# Patient Record
Sex: FEMALE | Race: WHITE | Employment: OTHER | URBAN - METROPOLITAN AREA
[De-identification: names, ages, dates, MRNs, and addresses within clinical notes are randomized per-mention and may not be internally consistent; named-entity substitution may affect disease eponyms.]

---

## 2017-09-10 ENCOUNTER — APPOINTMENT (OUTPATIENT)
Dept: GENERAL RADIOLOGY | Facility: CLINIC | Age: 82
End: 2017-09-10
Attending: EMERGENCY MEDICINE
Payer: COMMERCIAL

## 2017-09-10 ENCOUNTER — MEDICAL CORRESPONDENCE (OUTPATIENT)
Dept: HEALTH INFORMATION MANAGEMENT | Facility: CLINIC | Age: 82
End: 2017-09-10

## 2017-09-10 ENCOUNTER — HOSPITAL ENCOUNTER (OUTPATIENT)
Facility: CLINIC | Age: 82
Setting detail: OBSERVATION
Discharge: HOME | End: 2017-09-12
Attending: EMERGENCY MEDICINE | Admitting: INTERNAL MEDICINE
Payer: COMMERCIAL

## 2017-09-10 DIAGNOSIS — R79.89 ELEVATED LACTIC ACID LEVEL: ICD-10-CM

## 2017-09-10 DIAGNOSIS — E86.0 DEHYDRATION: ICD-10-CM

## 2017-09-10 DIAGNOSIS — R60.0 LOCALIZED EDEMA: Primary | ICD-10-CM

## 2017-09-10 DIAGNOSIS — R11.2 NON-INTRACTABLE VOMITING WITH NAUSEA, UNSPECIFIED VOMITING TYPE: ICD-10-CM

## 2017-09-10 DIAGNOSIS — R53.1 GENERALIZED WEAKNESS: ICD-10-CM

## 2017-09-10 LAB
ALBUMIN SERPL-MCNC: 3.5 G/DL (ref 3.4–5)
ALBUMIN UR-MCNC: 10 MG/DL
ALP SERPL-CCNC: 65 U/L (ref 40–150)
ALT SERPL W P-5'-P-CCNC: 19 U/L (ref 0–50)
ANION GAP SERPL CALCULATED.3IONS-SCNC: 8 MMOL/L (ref 3–14)
APPEARANCE UR: CLEAR
AST SERPL W P-5'-P-CCNC: 21 U/L (ref 0–45)
BASOPHILS # BLD AUTO: 0 10E9/L (ref 0–0.2)
BASOPHILS NFR BLD AUTO: 0.3 %
BILIRUB SERPL-MCNC: 0.9 MG/DL (ref 0.2–1.3)
BILIRUB UR QL STRIP: NEGATIVE
BUN SERPL-MCNC: 34 MG/DL (ref 7–30)
CALCIUM SERPL-MCNC: 9.1 MG/DL (ref 8.5–10.1)
CHLORIDE SERPL-SCNC: 103 MMOL/L (ref 94–109)
CO2 SERPL-SCNC: 30 MMOL/L (ref 20–32)
COLOR UR AUTO: YELLOW
CREAT SERPL-MCNC: 1.08 MG/DL (ref 0.52–1.04)
DIFFERENTIAL METHOD BLD: ABNORMAL
EOSINOPHIL # BLD AUTO: 0 10E9/L (ref 0–0.7)
EOSINOPHIL NFR BLD AUTO: 0 %
ERYTHROCYTE [DISTWIDTH] IN BLOOD BY AUTOMATED COUNT: 13.3 % (ref 10–15)
GFR SERPL CREATININE-BSD FRML MDRD: 48 ML/MIN/1.7M2
GLUCOSE SERPL-MCNC: 197 MG/DL (ref 70–99)
GLUCOSE UR STRIP-MCNC: NEGATIVE MG/DL
HCT VFR BLD AUTO: 39.2 % (ref 35–47)
HGB BLD-MCNC: 13.3 G/DL (ref 11.7–15.7)
HGB UR QL STRIP: NEGATIVE
HYALINE CASTS #/AREA URNS LPF: 1 /LPF (ref 0–2)
IMM GRANULOCYTES # BLD: 0 10E9/L (ref 0–0.4)
IMM GRANULOCYTES NFR BLD: 0 %
INR PPP: 2.49 (ref 0.86–1.14)
KETONES UR STRIP-MCNC: NEGATIVE MG/DL
LACTATE BLD-SCNC: 1.7 MMOL/L (ref 0.7–2)
LACTATE BLD-SCNC: 3.1 MMOL/L (ref 0.7–2)
LEUKOCYTE ESTERASE UR QL STRIP: NEGATIVE
LIPASE SERPL-CCNC: 230 U/L (ref 73–393)
LYMPHOCYTES # BLD AUTO: 0.1 10E9/L (ref 0.8–5.3)
LYMPHOCYTES NFR BLD AUTO: 3.8 %
MCH RBC QN AUTO: 40.8 PG (ref 26.5–33)
MCHC RBC AUTO-ENTMCNC: 33.9 G/DL (ref 31.5–36.5)
MCV RBC AUTO: 120 FL (ref 78–100)
MONOCYTES # BLD AUTO: 0.2 10E9/L (ref 0–1.3)
MONOCYTES NFR BLD AUTO: 4.9 %
MUCOUS THREADS #/AREA URNS LPF: PRESENT /LPF
NEUTROPHILS # BLD AUTO: 3.4 10E9/L (ref 1.6–8.3)
NEUTROPHILS NFR BLD AUTO: 91 %
NITRATE UR QL: NEGATIVE
NRBC # BLD AUTO: 0 10*3/UL
NRBC BLD AUTO-RTO: 0 /100
PH UR STRIP: 5.5 PH (ref 5–7)
PLATELET # BLD AUTO: 301 10E9/L (ref 150–450)
POTASSIUM SERPL-SCNC: 4.4 MMOL/L (ref 3.4–5.3)
PROT SERPL-MCNC: 7.9 G/DL (ref 6.8–8.8)
RBC # BLD AUTO: 3.26 10E12/L (ref 3.8–5.2)
RBC #/AREA URNS AUTO: 1 /HPF (ref 0–2)
SODIUM SERPL-SCNC: 141 MMOL/L (ref 133–144)
SOURCE: ABNORMAL
SP GR UR STRIP: 1.01 (ref 1–1.03)
UROBILINOGEN UR STRIP-MCNC: NORMAL MG/DL (ref 0–2)
WBC # BLD AUTO: 3.7 10E9/L (ref 4–11)
WBC #/AREA URNS AUTO: 1 /HPF (ref 0–2)

## 2017-09-10 PROCEDURE — 93010 ELECTROCARDIOGRAM REPORT: CPT | Performed by: INTERNAL MEDICINE

## 2017-09-10 PROCEDURE — 83605 ASSAY OF LACTIC ACID: CPT | Mod: 91 | Performed by: EMERGENCY MEDICINE

## 2017-09-10 PROCEDURE — 83605 ASSAY OF LACTIC ACID: CPT | Performed by: INTERNAL MEDICINE

## 2017-09-10 PROCEDURE — 99220 ZZC INITIAL OBSERVATION CARE,LEVL III: CPT | Performed by: INTERNAL MEDICINE

## 2017-09-10 PROCEDURE — 80053 COMPREHEN METABOLIC PANEL: CPT | Performed by: EMERGENCY MEDICINE

## 2017-09-10 PROCEDURE — 25000128 H RX IP 250 OP 636: Performed by: EMERGENCY MEDICINE

## 2017-09-10 PROCEDURE — 71020 XR CHEST 2 VW: CPT

## 2017-09-10 PROCEDURE — 81001 URINALYSIS AUTO W/SCOPE: CPT | Performed by: EMERGENCY MEDICINE

## 2017-09-10 PROCEDURE — G0378 HOSPITAL OBSERVATION PER HR: HCPCS

## 2017-09-10 PROCEDURE — 36415 COLL VENOUS BLD VENIPUNCTURE: CPT | Performed by: INTERNAL MEDICINE

## 2017-09-10 PROCEDURE — 25000132 ZZH RX MED GY IP 250 OP 250 PS 637: Performed by: INTERNAL MEDICINE

## 2017-09-10 PROCEDURE — 93005 ELECTROCARDIOGRAM TRACING: CPT

## 2017-09-10 PROCEDURE — 85025 COMPLETE CBC W/AUTO DIFF WBC: CPT | Performed by: EMERGENCY MEDICINE

## 2017-09-10 PROCEDURE — 25000128 H RX IP 250 OP 636: Performed by: INTERNAL MEDICINE

## 2017-09-10 PROCEDURE — 83690 ASSAY OF LIPASE: CPT | Performed by: EMERGENCY MEDICINE

## 2017-09-10 PROCEDURE — 99285 EMERGENCY DEPT VISIT HI MDM: CPT | Mod: 25

## 2017-09-10 PROCEDURE — 85610 PROTHROMBIN TIME: CPT | Performed by: EMERGENCY MEDICINE

## 2017-09-10 RX ORDER — LIDOCAINE 40 MG/G
CREAM TOPICAL
Status: DISCONTINUED | OUTPATIENT
Start: 2017-09-10 | End: 2017-09-12 | Stop reason: HOSPADM

## 2017-09-10 RX ORDER — NYSTATIN 100000 [USP'U]/G
POWDER TOPICAL 2 TIMES DAILY
COMMUNITY

## 2017-09-10 RX ORDER — NALOXONE HYDROCHLORIDE 0.4 MG/ML
.1-.4 INJECTION, SOLUTION INTRAMUSCULAR; INTRAVENOUS; SUBCUTANEOUS
Status: DISCONTINUED | OUTPATIENT
Start: 2017-09-10 | End: 2017-09-12 | Stop reason: HOSPADM

## 2017-09-10 RX ORDER — WARFARIN SODIUM 2 MG/1
2 TABLET ORAL
Status: COMPLETED | OUTPATIENT
Start: 2017-09-10 | End: 2017-09-10

## 2017-09-10 RX ORDER — PROCHLORPERAZINE MALEATE 5 MG
5 TABLET ORAL EVERY 6 HOURS PRN
Status: DISCONTINUED | OUTPATIENT
Start: 2017-09-10 | End: 2017-09-12 | Stop reason: HOSPADM

## 2017-09-10 RX ORDER — SODIUM CHLORIDE 9 MG/ML
INJECTION, SOLUTION INTRAVENOUS CONTINUOUS
Status: DISCONTINUED | OUTPATIENT
Start: 2017-09-10 | End: 2017-09-11

## 2017-09-10 RX ORDER — ACETAMINOPHEN 325 MG/1
650 TABLET ORAL EVERY 4 HOURS PRN
Status: DISCONTINUED | OUTPATIENT
Start: 2017-09-10 | End: 2017-09-12 | Stop reason: HOSPADM

## 2017-09-10 RX ORDER — PROCHLORPERAZINE 25 MG
12.5 SUPPOSITORY, RECTAL RECTAL EVERY 12 HOURS PRN
Status: DISCONTINUED | OUTPATIENT
Start: 2017-09-10 | End: 2017-09-12 | Stop reason: HOSPADM

## 2017-09-10 RX ORDER — SODIUM CHLORIDE 9 MG/ML
1000 INJECTION, SOLUTION INTRAVENOUS CONTINUOUS
Status: DISCONTINUED | OUTPATIENT
Start: 2017-09-10 | End: 2017-09-10

## 2017-09-10 RX ORDER — SIMVASTATIN 20 MG
20 TABLET ORAL AT BEDTIME
Status: DISCONTINUED | OUTPATIENT
Start: 2017-09-10 | End: 2017-09-12 | Stop reason: HOSPADM

## 2017-09-10 RX ORDER — METOPROLOL SUCCINATE 50 MG/1
50 TABLET, EXTENDED RELEASE ORAL 2 TIMES DAILY
Status: DISCONTINUED | OUTPATIENT
Start: 2017-09-10 | End: 2017-09-12 | Stop reason: HOSPADM

## 2017-09-10 RX ORDER — ONDANSETRON 4 MG/1
4 TABLET, ORALLY DISINTEGRATING ORAL EVERY 6 HOURS PRN
Status: DISCONTINUED | OUTPATIENT
Start: 2017-09-10 | End: 2017-09-12 | Stop reason: HOSPADM

## 2017-09-10 RX ORDER — ACETAMINOPHEN 650 MG/1
650 SUPPOSITORY RECTAL EVERY 4 HOURS PRN
Status: DISCONTINUED | OUTPATIENT
Start: 2017-09-10 | End: 2017-09-12 | Stop reason: HOSPADM

## 2017-09-10 RX ORDER — HYDROXYUREA 500 MG/1
1500 CAPSULE ORAL DAILY
Status: DISCONTINUED | OUTPATIENT
Start: 2017-09-11 | End: 2017-09-12 | Stop reason: HOSPADM

## 2017-09-10 RX ORDER — ONDANSETRON 2 MG/ML
4 INJECTION INTRAMUSCULAR; INTRAVENOUS EVERY 6 HOURS PRN
Status: DISCONTINUED | OUTPATIENT
Start: 2017-09-10 | End: 2017-09-12 | Stop reason: HOSPADM

## 2017-09-10 RX ADMIN — LEVOTHYROXINE SODIUM 37.5 MCG: 50 TABLET ORAL at 17:40

## 2017-09-10 RX ADMIN — SODIUM CHLORIDE 500 ML: 9 INJECTION, SOLUTION INTRAVENOUS at 15:44

## 2017-09-10 RX ADMIN — SODIUM CHLORIDE: 9 INJECTION, SOLUTION INTRAVENOUS at 17:40

## 2017-09-10 RX ADMIN — METOPROLOL SUCCINATE 50 MG: 50 TABLET, EXTENDED RELEASE ORAL at 21:35

## 2017-09-10 RX ADMIN — WARFARIN SODIUM 2 MG: 2 TABLET ORAL at 17:40

## 2017-09-10 RX ADMIN — SIMVASTATIN 20 MG: 20 TABLET, FILM COATED ORAL at 21:35

## 2017-09-10 ASSESSMENT — ENCOUNTER SYMPTOMS
CONFUSION: 1
ABDOMINAL PAIN: 1
FEVER: 1
VOMITING: 1

## 2017-09-10 NOTE — IP AVS SNAPSHOT
"    [place] [address] [facility]: [phone]                                              INTERAGENCY TRANSFER FORM - PHYSICIAN ORDERS   9/10/2017                    Hospital Admission Date: 9/10/2017  SIERRA CALABRESE   : 4/3/1928  Sex: Female        Attending Provider: Renée Greenberg MD     Allergies:  Ciprofloxacin, Vicodin [Hydrocodone-acetaminophen]    Infection:  None   Service:  HOSPITALIST    Ht:  1.626 m (5' 4\")   Wt:  89.2 kg (196 lb 10.4 oz)   Admission Wt:  89.5 kg (197 lb 5 oz)    BMI:  33.76 kg/m 2   BSA:  2.01 m 2            Patient PCP Information     Provider PCP Type    [facility]      ED Clinical Impression     Diagnosis Description Comment Added By Time Added    Generalized weakness [R53.1] Generalized weakness [R53.1]  Uma Henry 9/10/2017  4:01 PM    Elevated lactic acid level [R79.89] Elevated lactic acid level [R79.89]  Melo Burciaga DO 9/10/2017 11:01 PM    Non-intractable vomiting with nausea, unspecified vomiting type [R11.2] Non-intractable vomiting with nausea, unspecified vomiting type [R11.2]  Melo Burciaga DO 9/10/2017 11:01 PM      Hospital Problems as of 2017              Priority Class Noted POA    Dehydration Medium  9/10/2017 Yes      Non-Hospital Problems as of 2017              Priority Class Noted    DVT (deep venous thrombosis) (H) Medium  9/3/2013    Edema Medium  9/3/2013    Thrombocytosis (H) Medium  9/3/2013    Generalized weakness Medium  9/3/2013    Hypothyroidism Medium  9/3/2013    Memory loss Medium  9/3/2013    Constipation Medium  2013      Code Status History     Date Active Date Inactive Code Status Order ID Comments User Context    2017  9:14 AM  DNR/DNI [id number]  Fela Clark MD Outpatient    9/10/2017  7:40 PM 2017  9:14 AM DNR/DNI [id number]  Renée Greenberg MD Inpatient    2013  1:34 PM 9/10/2017  7:40 PM Full Code [id number]  Gladis Leblanc " Outpatient         Medication Review      CONTINUE these medications which may have CHANGED, or have new prescriptions. If we are uncertain of the size of tablets/capsules you have at home, strength may be listed as something that might have changed.        Dose / Directions Comments    COUMADIN 2 MG tablet   This may have changed:    - medication strength  - additional instructions   Generic drug:  warfarin        Dose:  2 mg   Take 1 tablet (2 mg) by mouth daily Hold coumadin tonight and recheck INR tomorrow and restart   Quantity:  30 tablet   Refills:  0        furosemide 20 MG tablet   Commonly known as:  LASIX   This may have changed:    - medication strength  - how much to take   Used for:  Localized edema        Dose:  20 mg   Take 1 tablet (20 mg) by mouth daily   Quantity:  30 tablet   Refills:  0          CONTINUE these medications which have NOT CHANGED        Dose / Directions Comments    CALTRATE 600+D PO        Dose:  1 tablet   Take 1 tablet by mouth daily   Refills:  0        CHOLECALCIFEROL        Dose:  1000 Units   Take 1,000 Units by mouth daily   Refills:  0        cyanocobalamin 500 MCG Tabs        Dose:  500 mcg   Take 500 mcg by mouth daily   Refills:  0        HYDREA 500 MG capsule CHEMO   Generic drug:  hydroxyurea        Dose:  1500 mg   Take 1,500 mg by mouth daily   Refills:  0        nystatin 808918 UNIT/GM Powd   Commonly known as:  MYCOSTATIN        Apply topically 2 times daily as needed   Refills:  0        SIMVASTATIN PO        Dose:  20 mg   Take 20 mg by mouth At Bedtime   Refills:  0        SYNTHROID PO        Dose:  37.5 mcg   Take 37.5 mcg by mouth daily   Refills:  0        TOPROL XL 50 MG 24 hr tablet   Generic drug:  metoprolol        Dose:  50 mg   Take 50 mg by mouth 2 times daily   Refills:  0          STOP taking     POTASSIUM CHLORIDE                   Follow-Up Appointment Instructions     Future Labs/Procedures    Follow-up and recommended labs and tests       Comments:    Follow up with primary care provider, [facility], within 7 days for hospital follow- up.  The following labs/tests are recommended: recheck BMP and INR in 1week .      Follow-Up Appointment Instructions     Follow-up and recommended labs and tests        Follow up with primary care provider, [facility], within 7 days for hospital follow- up.  The following labs/tests are recommended: recheck BMP and INR in 1week .             Statement of Approval     Ordered          09/12/17 0915  I have reviewed and agree with all the recommendations and orders detailed in this document.  EFFECTIVE NOW     Approved and electronically signed by:  Fela Clark MD

## 2017-09-10 NOTE — ED NOTES
"[facility]  ED Nurse Handoff Report    ED Chief complaint: Altered Mental Status (low o2 sats 88-92%, increasing confusion, vomiting, low grade temps)      ED Diagnosis:   Final diagnoses:   None       Code Status: See H&P    Allergies:   Allergies   Allergen Reactions     Ciprofloxacin Hives     Vicodin [Hydrocodone-Acetaminophen] Rash       Activity level - Baseline/Home:  Ambulates with walker but spends most of her time in a recliner per dtr    Activity Level - Current:   Total Care     Needed?: No    Isolation: No  Infection: Not Applicable    Bariatric?: No    Vital Signs:   Vitals:    09/10/17 1415 09/10/17 1430 09/10/17 1547 09/10/17 1548   BP: 111/82  136/73    Pulse:       Resp:       Temp:       TempSrc:       SpO2: 97% 93% 96% 95%       Cardiac Rhythm: ,        Pain level:      Is this patient confused?: Yes, increased from baseline    Patient Report: Initial Complaint: patient sent from [facility] with increased confusion, recent vomiting and low grade temps. Patient's spouse had also been sick last week with \"stomach flu\"  Focused Assessment: AOx1, baseline dementia but more confused than usual per daughter. AVSS. Told by ems patient had RA sats 88-90% but 94% in ED on RA. LS diminished. Back pain, kyphosis. Fragile skin with bruising, 4 attempts to start IV due to veins blowing when flushed. Continue to monitor IV site.   Tests Performed: labs, chest xray, urine  Abnormal Results: lactic acid 3.1  Treatments provided: 500cc bolus    Family Comments: daughter at bedside    OBS brochure/video discussed/provided to patient: N/A , not appropriate for patient    ED Medications:   Medications   0.9% sodium chloride BOLUS (500 mLs Intravenous New Bag 9/10/17 1544)     Followed by   0.9% sodium chloride infusion (not administered)       Drips infusing?:  No      ED NURSE PHONE NUMBER: *[phone]         "

## 2017-09-10 NOTE — PHARMACY-ADMISSION MEDICATION HISTORY
Admission medication history interview status for the 9/10/2017  admission is complete. See EPIC admission navigator for prior to admission medications     Medication history source reliability:Good    Actions taken by pharmacist (provider contacted, etc):None     Additional medication history information not noted on PTA med list :    1. Patient is from nursing home. She has a paper list of medications with her from home, which was reviewed with patient's daughter due to current mental status. Nursing home list included some old medications that she is no longer getting per daughter, stating that patient's family helps administer medications and not nursing staff.   2. Medications added: Simvastatin and Nystatin  3. Medications deleted: Maalox prn, Dulcolax 10mg suppository prn, Metolazone 2.5mg once a week, Miralax prn, Metamucil prn, Senna-S two tablets BID.     Medication reconciliation/reorder completed by provider prior to medication history? No    Time spent in this activity: 20 minutes    Prior to Admission medications    Medication Sig Last Dose Taking? Auth Provider   FUROSEMIDE PO Take 40 mg by mouth daily 9/9/2017 at am Yes Unknown, Entered By History   SIMVASTATIN PO Take 20 mg by mouth At Bedtime 9/9/2017 at pm Yes Unknown, Entered By History   nystatin (MYCOSTATIN) 979663 UNIT/GM POWD Apply topically 2 times daily as needed Past Week at prn Yes Unknown, Entered By History   metoprolol (TOPROL XL) 50 MG 24 hr tablet Take 50 mg by mouth 2 times daily 9/9/2017 at pm Yes Reported, Patient   Calcium Carbonate-Vitamin D (CALTRATE 600+D PO) Take 1 tablet by mouth daily  9/9/2017 at am Yes Reported, Patient   CHOLECALCIFEROL Take 1,000 Units by mouth daily 9/9/2017 at am Yes Reported, Patient   cyanocobalamin 500 MCG TABS Take 500 mcg by mouth daily  9/9/2017 at am Yes Reported, Patient   hydroxyurea (HYDREA) 500 MG capsule Take 1,500 mg by mouth daily 9/9/2017 at am Yes Reported, Patient   POTASSIUM CHLORIDE  Take 20 mEq by mouth daily 9/9/2017 at am Yes Reported, Patient   Warfarin Sodium (COUMADIN PO) Take 2 mg by mouth daily  9/9/2017 at 1700 Yes Reported, Patient   Levothyroxine Sodium (SYNTHROID PO) Take 37.5 mcg by mouth daily  9/9/2017 at am Yes Reported, Patient

## 2017-09-10 NOTE — PHARMACY-ANTICOAGULATION SERVICE
Clinical Pharmacy - Warfarin Dosing Consult     Pharmacy has been consulted to manage this patient s warfarin therapy.  Indication: Atrial Fibrillation  Therapy Goal: INR 2-3  Warfarin Prior to Admission: Yes  Warfarin PTA Regimen: 2MG DAILY    INR   Date Value Ref Range Status   09/10/2017 2.49 (H) 0.86 - 1.14 Final   08/29/2013 2.3  Final       Recommend warfarin 2 mg today.  Pharmacy will monitor Antoinette Cowan daily and order warfarin doses to achieve specified goal.      Please contact pharmacy as soon as possible if the warfarin needs to be held for a procedure or if the warfarin goals change.

## 2017-09-10 NOTE — IP AVS SNAPSHOT
[place] [address] [facility]    [address] [place] [address]    Phone:  [phone]                                       After Visit Summary   9/10/2017    Antoinette Cowan    MRN: [id number]           After Visit Summary Signature Page     I have received my discharge instructions, and my questions have been answered. I have discussed any challenges I see with this plan with the nurse or doctor.    ..........................................................................................................................................  Patient/Patient Representative Signature      ..........................................................................................................................................  Patient Representative Print Name and Relationship to Patient    ..................................................               ................................................  Date                                            Time    ..........................................................................................................................................  Reviewed by Signature/Title    ...................................................              ..............................................  Date                                                            Time

## 2017-09-10 NOTE — H&P
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 Evaluation in the emergency department was only significant for an elevated lactic acid without any other SIRS criteria or localizing findings. Request was made for observation and further administration of IV fluids. Patient accepted to hospitalist service.    1. Dehydration, weakness after suspected viral gastroenteritis. Lactic acid 3.2 in emergency department. Will continue hydrating with IV fluids and repeat lactate in 4 hours. PRN antiemetics have been ordered. PT/OT also consulted given weakness to ensure patient has safe discharge plan after administration of IV fluids overnight. Will hold lasix at this time.    2. 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 Continue warfarin and metoprolol. INR is therapeutic.    3. History of thrombocytosis. Continue hydroxyurea.     4. Hypothyroidism. Continue Synthroid.    5. Venous stasis changes and chronic lower extremity edema. Holding patient's home lasix and potassium supplementation while hydrating with IV fluids.    6. Dementia.    DVT Prophylaxis: Patient already anticoagulated with warfarin.  Code Status: DNR / DNI    Disposition: Expected discharge in < 2 midnights after observation period and treatment with IV fluids.     Renée Frederick MD    Primary Care Physician   [facility]    Chief Complaint   Weakness, confusion, emesis    History is obtained from the patient's daughter.    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     Patient brought to emergency department by daughter from assisted living facility for increased weakness on confusion. Patient is not oriented at baseline with her dementia, but is normally conversational and alert. Daughter reports that patient's  had a GI illness in past week and patient was up on night of 9/9 with emesis and additional emesis this morning. Reported temperature of 100 F on morning of admission. Patient with increased weakness and inability to steer walker, with daughter reporting she was running into walls. Increased fatigue. Daughter also reports that patient was not making eye contact earlier in the day either, though when patient seen after initial evaluation and treatment, she is reportedly improved slightly as she is now slightly more conversant and making eye contact.   Evaluation in the emergency department included CBC, BMP, lipase, lactic acid, urinalysis, and chest x-ray. The only significant finding was an elevated lactic acid of 3.2. No focal findings on history or exam. No abdominal pain. Patient is a poor historian, but denies any localizing pain. Denies any chest pain, shortness of breath, or headaches. Chronic back pain unchanged. Chronic venous stasis changes without any lower extremity pain. Patient has significant kyphosis and previous back surgeries which make transfers uncomfortable, but daughter states this is unchanged.    Patient was treated with IV fluids and request was made for observation and further administration of IV fluids. Patient accepted to hospitalist service.    Past Medical History    I have reviewed this patient's medical history  and updated it with pertinent information if needed.   Past Medical History:   Diagnosis Date     Anemia      Atrial fibrillation, new onset (H) 8/25/13     Breast cancer (H) 1997     Chronic pain      DVT (deep venous thrombosis) (H)      Hyperlipaemia      Hypertension      Hypothyroidism      Mixed hyperlipidemia      Thrombocytosis (H)        Past Surgical History   I have reviewed this patient's surgical history and updated it with pertinent information if needed.  Past Surgical History:   Procedure Laterality Date     JOINT REPLACEMENT  7/23/2009    left total knee replacement     KYPHOPLASTY       Igor Nitinol Filter         Prior to Admission Medications   Prior to Admission Medications   Prescriptions Last Dose Informant Patient Reported? Taking?   CHOLECALCIFEROL 9/9/2017 at am Daughter Yes Yes   Sig: Take 1,000 Units by mouth daily   Calcium Carbonate-Vitamin D (CALTRATE 600+D PO) 9/9/2017 at am Daughter Yes Yes   Sig: Take 1 tablet by mouth daily    FUROSEMIDE PO 9/9/2017 at am Daughter Yes Yes   Sig: Take 40 mg by mouth daily   Levothyroxine Sodium (SYNTHROID PO) 9/9/2017 at am Daughter Yes Yes   Sig: Take 37.5 mcg by mouth daily    POTASSIUM CHLORIDE 9/9/2017 at am Daughter Yes Yes   Sig: Take 20 mEq by mouth daily   SIMVASTATIN PO 9/9/2017 at pm Daughter Yes Yes   Sig: Take 20 mg by mouth At Bedtime   Warfarin Sodium (COUMADIN PO) 9/9/2017 at 1700 Daughter Yes Yes   Sig: Take 2 mg by mouth daily    cyanocobalamin 500 MCG TABS 9/9/2017 at am Daughter Yes Yes   Sig: Take 500 mcg by mouth daily    hydroxyurea (HYDREA) 500 MG capsule 9/9/2017 at am Daughter Yes Yes   Sig: Take 1,500 mg by mouth daily   metoprolol (TOPROL XL) 50 MG 24 hr tablet 9/9/2017 at pm Daughter Yes Yes   Sig: Take 50 mg by mouth 2 times daily   nystatin (MYCOSTATIN) [id number] UNIT/GM POWD Past Week at prn Daughter Yes Yes   Sig: Apply topically 2 times daily as needed      Facility-Administered Medications: None     Allergies    Allergies   Allergen Reactions     Ciprofloxacin Hives     Vicodin [Hydrocodone-Acetaminophen] Rash       Social History   I have reviewed this patient's social history and updated it with pertinent information if needed. Antoinette Cowan      Family History   I have reviewed this patient's family history and updated it with pertinent information if needed.   No family history on file.    Review of Systems   Remainder of the 10 point Review of Systems is negative other than noted in the HPI.    Physical Exam   Temp: 98.3  F (36.8  C) Temp src: Axillary BP: 136/73 Pulse: 98   Resp: 18 SpO2: 94 % O2 Device: None (Room air)    Vital Signs with Ranges  Temp:  [98.3  F (36.8  C)] 98.3  F (36.8  C)  Pulse:  [98] 98  Resp:  [18] 18  BP: (111-136)/(71-82) 136/73  SpO2:  [93 %-99 %] 94 %  0 lbs 0 oz    Constitutional: Obese elderly woman sitting up with severe kyphosis. Alert. Not oriented at baseline. Not oriented at time of exam. Looking about room, making eye contact. Able to answer questions, though poor historian.  HEENT: Normocephalic, atraumatic. PERRL, EOMI. Oral mucosa is dry. No scleral icterus.   Respiratory: Clear to auscultation bilaterally without crackles or wheezes.  Cardiovascular: Irregularly irregular rhythm, regular rate. 2/6 ALE noted.   GI: Soft, nontender on exam, nondistended. Normoactive bowel sounds.  Extremities: Chronic venous changes bilaterally with 2+ pitting edema bilaterally. No gross deformities.   Neurologic: Patient is alert and interactive, though daughter notes this is not at her baseline yet. Did not assess gait as it was reported to be unsteady.    Data   Data reviewed today:  I personally reviewed patient's EKG and chest x-ray. EKG shows patient is currently in atrial fibrillation with controlled rate. No infiltrates on chest x-ray.    Recent Labs  Lab 09/10/17  1450   WBC 3.7*   HGB 13.3   *      INR 2.49*      POTASSIUM 4.4   CHLORIDE 103   CO2 30   BUN 34*   CR  1.08*   ANIONGAP 8   KACEY 9.1   *   ALBUMIN 3.5   PROTTOTAL 7.9   BILITOTAL 0.9   ALKPHOS 65   ALT 19   AST 21   LIPASE 230       Imaging:  Recent Results (from the past 24 hour(s))   Chest XR,  PA & LAT    Narrative    CHEST TWO VIEW   9/10/2017 3:24 PM     HISTORY: Confusion, edema.    COMPARISON: None.    FINDINGS: Kyphosis. Vertebroplasty cement in lower thoracic and upper  lumbar vertebrae. Hiatal hernia. Heart and lungs negative.      Impression    IMPRESSION: No acute abnormality. Hiatal hernia. Cardiomegaly. No  infiltrates.    EVA TAPIA MD

## 2017-09-10 NOTE — IP AVS SNAPSHOT
MRN:[id number]                      After Visit Summary   9/10/2017    Antoinette Cowan    MRN: [id number]           Thank you!     Thank you for choosing [facility] for your care. Our goal is always to provide you with excellent care. Hearing back from our patients is one way we can continue to improve our services. Please take a few minutes to complete the written survey that you may receive in the mail after you visit with us. Thank you!        Patient Information     Date Of Birth          4/3/1928        Designated Caregiver       Most Recent Value    Caregiver    Will someone help with your care after discharge? yes    Name of designated caregiver Nicol Cowan    Phone number of caregiver [phone]    Caregiver address [address] [address]      About your hospital stay     You were admitted on:  September 10, 2017 You last received care in the:  [facility] [address] [facility]    You were discharged on:  September 12, 2017       Who to Call     For medical emergencies, please call 911.  For non-urgent questions about your medical care, please call your primary care provider or clinic, [phone]          Attending Provider     Provider Melo Daniels DO Emergency Medicine    Renée Greenberg MD Internal Medicine       Primary Care Provider Office Phone # Fax #    [facility] [phone]      After Care Instructions     Activity       Your activity upon discharge: activity as tolerated            Diet       Follow this diet upon discharge:regular                  Follow-up Appointments     Follow-up and recommended labs and tests        Follow up with primary care provider, Linda Park, within 7 days for hospital follow- up.  The following labs/tests are recommended: recheck BMP and INR in 1week .                  Additional Services     Home Care PT Referral for Hospital Discharge       PT to celiaal and  treat    Your provider has ordered home care - physical therapy. If you have not been contacted within 2 days of your discharge please call the department phone number listed on the top of this document.            Home care nursing referral       RN skilled nursing visit. RN to teach medication management.    Your provider has ordered home care nursing services. If you have not been contacted within 2 days of your discharge please call the inpatient department phone number at [phone] .                  Warfarin Instruction     You have started taking a medicine called warfarin. This is a blood-thinning medicine (anticoagulant). It helps prevent and treat blood clots.      Before leaving the hospital, make sure you know how much to take and how long to take it.      You will need regular blood tests to make sure your blood is clotting safely. It is very important to see your doctor for regular blood tests.    Talk to your doctor before taking any new medicine (this includes over-the-counter drugs and herbal products). Many medicines can interact with warfarin. This may cause more bleeding or too much clotting.     Eating a lot of vitamin K--found in green, leafy vegetables--can change the way warfarin works in your body. Do NOT avoid these foods. Instead, try to eat the same amount each day.     Bleeding is the most common side-effect of warfarin. You may notice bleeding gums, a bloody nose, bruises and bleeding longer when you cut yourself. See a doctor at once if:   o You cough up blood  o You find blood in your stool (poop)  o You have a deep cut, or a cut that bleeds longer than 10 minutes   o You have a bad cut, hard fall, accident or hit your head (go to urgent care or the emergency room).    For women who can get pregnant: This medicine can harm an unborn baby. Be very careful not to get pregnant while taking this medicine. If you think you might be pregnant, call your doctor right away.    For more  "information, read \"Guide to Warfarin Therapy,  the booklet you received in the hospital.        Pending Results     Date and Time Order Name Status Description    9/10/2017 1645 EKG 12-lead, tracing only Preliminary             Statement of Approval     Ordered          17 0915  I have reviewed and agree with all the recommendations and orders detailed in this document.  EFFECTIVE NOW     Approved and electronically signed by:  Fela Clark MD             Admission Information     Date & Time Provider Department Dept. Phone    9/10/2017 Renée Greenberg MD [facility] [address] [facility] [phone]      Your Vitals Were     Blood Pressure Pulse Temperature Respirations Height Weight    105/56 (BP Location: Left arm) 65 98.2  F (36.8  C) (Oral) 18 1.626 m (5' 4\") 89.2 kg (196 lb 10.4 oz)    Pulse Oximetry BMI (Body Mass Index)                97% 33.76 kg/m2          [facility] Information     [facility] lets you send messages to your doctor, view your test results, renew your prescriptions, schedule appointments and more. To sign up, go to [email or web address] . Click on \"Log in\" on the left side of the screen, which will take you to the Welcome page. Then click on \"Sign up Now\" on the right side of the page.     You will be asked to enter the access code listed below, as well as some personal information. Please follow the directions to create your username and password.     Your access code is: [email or web address]  Expires: 2017  9:17 AM     Your access code will  in 90 days. If you need help or a new code, please call your [facility] clinic or [phone].        Care EveryWhere ID     This is your Care EveryWhere ID. This could be used by other organizations to access your [facility] medical records  [id number]        Equal Access to Services     [facility] ROS AH: Lakhwinder Esqueda, waaxda luqadaha, qaybta kaalmadaylin partida, madhuri sharma. So [facility] " [phone].    ATENCIÓN: Si carmelita giron, tiene a nogueira disposición servicios gratuitos de asistencia lingüística. Dez stone [phone].    We comply with applicable federal civil rights laws and [place] laws. We do not discriminate on the basis of race, color, national origin, age, disability sex, sexual orientation or gender identity.               Review of your medicines      CONTINUE these medicines which may have CHANGED, or have new prescriptions. If we are uncertain of the size of tablets/capsules you have at home, strength may be listed as something that might have changed.        Dose / Directions    COUMADIN 2 MG tablet   This may have changed:    - medication strength  - additional instructions   Generic drug:  warfarin        Dose:  2 mg   Take 1 tablet (2 mg) by mouth daily Hold coumadin tonight and recheck INR tomorrow and restart   Quantity:  30 tablet   Refills:  0       furosemide 20 MG tablet   Commonly known as:  LASIX   This may have changed:    - medication strength  - how much to take   Used for:  Localized edema        Dose:  20 mg   Take 1 tablet (20 mg) by mouth daily   Quantity:  30 tablet   Refills:  0         CONTINUE these medicines which have NOT CHANGED        Dose / Directions    CALTRATE 600+D PO        Dose:  1 tablet   Take 1 tablet by mouth daily   Refills:  0       CHOLECALCIFEROL        Dose:  1000 Units   Take 1,000 Units by mouth daily   Refills:  0       cyanocobalamin 500 MCG Tabs        Dose:  500 mcg   Take 500 mcg by mouth daily   Refills:  0       HYDREA 500 MG capsule CHEMO   Generic drug:  hydroxyurea        Dose:  1500 mg   Take 1,500 mg by mouth daily   Refills:  0       nystatin [id number] UNIT/GM Powd   Commonly known as:  MYCOSTATIN        Apply topically 2 times daily as needed   Refills:  0       SIMVASTATIN PO        Dose:  20 mg   Take 20 mg by mouth At Bedtime   Refills:  0       SYNTHROID PO        Dose:  37.5 mcg   Take 37.5 mcg by mouth daily   Refills:  0        TOPROL XL 50 MG 24 hr tablet   Generic drug:  metoprolol        Dose:  50 mg   Take 50 mg by mouth 2 times daily   Refills:  0         STOP taking     POTASSIUM CHLORIDE                Where to get your medicines      These medications were sent to [facility] [place], [place] - [address]  [address], [place] [address]     Phone:  [phone]     furosemide 20 MG tablet                Protect others around you: Learn how to safely use, store and throw away your medicines at www.disposemymeds.org.             Medication List: This is a list of all your medications and when to take them. Check marks below indicate your daily home schedule. Keep this list as a reference.      Medications           Morning Afternoon Evening Bedtime As Needed    CALTRATE 600+D PO   Take 1 tablet by mouth daily                                CHOLECALCIFEROL   Take 1,000 Units by mouth daily                                COUMADIN 2 MG tablet   Take 1 tablet (2 mg) by mouth daily Hold coumadin tonight and recheck INR tomorrow and restart   Last time this was given:  1 mg on 9/11/2017  6:59 PM   Generic drug:  warfarin                                cyanocobalamin 500 MCG Tabs   Take 500 mcg by mouth daily                                furosemide 20 MG tablet   Commonly known as:  LASIX   Take 1 tablet (20 mg) by mouth daily                                HYDREA 500 MG capsule CHEMO   Take 1,500 mg by mouth daily   Last time this was given:  1,500 mg on 9/12/2017  9:32 AM   Generic drug:  hydroxyurea                                nystatin [id number] UNIT/GM Powd   Commonly known as:  MYCOSTATIN   Apply topically 2 times daily as needed                                SIMVASTATIN PO   Take 20 mg by mouth At Bedtime   Last time this was given:  20 mg on 9/11/2017  9:47 PM                                SYNTHROID PO   Take 37.5 mcg by mouth daily   Last time this was given:  37.5 mcg on 9/12/2017  9:33 AM                                 TOPROL XL 50 MG 24 hr tablet   Take 50 mg by mouth 2 times daily   Last time this was given:  50 mg on 9/12/2017  9:33 AM   Generic drug:  metoprolol

## 2017-09-10 NOTE — ED NOTES
Bed: ED04  Expected date:   Expected time:   Means of arrival:   Comments:  [place] - low O2 sats. Eta 1410

## 2017-09-10 NOTE — ED PROVIDER NOTES
History     Chief Complaint:  Altered Mental Status    HPI   Antoinette Cowan is a 89 year old female, who is currently on Coumadin and has a history of dementia amongst other complicated past medication history as noted below, who presents with her daughter via EMS to the emergency department for evaluation of altered mental status. The history was primarily obtained from the daughter as the patient was confused. The patient currently lives in an assisted living facility with her spouse and was reported to have increased confusion that began late last night or maybe some time this morning. The daughter reports that the patient was baseline yesterday around 1800 when she left after a visit, but was informed by staff at the facility that she had a low-grade fever of 100F at 1100 today and the spouse reported she had began to throw up in the middle of the night. When the daughter went to check on the patient, she noted that patient had increased confusion. She was unable to stand on her own, in addition to had difficulty recognizing people, which is abnormal for her. The daughter also noted that she was not very interactive and responsive with other people which is different than baseline for him. The daughter also noted that the patient was unable to steer her walker and would run into the wall. The patient reports to experiencing mild abdominal pain, but denies any fall. To note, the patient's spouse fell ill a few days ago with flulike symptoms and the patient experienced some diarrhea 3 days ago, but that has resolved. Furthermore, the patient has not had any of her medications today.     Allergies:  Ciprofloxacin  Vicodin     Medications:    Zaroxolyn  Maalox  Ducloax  Miralax  Senna  Lasix  Metoprolol  Caltrate  Cholecaliferol  Cyanocabalamin  Hydrea  Potassium chloride  Metamucil  Coumadin  Synthroid    Past Medical History:    Anemia  Atrial fibrillation  Breast cancer  Chronic  "pain  DVT  Dementia  Hyperlipidemia  Hypertension  Hypothyroidism  Thrombocytosis    Past Surgical History:    Left total knee replacement  Kyphoplasty  Igor nitinol filter    Family History:    The patient denies any relevant family medical history.     Social History:  The patient was accompanied to the ED by EMS and daughter.  Smoking Status: N/A  Smokeless Tobacco: N/A  Alcohol Use: N/A   Marital Status:   [2]     Review of Systems   Constitutional: Positive for fever.   Gastrointestinal: Positive for abdominal pain and vomiting.   Psychiatric/Behavioral: Positive for confusion.   All other systems reviewed and are negative.    Physical Exam   Vitals:  Patient Vitals for the past 24 hrs:   BP Temp Temp src Pulse Heart Rate Resp SpO2 Height Weight   09/10/17 2135 130/62 - - 98 - - - - -   09/10/17 1656 132/55 98.3  F (36.8  C) Oral - 81 18 96 % 1.626 m (5' 4\") 89.5 kg (197 lb 5 oz)   09/10/17 1608 - - - - - - 94 % 1.676 m (5' 6\") -   09/10/17 1548 - - - - - - 95 % - -   09/10/17 1547 136/73 - - - - - 96 % - -   09/10/17 1430 - - - - - - 93 % - -   09/10/17 1415 111/82 - - - - - 97 % - -   09/10/17 1414 127/71 98.3  F (36.8  C) Axillary 98 - 18 99 % - -      Physical Exam  General: Alert and cooperative with exam. Patient in mild distress. Confused changed from baseline mentation.   Head:  Scalp is NC/AT  Eyes:  No scleral icterus, PERRL, EOMI   ENT:  The external nose and ears are normal. The oropharynx is normal and without erythema; mucus membranes are somewhat dry. Uvula midline, no evidence of deep space infection.  Neck:  Normal range of motion without rigidity.  CV:  Regular rate and rhythm    No pathologic murmur   Resp:  Breath sounds are clear bilaterally    Non-labored, no retractions or accessory muscle use  GI:  Abdomen is soft, no distension, no tenderness. No peritoneal signs  MS:  No lower extremity edema   Skin:  Warm and dry, No rash or lesions noted.  Neuro: Alert. History of dementia, " increased confusion per family No gross motor deficits.    Strength and sensation grossly intact in all 4 extremities.      Cranial nerves 2-12 intact.    GCS: 15  Emergency Department Course     Imaging:  Radiology findings were communicated with the patient and family who voiced understanding of the findings.  Chest XR, PA & LAT:   IMPRESSION: No acute abnormality. Hiatal hernia. Cardiomegaly. No  infiltrates.  Reading per radiology.     Laboratory:  Laboratory findings were communicated with the patient and family who voiced understanding of the findings.  CBC: WBC 3.7 (L) o/w WNL (HGB 13.3, )  CMP: Glucose 197 (H), Bun 34 (H), GFR Estimate 48 (L), Creatinine 1.08 (H) o/w WNL  Lactic Acid Whole Blood: 3.1 (H)  INR: 2.49 (H)  Lipase: 230  UA Reflex to Microscopic: Protein Albumin 10 (A), Mucous Urine Present (A) o/w WNL    Interventions:  1544 0.9% NaCl Bolus 500 mL IV     Emergency Department Course:  Nursing notes and vitals reviewed.  I performed an exam of the patient as documented above.   IV was inserted and blood was drawn for laboratory testing, results above.  The patient was sent for a Chest XR, PA & LAT while in the emergency department, results above.   The patient provided a urine sample here in the emergency department. This was sent for laboratory testing, findings above.      3:37 PM: I rechecked the patient and discussed laboratory results with her and daughter.     I discussed the treatment plan with the patient. They expressed understanding of this plan and consented to admission. I discussed the patient with Dr. Greenberg, who will admit the patient to a monitored bed for further evaluation and treatment.     I personally reviewed the laboratory results with the Patient and daughter and answered all related questions prior to admission.    Impression & Plan      Medical Decision Making:  This patient is an 89 year old female with a history of dementia who presents from assisted care with  report of borderline fever, generalized weakness, emesis and increased confusion from baseline as well as report of possible hypoxia. Patient's medical history and records were reviewed. Initial consider for, but not limited to, infectious process, dehydration, medication side effect, among others. Labs and imaging were obtained. Patient's had no focal neurologic deficits on exam and no history of trauma; no indication for advance head imaging at this time. Labs obtained and demonstrated elevated lactic acid (3.1) and therapeutic INR (2.49). Patient is afebrile and not requiring oxygen on arrival. Remained hemodynamically stable throughout ED encounter. UA without evidenced of infection; chest x-ray is negative. Patient does have  who has also been sick with GI symptoms and at this time presentation is most consistent with viral illness. No indication for antibiotics at this time. She was provided IV fluids and admitted to [facility] with the hospitalist service for further observation and care.     Diagnosis:    ICD-10-CM    1. Generalized weakness R53.1 Lactic acid whole blood   2. Elevated lactic acid level R79.89    3. Non-intractable vomiting with nausea, unspecified vomiting type R11.2         Disposition:   Admitted for observation with the hospitalist service    Scribe Disclosure:  IRita, am serving as a scribe at 2:19 PM on 9/10/2017 to document services personally performed by Melo Burciaga DO, based on my observations and the provider's statements to me.  9/10/2017    EMERGENCY DEPARTMENT       Melo Burciaga DO  09/10/17 1735

## 2017-09-11 ENCOUNTER — APPOINTMENT (OUTPATIENT)
Dept: PHYSICAL THERAPY | Facility: CLINIC | Age: 82
End: 2017-09-11
Payer: COMMERCIAL

## 2017-09-11 LAB
ANION GAP SERPL CALCULATED.3IONS-SCNC: 6 MMOL/L (ref 3–14)
BASOPHILS # BLD AUTO: 0 10E9/L (ref 0–0.2)
BASOPHILS NFR BLD AUTO: 0.2 %
BUN SERPL-MCNC: 32 MG/DL (ref 7–30)
CALCIUM SERPL-MCNC: 8.8 MG/DL (ref 8.5–10.1)
CHLORIDE SERPL-SCNC: 109 MMOL/L (ref 94–109)
CO2 SERPL-SCNC: 27 MMOL/L (ref 20–32)
CREAT SERPL-MCNC: 1.06 MG/DL (ref 0.52–1.04)
DIFFERENTIAL METHOD BLD: ABNORMAL
EOSINOPHIL # BLD AUTO: 0 10E9/L (ref 0–0.7)
EOSINOPHIL NFR BLD AUTO: 0.4 %
ERYTHROCYTE [DISTWIDTH] IN BLOOD BY AUTOMATED COUNT: 13.9 % (ref 10–15)
FLUAV+FLUBV RNA SPEC QL NAA+PROBE: NEGATIVE
FLUAV+FLUBV RNA SPEC QL NAA+PROBE: NEGATIVE
GFR SERPL CREATININE-BSD FRML MDRD: 49 ML/MIN/1.7M2
GLUCOSE SERPL-MCNC: 108 MG/DL (ref 70–99)
HCT VFR BLD AUTO: 35.3 % (ref 35–47)
HGB BLD-MCNC: 11.6 G/DL (ref 11.7–15.7)
IMM GRANULOCYTES # BLD: 0 10E9/L (ref 0–0.4)
IMM GRANULOCYTES NFR BLD: 0.2 %
INR PPP: 3.01 (ref 0.86–1.14)
LACTATE BLD-SCNC: 1.1 MMOL/L (ref 0.7–2)
LYMPHOCYTES # BLD AUTO: 0.5 10E9/L (ref 0.8–5.3)
LYMPHOCYTES NFR BLD AUTO: 9.6 %
MCH RBC QN AUTO: 39.9 PG (ref 26.5–33)
MCHC RBC AUTO-ENTMCNC: 32.9 G/DL (ref 31.5–36.5)
MCV RBC AUTO: 121 FL (ref 78–100)
MONOCYTES # BLD AUTO: 0.4 10E9/L (ref 0–1.3)
MONOCYTES NFR BLD AUTO: 7.7 %
NEUTROPHILS # BLD AUTO: 4.6 10E9/L (ref 1.6–8.3)
NEUTROPHILS NFR BLD AUTO: 81.9 %
NRBC # BLD AUTO: 0 10*3/UL
NRBC BLD AUTO-RTO: 0 /100
PLATELET # BLD AUTO: 291 10E9/L (ref 150–450)
POTASSIUM SERPL-SCNC: 3.7 MMOL/L (ref 3.4–5.3)
RBC # BLD AUTO: 2.91 10E12/L (ref 3.8–5.2)
RSV RNA SPEC NAA+PROBE: NEGATIVE
SODIUM SERPL-SCNC: 142 MMOL/L (ref 133–144)
SPECIMEN SOURCE: NORMAL
WBC # BLD AUTO: 5.6 10E9/L (ref 4–11)

## 2017-09-11 PROCEDURE — 25000132 ZZH RX MED GY IP 250 OP 250 PS 637: Performed by: INTERNAL MEDICINE

## 2017-09-11 PROCEDURE — 85025 COMPLETE CBC W/AUTO DIFF WBC: CPT | Performed by: INTERNAL MEDICINE

## 2017-09-11 PROCEDURE — G0378 HOSPITAL OBSERVATION PER HR: HCPCS

## 2017-09-11 PROCEDURE — 85610 PROTHROMBIN TIME: CPT | Performed by: INTERNAL MEDICINE

## 2017-09-11 PROCEDURE — 80048 BASIC METABOLIC PNL TOTAL CA: CPT | Performed by: INTERNAL MEDICINE

## 2017-09-11 PROCEDURE — 99207 ZZC CDG-MDM COMPONENT: MEETS MODERATE - UP CODED: CPT | Performed by: INTERNAL MEDICINE

## 2017-09-11 PROCEDURE — 99226 ZZC SUBSEQUENT OBSERVATION CARE,LEVEL III: CPT | Performed by: INTERNAL MEDICINE

## 2017-09-11 PROCEDURE — S0176 HYDROXYUREA 500 MG: HCPCS | Performed by: INTERNAL MEDICINE

## 2017-09-11 PROCEDURE — 97161 PT EVAL LOW COMPLEX 20 MIN: CPT | Mod: GP

## 2017-09-11 PROCEDURE — 40000193 ZZH STATISTIC PT WARD VISIT

## 2017-09-11 PROCEDURE — 99207 ZZC CDG-CODE CATEGORY CHANGED: CPT | Performed by: INTERNAL MEDICINE

## 2017-09-11 PROCEDURE — 83605 ASSAY OF LACTIC ACID: CPT | Performed by: INTERNAL MEDICINE

## 2017-09-11 PROCEDURE — G8980 MOBILITY D/C STATUS: HCPCS | Mod: GP,CI

## 2017-09-11 PROCEDURE — 87631 RESP VIRUS 3-5 TARGETS: CPT | Performed by: INTERNAL MEDICINE

## 2017-09-11 PROCEDURE — 25000128 H RX IP 250 OP 636: Performed by: INTERNAL MEDICINE

## 2017-09-11 PROCEDURE — 36415 COLL VENOUS BLD VENIPUNCTURE: CPT | Performed by: INTERNAL MEDICINE

## 2017-09-11 PROCEDURE — G8978 MOBILITY CURRENT STATUS: HCPCS | Mod: GP,CI

## 2017-09-11 RX ORDER — WARFARIN SODIUM 1 MG/1
1 TABLET ORAL
Status: COMPLETED | OUTPATIENT
Start: 2017-09-11 | End: 2017-09-11

## 2017-09-11 RX ADMIN — SODIUM CHLORIDE: 9 INJECTION, SOLUTION INTRAVENOUS at 03:21

## 2017-09-11 RX ADMIN — WARFARIN SODIUM 1 MG: 1 TABLET ORAL at 18:59

## 2017-09-11 RX ADMIN — HYDROXYUREA 1500 MG: 500 CAPSULE ORAL at 09:05

## 2017-09-11 RX ADMIN — SODIUM CHLORIDE: 9 INJECTION, SOLUTION INTRAVENOUS at 07:45

## 2017-09-11 RX ADMIN — SIMVASTATIN 20 MG: 20 TABLET, FILM COATED ORAL at 21:47

## 2017-09-11 RX ADMIN — LEVOTHYROXINE SODIUM 37.5 MCG: 50 TABLET ORAL at 08:51

## 2017-09-11 RX ADMIN — METOPROLOL SUCCINATE 50 MG: 50 TABLET, EXTENDED RELEASE ORAL at 21:47

## 2017-09-11 RX ADMIN — METOPROLOL SUCCINATE 50 MG: 50 TABLET, EXTENDED RELEASE ORAL at 08:52

## 2017-09-11 RX ADMIN — SODIUM CHLORIDE: 9 INJECTION, SOLUTION INTRAVENOUS at 13:13

## 2017-09-11 NOTE — PLAN OF CARE
Problem: Goal Outcome Summary  Goal: Goal Outcome Summary  PT: Orders received, evaluation completed. Patient currently admitted under observation status. 89 year old female admitted with weakness and dehydration. PMH significant for dinah jordan fib. Pt not able to provide any PLOF information, but is conversational. Per chart, patient resides in detention and uses a walker for mobility. Unclear how much support detention can/will provide.      Discharge Planner PT   Patient plan for discharge: Not stated.   Current status: Supine to sit with min A x 1 person and increased time. Sit to/from stand with min A x 1 and CGA of another. Pt ambulates 10' with FWW and CGA x 1. No LE buckling noted. Sit to supine with min A x 1. Min A x 2 needed to reposition hips in bed.   Barriers to return to prior living situation: Unclear level of support available. Pt would need assist of 1-2 for safe mobility.   Recommendations for discharge: Patient would be safe to return to detention if staff can provide assist x 1 for all mobility/ADL's, use of FWW and with HHPT. If staff cannot provide assist x 1, patient would need TCU.   Rationale for recommendations: Currently needing assist x 1 for safe mobility.        Entered by: Ayana Diggs 09/11/2017 2:39 PM      No further skilled acute care PT needs identified under observation status. Appropriate to defer treatment to next level of care.

## 2017-09-11 NOTE — PLAN OF CARE
"Problem: Goal Outcome Summary  Goal: Goal Outcome Summary  PT: Orders received, chart reviewed. Patient currently admitted under observation status. Per chart, patient reside in assisted living and can ambulate with a a walker. Unclear how much support the jail can provide. Attempted evaluation. Pt alert, but declines at this time: \"I just can't right now, I'm too weak\". Will continue to follow and perform OOB evaluation as pt agreeable.       "

## 2017-09-11 NOTE — PLAN OF CARE
Problem: Goal Outcome Summary  Goal: Goal Outcome Summary  Outcome: Improving  Care Plan Summary Note: vss, alert to self, place and family member only. Denied pain. +BS, lungs clear, on RA, denied SOB. Tolerating oral intake, good appetite. Per daughter, whom was at bedside, she was doing much better and less confused today. PIV patent and infusing. Refused turning Q 2 hours at the beginning of the shift, but agreeable at 11am. Severe kyphosis. Refused PT. Pt was living at a assisted facility, was using walker prior to admission. Slight redness in groin, powder applied. INC of B&B. Bruises on buttock, pt unable to provide detail on that. Fall precaution in place. Pleasant but forgetful. Will continue to monitor.       Update: PT reassessed pt, needing ax1 getting back to bed. Will update MD pending d/c to assist living.

## 2017-09-11 NOTE — PLAN OF CARE
Problem: Goal Outcome Summary  Goal: Goal Outcome Summary  Outcome: No Change  Lethargic. Disoriented to time and situation. Pt has baseline dementia however she is normally more conversational and alert per pt daughter. Does not consistently respond to staff. Denies pain however yells out when being repositioned. Incontinent; does not notify staff when wet. IV infusing. Significant kyphosis. Up with lift. Daughter reports that pt uses 4 wheel walker at home. Poor appetite; pt has not eaten since yesterday per daughter report. Reports nausea but declines any intervention with medication. No emesis. Lactic acid trending down. Ecchymotic bottom. BLE zaida and edematous. Discharge TBD.

## 2017-09-11 NOTE — PROGRESS NOTES
09/11/17 1400   Quick Adds   Type of Visit Initial PT Evaluation   Living Environment   Living Environment Comment Patient currently not able to provide any PLOF information. Per H&P patient is alert and conversational at baseline, but has dementia. Per chart, patient resides in an [facility] and uses a walker for ambulation.    Self-Care   Usual Activity Tolerance (Unknown)   Current Activity Tolerance fair   Equipment Currently Used at Home walker, rolling   Functional Level Prior   Ambulation 1-->assistive equipment   Transferring 1-->assistive equipment   Cognition (Baseline dementia)   General Information   Onset of Illness/Injury or Date of Surgery - Date 09/10/17   Referring Physician MD Yari   Patient/Family Goals Statement Not stated.    Pertinent History of Current Problem (include personal factors and/or comorbidities that impact the POC) 89 year old female admitted with weakness and dehydration. PMH significant for dinah jordan.    Precautions/Limitations fall precautions   Cognitive Status Examination   Orientation person  (Disoriented to situation, time and reason for admission)   Level of Consciousness alert   Follows Commands and Answers Questions able to follow single-step instructions   Pain Assessment   Patient Currently in Pain No   Posture    Posture Forward head position;Protracted shoulders;Kyphosis   Range of Motion (ROM)   ROM Comment Decreased LE AROM bilaterally. Appears to be due to weakness. Pt uses UE's to reposition LE's in bed.    Strength   Strength Comments Decreased generally, needs assist for physical mobility at this time.    Bed Mobility   Bed Mobility Comments Supine to sit with min A x 1 and increased time. Sit to supine with min A x 1 person for LE's. Needs min A x 2 persons and cues for bridge technique to reposition in bed.    Transfer Skills   Transfer Comments Sit to/from stand with min A x 1 person.    Gait   Gait Comments Ambulates 10' with FWW and CGA.    Balance  "  Balance Comments Requires bilateral UE support on walker for safe mobility at this time.    Coordination   Coordination no deficits were identified   Clinical Impression   Criteria for Skilled Therapeutic Intervention evaluation only  (OBS status)   PT Diagnosis Impaired gait, generalized weakness   Influenced by the following impairments Weakness   Functional limitations due to impairments Decreased independence with functional mobility   Clinical Presentation Stable/Uncomplicated   Clinical Presentation Rationale Medically stable.    Clinical Decision Making (Complexity) Low complexity   Anticipated Discharge Disposition Transitional Care Facility  (V. Return to [facility] with Ax1 for mobility and HHPT)   Risk & Benefits of therapy have been explained Yes   Patient, Family & other staff in agreement with plan of care Yes   1x Eval Only-Outpatient/Observation Medicare G-Code   G-code Mobility: Walking & Moving Around   Mobility: Walking & Moving Around   Mobility: Current Status , Goal , Discharge -Xmrr Only- Modifier the same for all G-codes CI: 1-19% impairment   [facility] OpenQ-PAC TM \"6 Clicks\"   2016, Trustees of [facility], under license to [employer].  All rights reserved.   6 Clicks Short Forms Basic Mobility Inpatient Short Form   [facility] AM-PAC  \"6 Clicks\" V.2 Basic Mobility Inpatient Short Form   1. Turning from your back to your side while in a flat bed without using bedrails? 3 - A Little   2. Moving from lying on your back to sitting on the side of a flat bed without using bedrails? 3 - A Little   3. Moving to and from a bed to a chair (including a wheelchair)? 3 - A Little   4. Standing up from a chair using your arms (e.g., wheelchair, or bedside chair)? 3 - A Little   5. To walk in hospital room? 3 - A Little   6. Climbing 3-5 steps with a railing? 1 - Total   Basic Mobility Raw Score (Score out of 24.Lower scores equate to lower levels of function) 16   Total " Evaluation Time   Total Evaluation Time (Minutes) 13

## 2017-09-11 NOTE — PROGRESS NOTES
Care Transition Initial Assessment - AMADO  Reason For Consult: discharge planning  Met with: Patient and Family    Active Problems:    Dehydration         DATA  Lives With: spouse, facility resident  Living Arrangements: assisted living  Description of Support System: Supportive, Involved  Who is your support system?: , Children, Facility resident(s)/Staff  Support Assessment: Adequate family and caregiver support.   Identified issues/concerns regarding health management:     SW received request for consult to assist with discharge planning. Patient admitted Observation Status on 9/10/17 for Dehydration. Tentative discharge date 9/12/17. AMADO met with patient. Patient currently resides at [facility] in the [facility]. SW discussed with patient returning to her long term vs discharging to TCU. Patient requesting SW contact her family to further discuss discharge planning. SW attempted to reach patient's daughter Nicol, however there was no answer and a generic voicemail. SW contacted patient's  [place], and discussed above recommendations of TCU vs long term with increased services. Informed patient's  of TCU coverage through [facility], TCU facilities, and costs of private room (as requested by him). Patient's spouse stating he would prefer for the patient to return home, as he feels [place] could manage her needs. Patient's  would also be agreeable to home care services. AMADO spoke with Freida (ERENDIRA) at [facility] (P: [phone], F: [phone]). AMADO updated Freida on information from PT evaluation, and recommendations. Per Freida, patient currently receives assistance with bathing/showering, grooming/dressing, toileting, and can have transfer assistance. Freida stated that patient appears to mostly be at her baseline, and they would be able to assist with transfers. Freida was also in agreement with patient having home care services at discharge. Per Freida, they would be able to accept  patient back in the AM, and would like discharge orders faxed. Freida also stated that influenza has been going around the nursing home, and patient's  was recently ill. SW updated MD on above information.         Quality Of Family Relationships: supportive, involved, helpful  Transportation Available: family or friend will provide      ASSESSMENT  Cognitive Status:  Patient was very tired during conversation.   Concerns to be addressed: discharge planning.       PLAN  Financial costs for the patient includes: N/A.  Patient given options and choices for discharge: discussed recommendations of TCU vs return to nursing home with home care.  Patient/family is agreeable to the plan? Patient's  prefers return to nursing home.   Patient Goals and Preferences: return to nursing home.  Patient anticipates discharging to: Back to [facility] with home care.    Pebbles Vazquez, DORIE, LGSW

## 2017-09-11 NOTE — PROGRESS NOTES
Care Coordinator met with pt this AM, left message for her Daughter Nicol and then discussed Observation with pt's Spouse [place] since did not receive call back from pt's daughter.  Pt resides at [facility] and per pt's spouse, she receives bathing help 2x per week.  She is able to dress herself and her spouse manages her medications.  Pt ambulates with a walker at baseline.  She is tolerating diet and was refusing to get out of bed per Nsg.  SW consult pending, as PT is recommending TCU.  Pt has [employer], so would most likely have TCU coverage.

## 2017-09-11 NOTE — PROGRESS NOTES
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     1. Dehydration, weakness after suspected viral gastroenteritis.   -Patient reportedly had multiple emesis and low-grade fever at home.  -Accordingly also the patient's  had a GI illness in the past week.  -Lactic acid 3.2 in emergency department; improved to normal with hydration   -No further diarrhea or vomiting  -Evaluated by PT, they're recommending transitional care unit placement  -Await evaluation by  and care coordinator     2. 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   -Continue warfarin and metoprolol. INR is therapeutic.     3. History of thrombocytosis.   -Continue hydroxyurea.      4. Hypothyroidism.   -Continue Synthroid.     5. Venous stasis changes and chronic lower extremity edema. -Holding patient's home lasix   -can resume at discharge  -Discontinue IV fluids    D/W: RN  DVT Prophylaxis: Warfarin  Code Status: DNR/DNI    Disposition: Expected discharge later today or in the a.m., pending placement    Anderson Pritchard MD    Interval History   No further nausea or vomiting.  Patient feels much better.  Denies abdominal pain.  No chest pain or dyspnea    -Data reviewed today: I reviewed all new labs and imaging results over the last 24 hours. I personally reviewed no images or EKG's today.    Physical Exam   Temp: 98.3  F (36.8  C) Temp src: Oral BP: 111/61 Pulse: 98 Heart Rate: 66 Resp: 18 SpO2: 94 % O2 Device: None (Room air)    Vitals:    09/10/17 1656 09/11/17 0655   Weight: 89.5 kg  (197 lb 5 oz) 87.7 kg (193 lb 5.5 oz)     Vital Signs with Ranges  Temp:  [98.3  F (36.8  C)-98.9  F (37.2  C)] 98.3  F (36.8  C)  Pulse:  [98] 98  Heart Rate:  [66-85] 66  Resp:  [16-18] 18  BP: (101-136)/(55-73) 111/61  SpO2:  [94 %-96 %] 94 %  I/O last 3 completed shifts:  In: 2985 [P.O.:1060; I.V.:1925]  Out: -     Constitutional: AAOX2, NAD, Appears comfortable  HEENT: Moist oral mucosa, no oral lesions, No pallor or icterus  Neck- Supple, Good ROM, No JVD  Respiratory:  No crackles, No wheezes, CTA B/L, Normal WOB  Cardiovascular: RRR, No murmur  GI: Soft, Non- tender, BS- normoactive, No Guarding/rebound/rigidity  Skin/Integument: Warm and dry, no rashes  MSK: Chronic venous stasis with 1+ edema and venous discoloration  Neuro: CN- grossly intact    Medications     Warfarin Therapy Reminder       NaCl 100 mL/hr at 09/11/17 1313       warfarin  1 mg Oral ONCE at 18:00     levothyroxine (SYNTHROID/LEVOTHROID) half-tab 37.5 mcg  37.5 mcg Oral Daily     simvastatin (ZOCOR) tablet 20 mg  20 mg Oral At Bedtime     metoprolol  50 mg Oral BID     sodium chloride (PF)  3 mL Intracatheter Q8H     hydroxyurea  1,500 mg Oral Daily       Data     Recent Labs  Lab 09/11/17  0733 09/10/17  1450   WBC 5.6 3.7*   HGB 11.6* 13.3   * 120*    301   INR 3.01* 2.49*    141   POTASSIUM 3.7 4.4   CHLORIDE 109 103   CO2 27 30   BUN 32* 34*   CR 1.06* 1.08*   ANIONGAP 6 8   KACEY 8.8 9.1   * 197*   ALBUMIN  --  3.5   PROTTOTAL  --  7.9   BILITOTAL  --  0.9   ALKPHOS  --  65   ALT  --  19   AST  --  21   LIPASE  --  230       No results found for this or any previous visit (from the past 24 hour(s)).

## 2017-09-11 NOTE — PROGRESS NOTES
"BRIEF NUTRITION ASSESSMENT      REASON FOR ASSESSMENT:  Admission screen -  Reduced oral intake over the last month    NUTRITION HISTORY:  Per resides in [facility].   Per H&P\" patient's  had a GI illness in past week and patient was up on night of 9/9 with emesis and additional emesis this morning (9/10).\"    CURRENT DIET AND INTAKE:  Diet:  Regular.  Pt is eating 100%.              ANTHROPOMETRICS:  Height: 5' 4\"  Weight: 87.7 kg  BMI: 33.19 kg/m2  IBW:  54.5 kg +/- 10%  Weight Status: Obesity Grade I BMI 30-34.9  %IBW: 160%  Weight History: No recent hx.  Wt Readings from Last 10 Encounters:   09/11/17 87.7 kg (193 lb 5.5 oz)   09/16/13 95.3 kg (210 lb)   09/13/13 98.4 kg (217 lb)   09/11/13 99.3 kg (219 lb)   09/05/13 99.7 kg (219 lb 11.2 oz)   05/13/13 95.3 kg (210 lb)     LABS:  Labs noted    MALNUTRITION:  Patient does not meet two of the following criteria necessary for diagnosing malnutrition: significant weight loss, reduced intake, subcutaneous fat loss, muscle loss or fluid retention    NUTRITION INTERVENTION:  Nutrition Diagnosis:  No nutrition diagnosis at this time.    Implementation:  Nutrition Education:  Per Provider order if indicated.    FOLLOW UP/MONITORING:   Will re-evaluate in 7 - 10 days, or sooner, if re-consulted.    Yin Busch RD  Pager [phone] (M-F)            [phone] (W/E & Hol)            "

## 2017-09-11 NOTE — PLAN OF CARE
Problem: Goal Outcome Summary  Goal: Goal Outcome Summary  Outcome: No Change  A&O to self only. VSS. Denied pain. Lung sounds clear. IV infusing at 100 ml/hr. Significant kyphosis present. Edema noted to lower limbs. Incontinent of bowels and bladder. Large bruise present to buttocks. Declines repositioning to sides. To be transferred with lift. Discharge TBD.

## 2017-09-12 VITALS
OXYGEN SATURATION: 97 % | HEART RATE: 65 BPM | HEIGHT: 64 IN | TEMPERATURE: 98.2 F | DIASTOLIC BLOOD PRESSURE: 56 MMHG | SYSTOLIC BLOOD PRESSURE: 105 MMHG | BODY MASS INDEX: 33.57 KG/M2 | RESPIRATION RATE: 18 BRPM | WEIGHT: 196.65 LBS

## 2017-09-12 LAB
CREAT SERPL-MCNC: 1.04 MG/DL (ref 0.52–1.04)
GFR SERPL CREATININE-BSD FRML MDRD: 50 ML/MIN/1.7M2
INR PPP: 3.42 (ref 0.86–1.14)

## 2017-09-12 PROCEDURE — 85610 PROTHROMBIN TIME: CPT | Performed by: INTERNAL MEDICINE

## 2017-09-12 PROCEDURE — S0176 HYDROXYUREA 500 MG: HCPCS | Performed by: INTERNAL MEDICINE

## 2017-09-12 PROCEDURE — 82565 ASSAY OF CREATININE: CPT | Performed by: INTERNAL MEDICINE

## 2017-09-12 PROCEDURE — G0378 HOSPITAL OBSERVATION PER HR: HCPCS

## 2017-09-12 PROCEDURE — 99217 ZZC OBSERVATION CARE DISCHARGE: CPT | Performed by: INTERNAL MEDICINE

## 2017-09-12 PROCEDURE — 36415 COLL VENOUS BLD VENIPUNCTURE: CPT | Performed by: INTERNAL MEDICINE

## 2017-09-12 PROCEDURE — 25000132 ZZH RX MED GY IP 250 OP 250 PS 637: Performed by: INTERNAL MEDICINE

## 2017-09-12 RX ORDER — WARFARIN SODIUM 2 MG/1
1 TABLET ORAL DAILY
Qty: 30 TABLET | Refills: 0 | Status: ON HOLD | COMMUNITY
Start: 2017-09-12 | End: 2018-02-17

## 2017-09-12 RX ORDER — FUROSEMIDE 20 MG
20 TABLET ORAL DAILY
Qty: 30 TABLET | Refills: 0 | Status: SHIPPED | OUTPATIENT
Start: 2017-09-12 | End: 2017-12-16

## 2017-09-12 RX ADMIN — HYDROXYUREA 1500 MG: 500 CAPSULE ORAL at 09:32

## 2017-09-12 RX ADMIN — METOPROLOL SUCCINATE 50 MG: 50 TABLET, EXTENDED RELEASE ORAL at 09:33

## 2017-09-12 RX ADMIN — LEVOTHYROXINE SODIUM 37.5 MCG: 50 TABLET ORAL at 09:33

## 2017-09-12 RX ADMIN — ACETAMINOPHEN 650 MG: 325 TABLET, FILM COATED ORAL at 04:40

## 2017-09-12 NOTE — PLAN OF CARE
Problem: Goal Outcome Summary  Goal: Goal Outcome Summary  Outcome: No Change  Alert & oriented to self only, baseline dementia, 2 assist/walker/belt, VSS on RA, c/o neck pain, reposition and prn tylenol given, lung sound clear, incontinence, lower extremities zaida, 2+ edema present, midline buttock wound, pink and red, mepilex applied, current INR 3.0, possible discharge today pending placement.

## 2017-09-12 NOTE — PROGRESS NOTES
Pt is doing well on Day #2 Observation.  Planning discharge back to [facility] today, pending rounding Hospitalist assessment.  Please see SW note for custodial information.  Discussed home PT with pt's Daughter Nicol.  She did not feel this would be of benefit.  Nicol will schedule pt's follow-up appointment with her [facility] PCP, Dr Park.  Nicol will transport pt home.

## 2017-09-12 NOTE — PLAN OF CARE
Problem: Goal Outcome Summary  Goal: Goal Outcome Summary  OT order received. Per family, decline therapy services.  Will complete orders.

## 2017-09-12 NOTE — PLAN OF CARE
Problem: Goal Outcome Summary  Goal: Goal Outcome Summary  Outcome: Improving   Pt A&O to self and daughter. On RA. VSS. Good appetite and oral intake with set up. Pt able to make needs known and rounded on frequently. Incont. Some refusal of turns at times d/t generalized pain. Edema in BLE. Pt assisted with changes. Bed alarm on for safety.

## 2017-09-12 NOTE — DISCHARGE SUMMARY
[facility]  Discharge Summary        Antoinette Cowan MRN# [id number]   YOB: 1928 Age: 89 year old     Date of Admission:  9/10/2017  Date of Discharge:  9/12/2017  Admitting Physician:  Renée Greenberg MD  Discharge Physician: Fela Clark MD  Discharging Service: Hospitalist     Primary Provider: Linda Park  Primary Care Physician Phone Number: [phone]         Discharge Diagnoses/Problem Oriented Hospital Course (Providers):    Antoinette Cowan was admitted on 9/10/2017 by Renée Greenberg MD and I would refer you to their history and physical.  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      1. Dehydration, weakness after suspected viral gastroenteritis.   -Patient reportedly had multiple emesis and low-grade fever at home.  -Accordingly also the patient's  had a GI illness in the past week.  -Lactic acid 3.2 in emergency department; improved to normal with hydration   -No further diarrhea or vomiting  -Evaluated by PT, they're recommending transitional care unit placement  -pt and family declined TCU and discharging to [facility] with home PT, RN care       2. 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   -Continue warfarin and metoprolol. INR is therapeutic.      3. History of thrombocytosis.   -Continue hydroxyurea.       4. Hypothyroidism.   -Continue Synthroid.      5. Venous stasis changes and chronic lower extremity edema. -Holding patient's home lasix   -daughter is concerned that pt's oral intake is poor and she is having  dehydration issues   So home lasix was reduced from 40mg to 20mg at discharge    D/W: RN  DVT Prophylaxis: Warfarin  Code Status: DNR/DNI     Disposition: [facility] today         Code Status:      DNR / DNI        Brief Hospital Stay Summary Sent Home With Patient in AVS:               Important Results:      See below         Pending Results:        Unresulted Labs Ordered in the Past 30 Days of this Admission     No orders found from 7/12/2017 to 9/11/2017.            Discharge Instructions and Follow-Up:      Follow-up Appointments     Follow-up and recommended labs and tests        Follow up with primary care provider, Linda Park, within 7 days for   hospital follow- up.  The following labs/tests are recommended: recheck   BMP and INR in 1week .                      Discharge Disposition:      Discharged to home        Discharge Medications:        Discharge Medication List as of 9/12/2017  9:41 AM      CONTINUE these medications which have CHANGED    Details   furosemide (LASIX) 20 MG tablet Take 1 tablet (20 mg) by mouth daily, Disp-30 tablet, R-0, E-Prescribe      warfarin (COUMADIN) 2 MG tablet Take 1 tablet (2 mg) by mouth daily Hold coumadin tonight and recheck INR tomorrow and restart, Disp-30 tablet, R-0, Historical         CONTINUE these medications which have NOT CHANGED    Details   SIMVASTATIN PO Take 20 mg by mouth At Bedtime, Historical      nystatin (MYCOSTATIN) 686707 UNIT/GM POWD Apply topically 2 times daily as neededHistorical      metoprolol (TOPROL XL) 50 MG 24 hr tablet Take 50 mg by mouth 2 times daily, Historical      Calcium Carbonate-Vitamin D (CALTRATE 600+D PO) Take 1 tablet by mouth daily , Historical      CHOLECALCIFEROL Take 1,000 Units by mouth daily, Historical      cyanocobalamin 500 MCG TABS Take 500 mcg by mouth daily , Historical      hydroxyurea (HYDREA) 500 MG capsule Take 1,500 mg by mouth daily, Historical      Levothyroxine Sodium (SYNTHROID PO) Take 37.5 mcg by mouth  "daily , Historical         STOP taking these medications       POTASSIUM CHLORIDE Comments:   Reason for Stopping:                 Allergies:         Allergies   Allergen Reactions     Ciprofloxacin Hives     Vicodin [Hydrocodone-Acetaminophen] Rash           Consultations This Hospital Stay:      No consultations were requested during this admission        Condition and Physical on Discharge:      Discharge condition: Stable   Vitals: Blood pressure 105/56, pulse 65, temperature 98.2  F (36.8  C), temperature source Oral, resp. rate 18, height 1.626 m (5' 4\"), weight 89.2 kg (196 lb 10.4 oz), SpO2 97 %.     Constitutional: Alert and awake   Lungs: CTA   Cardiovascular: RRR   Abdomen: Soft, NT, ND< BS+   Skin: Venosus stasis changes in legs. No edema    Other:          Discharge Time:      Less  than 30 minutes.        Image Results From This Hospital Stay (For Non-EPIC Providers):        Results for orders placed or performed during the hospital encounter of 09/10/17   Chest XR,  PA & LAT    Narrative    CHEST TWO VIEW   9/10/2017 3:24 PM     HISTORY: Confusion, edema.    COMPARISON: None.    FINDINGS: Kyphosis. Vertebroplasty cement in lower thoracic and upper  lumbar vertebrae. Hiatal hernia. Heart and lungs negative.      Impression    IMPRESSION: No acute abnormality. Hiatal hernia. Cardiomegaly. No  infiltrates.    EVA TAPIA MD           Most Recent Lab Results In EPIC (For Non-EPIC Providers):    Most Recent 3 CBC's:  Recent Labs   Lab Test  09/11/17   0733  09/10/17   1450 08/25/13   WBC  5.6  3.7*  11.4   HGB  11.6*  13.3  13.3   MCV  121*  120*  99   PLT  291  301  290      Most Recent 3 BMP's:  Recent Labs   Lab Test  09/12/17   0720  09/11/17   0733  09/10/17   1450 08/25/13   NA   --   142  141  138   POTASSIUM   --   3.7  4.4  4.3   CHLORIDE   --   109  103  101   CO2   --   27  30   --    BUN   --   32*  34*  26   CR  1.04  1.06*  1.08*  1.01   ANIONGAP   --   6  8  15   KACEY   --   8.8  9.1  " 9.7   GLC   --   108*  197*  158*     Most Recent 3 Troponin's:No lab results found.    Invalid input(s): TROP, TROPONINIES  Most Recent 3 INR's:  Recent Labs   Lab Test  09/12/17   0720  09/11/17   0733  09/10/17   1450   INR  3.42*  3.01*  2.49*     Most Recent 2 LFT's:  Recent Labs   Lab Test  09/10/17   1450   AST  21   ALT  19   ALKPHOS  65   BILITOTAL  0.9     Most Recent Cholesterol Panel:No lab results found.  Most Recent 6 Bacteria Isolates From Any Culture (See EPIC Reports for Culture Details):No lab results found.  Most Recent TSH, T4 and HgbA1c: No lab results found.

## 2017-09-12 NOTE — PLAN OF CARE
Pt's daughter, Norah,  came in to see pt today and talk with SW. SW had left for the evening and daughter will call and return in the AM. Daughter would like pt to return to previous facility and stated that she was attended to well and rounded on frequently. Pt also has a secondary caregiver available if pt needs. Daughter did not think a different TCU would benefit pt and was concerned about the change of environment and pt's orientation.

## 2017-09-12 NOTE — PLAN OF CARE
Problem: Goal Outcome Summary  Goal: Goal Outcome Summary  Outcome: Adequate for Discharge Date Met:  09/12/17  Care Plan Summary Note: vss, alert to self only. Pleasant. Denied pain. Tolerating oral diet well, good appetite. Lungs clear. +BS. Up in chair for breakfast,refused at first, but agreed after several attempts. SBS x1 with walker. Very slow, but steady. Daughter at bedside, stating it was pt's baseline. D/c back to assisted living place with home PT/RN. Daughter to transport, whom also was given d/c instruction, verbalized understanding.. D/c at 1000 via w/c in stable condition.

## 2017-09-12 NOTE — PROGRESS NOTES
SW  D: Family transporting today back to [facility].  Reviewed with bedside nurse.  Writer updated nurse Olson at [phone] and faxed orders to her at [phone].  No further involvement needed.

## 2017-09-20 LAB — INTERPRETATION ECG - MUSE: NORMAL

## 2017-12-16 ENCOUNTER — APPOINTMENT (OUTPATIENT)
Dept: GENERAL RADIOLOGY | Facility: CLINIC | Age: 82
End: 2017-12-16
Attending: EMERGENCY MEDICINE
Payer: COMMERCIAL

## 2017-12-16 ENCOUNTER — HOSPITAL ENCOUNTER (OUTPATIENT)
Facility: CLINIC | Age: 82
Setting detail: OBSERVATION
Discharge: HOME HEALTH | End: 2017-12-18
Attending: EMERGENCY MEDICINE | Admitting: INTERNAL MEDICINE
Payer: COMMERCIAL

## 2017-12-16 ENCOUNTER — APPOINTMENT (OUTPATIENT)
Dept: CT IMAGING | Facility: CLINIC | Age: 82
End: 2017-12-16
Attending: EMERGENCY MEDICINE
Payer: COMMERCIAL

## 2017-12-16 DIAGNOSIS — Z79.01 LONG TERM CURRENT USE OF ANTICOAGULANT THERAPY: ICD-10-CM

## 2017-12-16 DIAGNOSIS — S22.41XA CLOSED FRACTURE OF MULTIPLE RIBS OF RIGHT SIDE, INITIAL ENCOUNTER: ICD-10-CM
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
## 2017-12-16 PROBLEM — S22.31XA FRACTURE OF RIB OF RIGHT SIDE: Status: ACTIVE | Noted: 2017-12-16

## 2017-12-16 LAB
ANION GAP SERPL CALCULATED.3IONS-SCNC: 5 MMOL/L (ref 3–14)
BASOPHILS # BLD AUTO: 0 10E9/L (ref 0–0.2)
BASOPHILS NFR BLD AUTO: 0.2 %
BUN SERPL-MCNC: 26 MG/DL (ref 7–30)
CALCIUM SERPL-MCNC: 9.5 MG/DL (ref 8.5–10.1)
CHLORIDE SERPL-SCNC: 103 MMOL/L (ref 94–109)
CO2 SERPL-SCNC: 34 MMOL/L (ref 20–32)
CREAT SERPL-MCNC: 1.2 MG/DL (ref 0.52–1.04)
DIFFERENTIAL METHOD BLD: ABNORMAL
EOSINOPHIL # BLD AUTO: 0.1 10E9/L (ref 0–0.7)
EOSINOPHIL NFR BLD AUTO: 0.9 %
ERYTHROCYTE [DISTWIDTH] IN BLOOD BY AUTOMATED COUNT: 15.2 % (ref 10–15)
GFR SERPL CREATININE-BSD FRML MDRD: 42 ML/MIN/1.7M2
GLUCOSE SERPL-MCNC: 163 MG/DL (ref 70–99)
HCT VFR BLD AUTO: 35.6 % (ref 35–47)
HGB BLD-MCNC: 11.8 G/DL (ref 11.7–15.7)
IMM GRANULOCYTES # BLD: 0 10E9/L (ref 0–0.4)
IMM GRANULOCYTES NFR BLD: 0.4 %
INR PPP: 2.2 (ref 0.86–1.14)
INTERPRETATION ECG - MUSE: NORMAL
LYMPHOCYTES # BLD AUTO: 0.7 10E9/L (ref 0.8–5.3)
LYMPHOCYTES NFR BLD AUTO: 12.8 %
MCH RBC QN AUTO: 41.1 PG (ref 26.5–33)
MCHC RBC AUTO-ENTMCNC: 33.1 G/DL (ref 31.5–36.5)
MCV RBC AUTO: 124 FL (ref 78–100)
MONOCYTES # BLD AUTO: 0.3 10E9/L (ref 0–1.3)
MONOCYTES NFR BLD AUTO: 5 %
NEUTROPHILS # BLD AUTO: 4.3 10E9/L (ref 1.6–8.3)
NEUTROPHILS NFR BLD AUTO: 80.7 %
NRBC # BLD AUTO: 0 10*3/UL
NRBC BLD AUTO-RTO: 0 /100
PLATELET # BLD AUTO: 187 10E9/L (ref 150–450)
POTASSIUM SERPL-SCNC: 4.5 MMOL/L (ref 3.4–5.3)
RBC # BLD AUTO: 2.87 10E12/L (ref 3.8–5.2)
SODIUM SERPL-SCNC: 142 MMOL/L (ref 133–144)
T4 FREE SERPL-MCNC: 1.35 NG/DL (ref 0.76–1.46)
TSH SERPL DL<=0.005 MIU/L-ACNC: 14.32 MU/L (ref 0.4–4)
WBC # BLD AUTO: 5.4 10E9/L (ref 4–11)

## 2017-12-16 PROCEDURE — 84439 ASSAY OF FREE THYROXINE: CPT | Performed by: EMERGENCY MEDICINE

## 2017-12-16 PROCEDURE — 84443 ASSAY THYROID STIM HORMONE: CPT | Performed by: EMERGENCY MEDICINE

## 2017-12-16 PROCEDURE — 71101 X-RAY EXAM UNILAT RIBS/CHEST: CPT | Mod: RT

## 2017-12-16 PROCEDURE — 85025 COMPLETE CBC W/AUTO DIFF WBC: CPT | Performed by: EMERGENCY MEDICINE

## 2017-12-16 PROCEDURE — 85610 PROTHROMBIN TIME: CPT | Performed by: EMERGENCY MEDICINE

## 2017-12-16 PROCEDURE — 96365 THER/PROPH/DIAG IV INF INIT: CPT

## 2017-12-16 PROCEDURE — G0378 HOSPITAL OBSERVATION PER HR: HCPCS

## 2017-12-16 PROCEDURE — 99220 ZZC INITIAL OBSERVATION CARE,LEVL III: CPT | Performed by: INTERNAL MEDICINE

## 2017-12-16 PROCEDURE — 99285 EMERGENCY DEPT VISIT HI MDM: CPT | Mod: 25

## 2017-12-16 PROCEDURE — 25000128 H RX IP 250 OP 636: Performed by: EMERGENCY MEDICINE

## 2017-12-16 PROCEDURE — 25000132 ZZH RX MED GY IP 250 OP 250 PS 637: Performed by: INTERNAL MEDICINE

## 2017-12-16 PROCEDURE — 93005 ELECTROCARDIOGRAM TRACING: CPT

## 2017-12-16 PROCEDURE — 70450 CT HEAD/BRAIN W/O DYE: CPT

## 2017-12-16 PROCEDURE — 72125 CT NECK SPINE W/O DYE: CPT

## 2017-12-16 PROCEDURE — 80048 BASIC METABOLIC PNL TOTAL CA: CPT | Performed by: EMERGENCY MEDICINE

## 2017-12-16 RX ORDER — SIMVASTATIN 20 MG
20 TABLET ORAL AT BEDTIME
Status: DISCONTINUED | OUTPATIENT
Start: 2017-12-16 | End: 2017-12-18 | Stop reason: HOSPADM

## 2017-12-16 RX ORDER — AMOXICILLIN 250 MG
1 CAPSULE ORAL 2 TIMES DAILY PRN
Status: DISCONTINUED | OUTPATIENT
Start: 2017-12-16 | End: 2017-12-18 | Stop reason: HOSPADM

## 2017-12-16 RX ORDER — ONDANSETRON 2 MG/ML
4 INJECTION INTRAMUSCULAR; INTRAVENOUS EVERY 6 HOURS PRN
Status: DISCONTINUED | OUTPATIENT
Start: 2017-12-16 | End: 2017-12-18 | Stop reason: HOSPADM

## 2017-12-16 RX ORDER — WARFARIN SODIUM 1 MG/1
1 TABLET ORAL
Status: COMPLETED | OUTPATIENT
Start: 2017-12-16 | End: 2017-12-16

## 2017-12-16 RX ORDER — BUMETANIDE 1 MG/1
4 TABLET ORAL DAILY
Status: DISCONTINUED | OUTPATIENT
Start: 2017-12-16 | End: 2017-12-18 | Stop reason: HOSPADM

## 2017-12-16 RX ORDER — ACETAMINOPHEN 325 MG/1
650 TABLET ORAL EVERY 4 HOURS PRN
Status: DISCONTINUED | OUTPATIENT
Start: 2017-12-16 | End: 2017-12-17

## 2017-12-16 RX ORDER — AMOXICILLIN 250 MG
1 CAPSULE ORAL 2 TIMES DAILY PRN
Status: DISCONTINUED | OUTPATIENT
Start: 2017-12-16 | End: 2017-12-17

## 2017-12-16 RX ORDER — METOPROLOL SUCCINATE 50 MG/1
50 TABLET, EXTENDED RELEASE ORAL 2 TIMES DAILY
Status: DISCONTINUED | OUTPATIENT
Start: 2017-12-16 | End: 2017-12-18 | Stop reason: HOSPADM

## 2017-12-16 RX ORDER — ONDANSETRON 4 MG/1
4 TABLET, ORALLY DISINTEGRATING ORAL EVERY 6 HOURS PRN
Status: DISCONTINUED | OUTPATIENT
Start: 2017-12-16 | End: 2017-12-18 | Stop reason: HOSPADM

## 2017-12-16 RX ORDER — FENTANYL CITRATE 50 UG/ML
25 INJECTION, SOLUTION INTRAMUSCULAR; INTRAVENOUS
Status: COMPLETED | OUTPATIENT
Start: 2017-12-16 | End: 2017-12-16

## 2017-12-16 RX ORDER — BISACODYL 10 MG
10 SUPPOSITORY, RECTAL RECTAL DAILY PRN
Status: DISCONTINUED | OUTPATIENT
Start: 2017-12-16 | End: 2017-12-18 | Stop reason: HOSPADM

## 2017-12-16 RX ORDER — HYDROMORPHONE HYDROCHLORIDE 1 MG/ML
0.2 INJECTION, SOLUTION INTRAMUSCULAR; INTRAVENOUS; SUBCUTANEOUS
Status: DISCONTINUED | OUTPATIENT
Start: 2017-12-16 | End: 2017-12-18 | Stop reason: HOSPADM

## 2017-12-16 RX ORDER — POLYETHYLENE GLYCOL 3350 17 G/17G
17 POWDER, FOR SOLUTION ORAL DAILY PRN
Status: DISCONTINUED | OUTPATIENT
Start: 2017-12-16 | End: 2017-12-16

## 2017-12-16 RX ORDER — ACETAMINOPHEN 650 MG/1
650 SUPPOSITORY RECTAL EVERY 4 HOURS PRN
Status: DISCONTINUED | OUTPATIENT
Start: 2017-12-16 | End: 2017-12-17

## 2017-12-16 RX ORDER — AMOXICILLIN 250 MG
2 CAPSULE ORAL 2 TIMES DAILY PRN
Status: DISCONTINUED | OUTPATIENT
Start: 2017-12-16 | End: 2017-12-17

## 2017-12-16 RX ORDER — POLYETHYLENE GLYCOL 3350 17 G/17G
1 POWDER, FOR SOLUTION ORAL EVERY OTHER DAY
COMMUNITY

## 2017-12-16 RX ORDER — AMOXICILLIN 250 MG
2 CAPSULE ORAL 2 TIMES DAILY PRN
Status: DISCONTINUED | OUTPATIENT
Start: 2017-12-16 | End: 2017-12-18 | Stop reason: HOSPADM

## 2017-12-16 RX ORDER — LEVOTHYROXINE SODIUM 50 UG/1
50 TABLET ORAL DAILY
Status: DISCONTINUED | OUTPATIENT
Start: 2017-12-16 | End: 2017-12-16

## 2017-12-16 RX ORDER — LEVOTHYROXINE SODIUM 75 UG/1
75 TABLET ORAL DAILY
Status: DISCONTINUED | OUTPATIENT
Start: 2017-12-17 | End: 2017-12-18 | Stop reason: HOSPADM

## 2017-12-16 RX ORDER — POTASSIUM CHLORIDE 1500 MG/1
20 TABLET, EXTENDED RELEASE ORAL DAILY
Status: DISCONTINUED | OUTPATIENT
Start: 2017-12-16 | End: 2017-12-18 | Stop reason: HOSPADM

## 2017-12-16 RX ORDER — POLYETHYLENE GLYCOL 3350 17 G/17G
17 POWDER, FOR SOLUTION ORAL DAILY PRN
Status: DISCONTINUED | OUTPATIENT
Start: 2017-12-16 | End: 2017-12-18 | Stop reason: HOSPADM

## 2017-12-16 RX ORDER — NALOXONE HYDROCHLORIDE 0.4 MG/ML
.1-.4 INJECTION, SOLUTION INTRAMUSCULAR; INTRAVENOUS; SUBCUTANEOUS
Status: DISCONTINUED | OUTPATIENT
Start: 2017-12-16 | End: 2017-12-18 | Stop reason: HOSPADM

## 2017-12-16 RX ORDER — HYDROXYUREA 500 MG/1
1000 CAPSULE ORAL DAILY
Status: DISCONTINUED | OUTPATIENT
Start: 2017-12-16 | End: 2017-12-18 | Stop reason: HOSPADM

## 2017-12-16 RX ADMIN — FENTANYL CITRATE 25 MCG: 50 INJECTION, SOLUTION INTRAMUSCULAR; INTRAVENOUS at 09:56

## 2017-12-16 RX ADMIN — SIMVASTATIN 20 MG: 20 TABLET, FILM COATED ORAL at 21:38

## 2017-12-16 RX ADMIN — METOPROLOL SUCCINATE 50 MG: 50 TABLET, EXTENDED RELEASE ORAL at 21:38

## 2017-12-16 RX ADMIN — HYDROXYUREA 1000 MG: 500 CAPSULE ORAL at 23:37

## 2017-12-16 RX ADMIN — BUMETANIDE 4 MG: 1 TABLET ORAL at 17:04

## 2017-12-16 RX ADMIN — ACETAMINOPHEN 650 MG: 325 TABLET, FILM COATED ORAL at 14:38

## 2017-12-16 RX ADMIN — MICONAZOLE NITRATE: 2 POWDER TOPICAL at 22:30

## 2017-12-16 RX ADMIN — POTASSIUM CHLORIDE 20 MEQ: 1500 TABLET, EXTENDED RELEASE ORAL at 14:37

## 2017-12-16 RX ADMIN — FENTANYL CITRATE 25 MCG: 50 INJECTION, SOLUTION INTRAMUSCULAR; INTRAVENOUS at 09:15

## 2017-12-16 RX ADMIN — ACETAMINOPHEN 650 MG: 325 TABLET, FILM COATED ORAL at 18:50

## 2017-12-16 RX ADMIN — ACETAMINOPHEN 650 MG: 325 TABLET, FILM COATED ORAL at 22:34

## 2017-12-16 RX ADMIN — WARFARIN SODIUM 1 MG: 1 TABLET ORAL at 17:04

## 2017-12-16 RX ADMIN — LEVOTHYROXINE SODIUM 50 MCG: 50 TABLET ORAL at 14:37

## 2017-12-16 ASSESSMENT — ACTIVITIES OF DAILY LIVING (ADL)
TRANSFERRING: 1-->ASSISTIVE EQUIPMENT
FALL_HISTORY_WITHIN_LAST_SIX_MONTHS: YES
SWALLOWING: 0-->SWALLOWS FOODS/LIQUIDS WITHOUT DIFFICULTY
COGNITION: 1 - ATTENTION OR MEMORY DEFICITS
NUMBER_OF_TIMES_PATIENT_HAS_FALLEN_WITHIN_LAST_SIX_MONTHS: 2
TOILETING: 2-->ASSISTIVE PERSON
RETIRED_COMMUNICATION: 0-->UNDERSTANDS/COMMUNICATES WITHOUT DIFFICULTY
RETIRED_EATING: 0-->INDEPENDENT
BATHING: 2-->ASSISTIVE PERSON
DRESS: 2-->ASSISTIVE PERSON
AMBULATION: 1-->ASSISTIVE EQUIPMENT
WHICH_OF_THE_ABOVE_FUNCTIONAL_RISKS_HAD_A_RECENT_ONSET_OR_CHANGE?: AMBULATION;TRANSFERRING

## 2017-12-16 NOTE — PLAN OF CARE
Problem: Patient Care Overview  Goal: Plan of Care/Patient Progress Review  Outcome: No Change  Pt is A&Ox2, forgetful to place and time. Hx dementia. Up with 2 assist, VSS on RA, c/o R ribcage/back pain from unwitnessed fall at [facility]. PRN tylenol given. Bilateral lower ext edema +2. PT eval ordered. No IV fluids d/t swelling. Echo ordered. Continue to monitor

## 2017-12-16 NOTE — H&P
History and Physical     Antoinette Cowan MRN# [id number]   YOB: 1928 Age: 89 year old      Date of Admission:  12/16/2017    Primary care provider: Linda Park          Assessment and Plan:   This is a  89 year old female admitted for pain control of fractures of the right fourth and fifth rib after a mechanical fall.    1.  Rib fractures.  Will write for Percocet for pain control with IV Dilaudid for breakthrough pain.  We will have the patient ambulate 4 times daily and have her assessed by physical therapy for possible TCU placement.  The patient states that she normally walks with a walker, but was not using one at the time of the fall.  The case was discussed with trauma surgeon who did not feel that the patient needed to be seen at this time.    2.  Bilateral lower extremity edema.  Will order echocardiogram to assess lower extremity edema.  For now continue Bumex for diuresis.  The patient is fully anticoagulated on Coumadin making DVT less likely.    3.  Hypothyroidism.  Check TSH.  Continue levothyroxine.    4.  Atrial fibrillation.  Patient is rate controlled on metoprolol.  Continue Coumadin for anticoagulation.    5.  Thrombocytosis.  Continue hydroxyurea.         Attestation:  This patient has been seen and evaluated by me, Eduin Cabrera MD.           Chief Complaint:   This patient is a 89 year old female who presents with right-sided chest pain after a fall.    History is obtained from the patient         History of Present Illness:   The patient states that she was at her daughter's today she was walking across the room when she slipped and fell, landing on her right side.  She was unable to get up and her daughter was unable to help her to get up due to the pain.  Ambulance was called and patient was transferred to [facility].  Patient denies any loss of consciousness she denies hitting her head.  Patient states that she has been eating and drinking  normally.  She denies any fevers or chills.  Patient denies any vomiting or diarrhea.  She denies dysuria.  Patient states that her lower extremity edema has been stable.  Patient denies any chest pain pressure or palpitations.             Past Medical History:     Past Medical History:   Diagnosis Date     Anemia      Atrial fibrillation, new onset (H) 8/25/13     Breast cancer (H) 1997     Chronic pain      DVT (deep venous thrombosis) (H)      Hyperlipaemia      Hypertension      Hypothyroidism      Mixed hyperlipidemia      Thrombocytosis (H)              Past Surgical History:     Past Surgical History:   Procedure Laterality Date     JOINT REPLACEMENT  7/23/2009    left total knee replacement     KYPHOPLASTY       Igor Nitinol Filter               Social History:     Social History   Substance Use Topics     Smoking status: Never Smoker     Smokeless tobacco: Not on file     Alcohol use No             Family History:   No family history on file.          Immunizations:     Immunization History   Administered Date(s) Administered     Influenza (IIV3) PF 10/17/2012     Pneumococcal 23 valent 11/04/2008     TD (ADULT, 7+) 04/12/2013     Tdap (Adacel,Boostrix) 04/12/2013            Allergies:     Allergies   Allergen Reactions     Ciprofloxacin Hives     Vicodin [Hydrocodone-Acetaminophen] Rash             Medications:     Prescriptions Prior to Admission   Medication Sig Dispense Refill Last Dose     LEVOTHYROXINE SODIUM PO Take 50 mcg by mouth daily   12/15/2017 at am     polyethylene glycol (MIRALAX/GLYCOLAX) Packet Take 1 packet by mouth daily as needed for constipation   prn     Potassium Chloride Magdalene ER (K-DUR PO) Take 20 mEq by mouth daily   12/15/2017 at am     Bumetanide (BUMEX PO) Take 4 mg by mouth daily    12/15/2017 at am     warfarin (COUMADIN) 2 MG tablet Take 1 mg by mouth daily  30 tablet 0 12/15/2017 at pm     SIMVASTATIN PO Take 20 mg by mouth At Bedtime   12/15/2017 at hs     nystatin  (MYCOSTATIN) 412555 UNIT/GM POWD Apply topically 2 times daily Under breasts   12/15/2017 at pm     metoprolol (TOPROL XL) 50 MG 24 hr tablet Take 50 mg by mouth 2 times daily   12/15/2017 at pm     Calcium Carbonate-Vitamin D (CALTRATE 600+D PO) Take 1 tablet by mouth daily    12/15/2017 at am     CHOLECALCIFEROL Take 1,000 Units by mouth daily   12/15/2017 at am     hydroxyurea (HYDREA) 500 MG capsule Take 1,000 mg by mouth daily    12/15/2017 at am             Review of Systems:   The 10 point Review of Systems is negative other than noted in the HPI           Physical Exam:   Vitals were reviewed  Temp: 97.4  F (36.3  C) Temp src: Axillary BP: 115/55   Heart Rate: 57 Resp: 20 SpO2: 94 % O2 Device: None (Room air) Oxygen Delivery: 2 LPM    This is a thin debilitated kyphotic elderly female sitting in bed, appears uncomfortable.  Head: atraumatic bitemporal, sclera noninjected and anicterric, oral mucosa moist without lesions or exudates.  Neck: supple without spinal abnormality  Chest: clear to auscultation bilaterally, without wheezes, rhonchi, or rales.  Cardiovascular: Irregular rate and rhythm, no gallops or rubs, 2/6 ALE, 3 + BLE edema to sacrum  Abdomen: bowel sounds normal, nontender and nondistended, no hepatosplenomegaly or masses.  Musculoskeletal: normal muscle mass and tone  Skin: no rashes, excoriations on shoulders  Lymph: no lymphadenopathy.  Neuro: cranial nerves II-XII intact, strength in a extremities normal, reflexes normal, coordination normal.         Data:   All laboratory data reviewed  All cardiac studies reviewed by me.  All imaging studies reviewed by me.

## 2017-12-16 NOTE — IP AVS SNAPSHOT
MRN:[id number]                      After Visit Summary   12/16/2017    Antoinette Cowan    MRN: [id number]           Thank you!     Thank you for choosing [facility] for your care. Our goal is always to provide you with excellent care. Hearing back from our patients is one way we can continue to improve our services. Please take a few minutes to complete the written survey that you may receive in the mail after you visit with us. Thank you!        Patient Information     Date Of Birth          4/3/1928        Designated Caregiver       Most Recent Value    Caregiver    Will someone help with your care after discharge? yes    Name of designated caregiver pta care facility     Phone number of caregiver NA    Caregiver address NA      About your hospital stay     You were admitted on:  December 16, 2017 You last received care in the:  [place] [address] [facility]    You were discharged on:  December 18, 2017       Who to Call     For medical emergencies, please call 911.  For non-urgent questions about your medical care, please call your primary care provider or clinic, [phone]          Attending Provider     Provider Specialty    Greyson Disla MD Emergency Medicine    [facility]       Primary Care Provider Office Phone # Fax #    Linda Park [phone]      After Care Instructions     Activity       Your activity upon discharge: activity as tolerated and no driving for today            Diet       Follow this diet upon discharge: Orders Placed This Encounter      Room Service      Combination Diet Low Saturated Fat Na <2400mg Diet            Oxygen Adult       [place] Oxygen Order 2 liter(s) by nasal cannula continuously with use of portable tank. Expected treatment length is 1 months.. Test on conserving device as applicable.    Patients who qualify for home O2 coverage under the CMS guidelines require ABG tests or O2 sat  readings obtained closest to, but no earlier than 2 days prior to the discharge, as evidence of the need for home oxygen therapy. Testing must be performed while patient is in the chronic stable state. See notes for O2 sats.    I certify that this patient, Antoinette Cowan has been under my care and that I, or a nurse practitioner or physician's assistant working with me, had a face-to-face encounter that meets the face-to-face encounter requirements with this patient on 12/18/2017. The patient, Antoinette Cowan was evaluated or treated in whole, or in part, for the following medical condition, which necessitates the use of the ordered oxygen. Treatment Diagnosis: Hypoxia    Attending Provider: Lamont Singh  Physician signature: See electronic signature associated with these discharge orders  Date of Order: December 18, 2017                  Follow-up Appointments     Follow-up and recommended labs and tests        Follow up with primary care provider, Linda Park, within 7 days for hospital follow- up.  No follow up labs or test are needed.  Consider follow up with cardiology.  The patient was provided with a limited supply of oral narcotic medication, as it was indicated by their medical condition.  The patient was instructed not to take the pain medication above the prescribed dose and frequency due to the potential for addiction, respiratory depression, and even death. The patient expressed understanding of these instructions and verbally contracted not to misuse the medication.                  Additional Services     Home Care OT Referral for Hospital Discharge       OT to eval and treat    Your provider has ordered home care - occupational therapy. If you have not been contacted within 2 days of your discharge please call the department phone number listed on the top of this document.            Home Care PT Referral for Hospital Discharge       PT to eval and treat    Your provider has ordered home  "care - physical therapy. If you have not been contacted within 2 days of your discharge please call the department phone number listed on the top of this document.            Home care nursing referral       RN extended hours visit. RN to assess respiratory and cardiac status, pain level and activity tolerance and home safety.  RN to teach medication management.  RN to provide home assistance.    Your provider has ordered home care nursing services. If you have not been contacted within 2 days of your discharge please call the inpatient department phone number at [phone] .                  Pending Results     No orders found from 2017 to 2017.            Statement of Approval     Ordered          17 0938  I have reviewed and agree with all the recommendations and orders detailed in this document.  EFFECTIVE NOW     Approved and electronically signed by:  Lamont Singh DO             Admission Information     Date & Time Provider Department Dept. Phone    2017 Eduin Cabrera MD [place] [address] [facility] [phone]      Your Vitals Were     Blood Pressure Pulse Temperature Respirations Weight Pulse Oximetry    119/61 75 97.3  F (36.3  C) (Oral) 18 81.6 kg (180 lb) 97%    BMI (Body Mass Index)                   30.9 kg/m2           MyChart Information     [employer] lets you send messages to your doctor, view your test results, renew your prescriptions, schedule appointments and more. To sign up, go to [email or web address] . Click on \"Log in\" on the left side of the screen, which will take you to the Welcome page. Then click on \"Sign up Now\" on the right side of the page.     You will be asked to enter the access code listed below, as well as some personal information. Please follow the directions to create your username and password.     Your access code is: [email or web address]  Expires: 3/18/2018 10:23 AM     Your access code will  in 90 days. If you need " help or a new code, please call your [facility] clinic or [phone].        Care EveryWhere ID     This is your Care EveryWhere ID. This could be used by other organizations to access your [facility] medical records  [id number]        Equal Access to Services     GIOVANNY SOMMER : Hadii aad ku hadcarriemaxi Yin, wafrankyda luqadaha, qaraissata kaalmada jaquan, madhuri sergio tommylaila cheungdamaris edwardslyle sharma. [facility] [phone].    ATENCIÓN: Si habla español, tiene a nogueira disposición servicios gratuitos de asistencia lingüística. Llame al [phone].    We comply with applicable federal civil rights laws and [place] laws. We do not discriminate on the basis of race, color, national origin, age, disability, sex, sexual orientation, or gender identity.               Review of your medicines      START taking        Dose / Directions    acetaminophen 500 MG tablet   Commonly known as:  TYLENOL   Used for:  Closed fracture of multiple ribs of right side, initial encounter   Notes to Patient:  6AM, 2PM, 10PM        Dose:  1000 mg   Take 2 tablets (1,000 mg) by mouth every 8 hours   Quantity:  40 tablet   Refills:  0       lidocaine 5 % Patch   Commonly known as:  LIDODERM   Used for:  Closed fracture of multiple ribs of right side, initial encounter        Apply up to 3 patches to painful area at once for up to 12 h within a 24 h period.  Remove after 12 hours.   Quantity:  30 patch   Refills:  1       ondansetron 4 MG ODT tab   Commonly known as:  ZOFRAN ODT   Used for:  Closed fracture of multiple ribs of right side, initial encounter        Dose:  4-8 mg   Take 1-2 tablets (4-8 mg) by mouth 3 times daily (before meals)   Quantity:  20 tablet   Refills:  1       oxyCODONE IR 5 MG tablet   Commonly known as:  ROXICODONE   Used for:  Closed fracture of multiple ribs of right side, initial encounter        Dose:  5 mg   Take 1 tablet (5 mg) by mouth every 4 hours as needed for moderate to severe pain   Quantity:  30 tablet    Refills:  0       senna-docusate 8.6-50 MG per tablet   Commonly known as:  SENOKOT-S;PERICOLACE   Used for:  Closed fracture of multiple ribs of right side, initial encounter        Dose:  1 tablet   Take 1 tablet by mouth 2 times daily as needed for constipation   Quantity:  30 tablet   Refills:  1         CONTINUE these medicines which have NOT CHANGED        Dose / Directions    BUMEX PO        Dose:  4 mg   Take 4 mg by mouth daily   Refills:  0       CALTRATE 600+D PO        Dose:  1 tablet   Take 1 tablet by mouth daily   Refills:  0       CHOLECALCIFEROL        Dose:  1000 Units   Take 1,000 Units by mouth daily   Refills:  0       COUMADIN 2 MG tablet   Generic drug:  warfarin        Dose:  1 mg   Take 1 mg by mouth daily   Quantity:  30 tablet   Refills:  0       HYDREA 500 MG capsule CHEMO   Generic drug:  hydroxyurea        Dose:  1000 mg   Take 1,000 mg by mouth daily   Refills:  0       K-DUR PO        Dose:  20 mEq   Take 20 mEq by mouth daily   Refills:  0       LEVOTHYROXINE SODIUM PO        Dose:  50 mcg   Take 50 mcg by mouth daily   Refills:  0       nystatin 757625 UNIT/GM Powd   Commonly known as:  MYCOSTATIN        Apply topically 2 times daily Under breasts   Refills:  0       polyethylene glycol Packet   Commonly known as:  MIRALAX/GLYCOLAX        Dose:  1 packet   Take 1 packet by mouth daily as needed for constipation   Refills:  0       SIMVASTATIN PO        Dose:  20 mg   Take 20 mg by mouth At Bedtime   Refills:  0       TOPROL XL 50 MG 24 hr tablet   Generic drug:  metoprolol        Dose:  50 mg   Take 50 mg by mouth 2 times daily   Refills:  0            Where to get your medicines      These medications were sent to [facility] - [address]  [address] [facility] [address], [place] [address]     Phone:  [phone]     acetaminophen 500 MG tablet    lidocaine 5 % Patch    ondansetron 4 MG ODT tab    senna-docusate 8.6-50 MG per tablet         Some of  these will need a paper prescription and others can be bought over the counter. Ask your nurse if you have questions.     Bring a paper prescription for each of these medications     oxyCODONE IR 5 MG tablet                Protect others around you: Learn how to safely use, store and throw away your medicines at www.disposemymeds.org.             Medication List: This is a list of all your medications and when to take them. Check marks below indicate your daily home schedule. Keep this list as a reference.      Medications           Morning Afternoon Evening Bedtime As Needed    acetaminophen 500 MG tablet   Commonly known as:  TYLENOL   Take 2 tablets (1,000 mg) by mouth every 8 hours   Last time this was given:  1,000 mg on 12/18/2017  6:41 AM   Next Dose Due:  12/18/17 2PM   Notes to Patient:  6AM, 2PM, 10PM            12/19/17 6AM       12/18/17 2PM       12/18/17 10PM               BUMEX PO   Take 4 mg by mouth daily   Last time this was given:  4 mg on 12/18/2017  8:48 AM   Next Dose Due:  12/19/17 AM            12/19/17                       CALTRATE 600+D PO   Take 1 tablet by mouth daily   Next Dose Due:  12/19/17 AM            12/19/17                       CHOLECALCIFEROL   Take 1,000 Units by mouth daily   Next Dose Due:  12/19/17 AM         12/19/17                       COUMADIN 2 MG tablet   Take 1 mg by mouth daily   Last time this was given:  1 mg on 12/17/2017  5:48 PM   Generic drug:  warfarin   Next Dose Due:  12/18/17 5PM                    12/18/17               HYDREA 500 MG capsule CHEMO   Take 1,000 mg by mouth daily   Last time this was given:  1,000 mg on 12/18/2017  9:12 AM   Generic drug:  hydroxyurea   Next Dose Due:  12/19/17 AM            12/19/17                       K-DUR PO   Take 20 mEq by mouth daily   Last time this was given:  20 mEq on 12/18/2017  8:48 AM   Next Dose Due:  12/19/17 AM            12/19/17                       LEVOTHYROXINE SODIUM PO   Take 50 mcg by mouth  daily   Last time this was given:  75 mcg on 12/18/2017  8:48 AM   Next Dose Due:  12/19/17 AM            12/19/17                       lidocaine 5 % Patch   Commonly known as:  LIDODERM   Apply up to 3 patches to painful area at once for up to 12 h within a 24 h period.  Remove after 12 hours.   Next Dose Due:  Remove at 8PM; Place 12/19/17 8AM            Place 12/19/17           Remove 12/18/17               nystatin [id number] UNIT/GM Powd   Commonly known as:  MYCOSTATIN   Apply topically 2 times daily Under breasts   Next Dose Due:  Resume                                ondansetron 4 MG ODT tab   Commonly known as:  ZOFRAN ODT   Take 1-2 tablets (4-8 mg) by mouth 3 times daily (before meals)   Last time this was given:  4 mg on 12/18/2017 11:16 AM                                oxyCODONE IR 5 MG tablet   Commonly known as:  ROXICODONE   Take 1 tablet (5 mg) by mouth every 4 hours as needed for moderate to severe pain   Last time this was given:  5 mg on 12/18/2017  8:48 AM   Next Dose Due:  Available as needed after 11:45 AM                                polyethylene glycol Packet   Commonly known as:  MIRALAX/GLYCOLAX   Take 1 packet by mouth daily as needed for constipation   Next Dose Due:  Available as needed                                   senna-docusate 8.6-50 MG per tablet   Commonly known as:  SENOKOT-S;PERICOLACE   Take 1 tablet by mouth 2 times daily as needed for constipation   Next Dose Due:  Available as needed                                   SIMVASTATIN PO   Take 20 mg by mouth At Bedtime   Last time this was given:  20 mg on 12/17/2017 10:03 PM   Next Dose Due:  12/18/17 Bedtime                        12/18/17           TOPROL XL 50 MG 24 hr tablet   Take 50 mg by mouth 2 times daily   Last time this was given:  50 mg on 12/17/2017 10:07 PM   Generic drug:  metoprolol   Next Dose Due:  12/18/17 PM            12/19/17 12/18/17

## 2017-12-16 NOTE — PHARMACY-ANTICOAGULATION SERVICE
Clinical Pharmacy - Warfarin Dosing Consult     Pharmacy has been consulted to manage this patient s warfarin therapy.  Indication: Atrial Fibrillation  Therapy Goal: INR 2-3  Warfarin Prior to Admission: Yes  Warfarin PTA Regimen: 1mg daily    INR   Date Value Ref Range Status   12/16/2017 2.20 (H) 0.86 - 1.14 Final   09/12/2017 3.42 (H) 0.86 - 1.14 Final       Recommend warfarin 1 mg today.  Pharmacy will monitor Antoinette Cowan daily and order warfarin doses to achieve specified goal.      Please contact pharmacy as soon as possible if the warfarin needs to be held for a procedure or if the warfarin goals change.

## 2017-12-16 NOTE — ED PROVIDER NOTES
History     Chief Complaint:  Fall    HPI   Of note, this patient's history is limited secondary to the patient's history of dementia. The patient's daughter was not present at the time of the fall.     Antoinette Cowan is a 89 year old female with a history of breast cancer, dementia, DVT, HTN, Atrial fibrillation, and hypothyroidism who presents to the emergency department by EMS for evaluation of a fall. Of note, the patient currently is on blood thinners. Earlier this morning, the patient was found on the floor of the bathroom after presumably having an unwitnessed fall in her assisted living facility. The patient's daughter is unsure if the patient hit her head but states the patient was found tangled in her walker. This fall prompted the patient to seek evaluation here in the emergency department. Here, the patient states she vaguely remembers the fall, stating she had just finished using the toilet and was standing when she fell. She reports pain to the right side of her chest secondary to the fall.  Her daughter later expressed concern for new neck pain as well.  She denies having any other new symptoms today.    Allergies:  Ciprofloxacin  Vicodine     Medications:    furosemide  warfarin   SIMVASTATIN  nystatin   metoprolol   cyanocobalamin   hydroxyurea   Levothyroxine      Past Medical History:    Anemia   Atrial fibrillation  Breast cancer    Chronic pain   DVT    Hyperlipidemia   Hypertension   Hypothyroidism   Mixed hyperlipidemia   Thrombocytosis      Past Surgical History:    Left total knee replacement  Kyphoplasty  Igor Nitinol filter    Family History:    No past pertinent family history.    Social History:  Negative for tobacco use.  Negative for alcohol use.  Marital Status:        Review of Systems   All other systems reviewed and are negative.    Physical Exam   First Vitals:  BP: 123/66  Heart Rate: 57  Temp: 97.4  F (36.3  C)  Resp: 16  Weight: 81.6 kg (180 lb)  SpO2: (!) 87  %      Physical Exam  General: woman sitting upright, daughter later at bedside  HENT: face nontender with full painless ROM mandible, no bony deformity, OP clear, no difficulty controlling secretions, skull nontender  Eyes: PERRL without proptosis  CV: mildly bradycardic, slightly irregular rhythm, cap refill normal in all extremities, bilateral lower leg edema (chronic)  Resp: clear but reluctant to take deep breaths due to right chest pain  GI: abdomen soft,  nontender, no guarding  MSK:  Cervical spine: mild diffuse tenderness to lower posterior neck, FROM   Thoracic spine: no midline tenderness, no CVAT  Lumbar spine: no midline tenderness  Chest wall: tender to R lateral chest without crepitus, s/p mastectomy  Pelvis stable  Extremities: no focal tenderness  Skin:   Very small abrasion to posterior R upper arm without active bleeding  No ecchymosis  No laceration  Neuro: awake, alert, poor historian but follows basic commands, moves all extremities but too weak to lift legs very far off bed (baseline per daughter), unable to test gait, GCS 14  Psych: cooperative, calm      Emergency Department Course   ECG:  Indication: fall  Time: 0858  Vent. Rate 54 bpm. KY interval *. QRS duration 116. QT/QTc 508/481. P-R-T axis * -64 -9. Atrial fibrillation with slow ventricular response. Left axis deviation. Incomplete left bundle branch block. Abnormal ECG. Read time: 0915.    Imaging:  Radiographic findings were communicated with the patient who voiced understanding of the findings.    XR Ribs, unilat 3 views + PA chest:  1. Deformity of the right fourth and fifth ribs laterally which could  be due to old trauma if this is not the area of current clinical  concern. The right lower ribs appear intact.  2. Cardiomegaly with hiatal hernia and pulmonary vascular congestion,  unchanged in the interval. As per radiology.     CT Head without contrast:   1. Atrophy and chronic white matter disease.  2. Nothing acute.  As per  radiology.    CT Cervical Spine w/o contrast:  1. Multilevel degenerative change.  2. No fracture or acute abnormality. As per radiology.       Laboratory:  CBC: WBC: 5.4, HGB: 11.8, PLT: 187  BMP: Glucose 163 (H), Creatinine 1.20 (H), Carbon Dioxide 34 (H), GFR 42 (L), o/w WNL  INR: 2.20 (H)    Interventions:  0956  Fentanyl 25 mcg IV    Emergency Department Course:  0858 Nursing notes and vitals reviewed.  I performed an exam of the patient as documented above.     IV inserted. Medicine administered as documented above. Blood drawn. This was sent to the lab for further testing, results above.    EKG obtained in the ED, see results above.     The patient was placed on continuous pulse oximetry and cardiac monitoring while here in the ED.      The patient was sent for a ribs and chest xray while in the emergency department, findings above.     1028 I rechecked the patient and discussed the results of her workup thus far.     1102  I consulted with Dr. Cabrera of the hospitalist services. They are in agreement to accept the patient for admission.    Findings and plan explained to the Patient and daughter who consents to admission. Discussed the patient with Dr. Cabrera, who will admit the patient to a Observation bed for further monitoring, evaluation, and treatment.  Impression & Plan    Medical Decision Making:  Antoinette Cowan is a pleasant 89 year old female presents after being found down after presumably suffering a mechanical fall, though the exact etiology is unclear. From a possible medical precipitant standpoint, she is mildly bradycardiac though I do not think this is likely what caused her to fall. Screening and laboratory studies are benign. I think her mild hypoxia is due to splinting due to her right sided chest wall injury, perhaps with some degree of pulmonary contusion though this is not seen definitively on xray. I do not think she needs CT chest at this time. She is appropriately anticoagulated  and has an IVC filter in place, making PE much less likely.  Given her ongoing pain, I think she would benefit from hospitalization for monitoring, pain control, and pulmonary toilet as she is at increased risk for pulmonary and other complications from this fall. Alternate injuries were considered but are not highly suspected nor detected based on her current exam and imaging. Dr. Cabrera of the hospitalist services accepts care and can coordinate trauma consultation on a non-emergent basis, which was discussed.       Diagnosis:    ICD-10-CM    1. Closed fracture of multiple ribs of right side, initial encounter S22.41XA    2. Long term current use of anticoagulant therapy Z79.01    3. Fall, initial encounter W19.XXXA        Disposition:  Admitted to Dr. Cabrera    I, Kalani Weaver, am serving as a scribe on 12/16/2017 at 8:49 AM to personally document services performed by Greyson Disla, * based on my observations and the provider's statements to me.     Kalani Weaver  12/16/2017    EMERGENCY DEPARTMENT       Greyson Disla MD  12/16/17 9130

## 2017-12-16 NOTE — ED NOTES
Bed: ED30  Expected date:   Expected time:   Means of arrival:   Comments:  Samantha Mancilla F fall eta now

## 2017-12-16 NOTE — PHARMACY-ADMISSION MEDICATION HISTORY
Admission medication history interview status for the 12/16/2017  admission is complete. See EPIC admission navigator for prior to admission medications     Medication history source reliability:Good    Actions taken by pharmacist (provider contacted, etc): information taken from daughter and [facility].     Additional medication history information not noted on PTA med list : Pt lives in assisted living facility but daughter sets up medications.  Medication list sent from [facility] was NOT accurate.      Medication reconciliation/reorder completed by provider prior to medication history? No    Time spent in this activity: 25 min    Prior to Admission medications    Medication Sig Last Dose Taking? Auth Provider   LEVOTHYROXINE SODIUM PO Take 50 mcg by mouth daily 12/15/2017 at am Yes Unknown, Entered By History   polyethylene glycol (MIRALAX/GLYCOLAX) Packet Take 1 packet by mouth daily as needed for constipation prn Yes Unknown, Entered By History   Potassium Chloride Magdalene ER (K-DUR PO) Take 20 mEq by mouth daily 12/15/2017 at am Yes Unknown, Entered By History   Bumetanide (BUMEX PO) Take 4 mg by mouth daily  12/15/2017 at am Yes Unknown, Entered By History   warfarin (COUMADIN) 2 MG tablet Take 1 mg by mouth daily  12/15/2017 at pm Yes Fela Clark MD   SIMVASTATIN PO Take 20 mg by mouth At Bedtime 12/15/2017 at hs Yes Unknown, Entered By History   nystatin (MYCOSTATIN) 000183 UNIT/GM POWD Apply topically 2 times daily Under breasts 12/15/2017 at pm Yes Unknown, Entered By History   metoprolol (TOPROL XL) 50 MG 24 hr tablet Take 50 mg by mouth 2 times daily 12/15/2017 at pm Yes Reported, Patient   Calcium Carbonate-Vitamin D (CALTRATE 600+D PO) Take 1 tablet by mouth daily  12/15/2017 at am Yes Reported, Patient   CHOLECALCIFEROL Take 1,000 Units by mouth daily 12/15/2017 at am Yes Reported, Patient   hydroxyurea (HYDREA) 500 MG capsule Take 1,000 mg by mouth daily  12/15/2017 at am Yes Reported,  Patient

## 2017-12-16 NOTE — IP AVS SNAPSHOT
[address] [facility]    [address] [place] [address]    Phone:  [phone]                                       After Visit Summary   12/16/2017    Antoinette Cowan    MRN: [id number]           After Visit Summary Signature Page     I have received my discharge instructions, and my questions have been answered. I have discussed any challenges I see with this plan with the nurse or doctor.    ..........................................................................................................................................  Patient/Patient Representative Signature      ..........................................................................................................................................  Patient Representative Print Name and Relationship to Patient    ..................................................               ................................................  Date                                            Time    ..........................................................................................................................................  Reviewed by Signature/Title    ...................................................              ..............................................  Date                                                            Time

## 2017-12-16 NOTE — LETTER
Patient:  Antoinette Cowan  :   4/3/1928        2017    Patient Name:  Antoinette Cowan    To whom it may concern,    Dianna Cowan needed to change her flight and take care of her mother at the hospital and provide aftercare services.       Sincerely,       Lamont Singh DO MPH

## 2017-12-16 NOTE — ED NOTES
"[facility]  ED Nurse Handoff Report    ED Chief complaint: Fall (pt from assisted living - pt had a fall this morning was found in bathroom - pt complaining of pain \"all over\" grabbing at her right chest area when asked - paramedics thinks pt's neuro's at baseline )      ED Diagnosis:   Final diagnoses:   Closed fracture of multiple ribs of right side, initial encounter   Long term current use of anticoagulant therapy   Fall, initial encounter       Code Status: Full Code    Allergies:   Allergies   Allergen Reactions     Ciprofloxacin Hives     Vicodin [Hydrocodone-Acetaminophen] Rash       Activity level - Baseline/Home:  Stand with Assist    Activity Level - Current:   Total Care     Needed?: No    Isolation: No  Infection: Not Applicable    Bariatric?: No    Vital Signs:   Vitals:    12/16/17 0827 12/16/17 0900 12/16/17 1003 12/16/17 1031   BP:    112/57   Resp:  25 10 14   Temp:       TempSrc:       SpO2: 97% 90% 96% 98%   Weight:           Cardiac Rhythm: ,   Atrial fib    Pain level: 0-10 Pain Scale: 5    Is this patient confused?: Yes: patient has baseline dementia but does typically function with minimal assistance in an apartment. Not impulsive. Able to use call light.     Patient Report: Initial Complaint: Patient fell in bathroom at AL today. Had right sided rib/chest wall pain.   Focused Assessment:   Neuro: Patient at baseline. Uses walker to get around short distances.   Cards: Afib Hr 40s-50s.   Pulm: placed on 2L NC spo2 95-98%.   GI: NPo in ER  : Uses bathroom at home   Skin: Fragile/ bruising   Tests Performed: EKG, Labs, Xray, CT   Abnormal Results: Xray  Treatments provided: Pain medications    Family Comments: Daughter at bedside.     OBS brochure/video discussed/provided to patient: yes    ED Medications:   Medications   fentaNYL (PF) (SUBLIMAZE) injection 25 mcg (25 mcg Intravenous Given 12/16/17 0956)       Drips infusing?:  No      ED NURSE PHONE NUMBER: " [phone] Nikunj

## 2017-12-17 ENCOUNTER — APPOINTMENT (OUTPATIENT)
Dept: CARDIOLOGY | Facility: CLINIC | Age: 82
End: 2017-12-17
Attending: INTERNAL MEDICINE
Payer: COMMERCIAL

## 2017-12-17 ENCOUNTER — APPOINTMENT (OUTPATIENT)
Dept: PHYSICAL THERAPY | Facility: CLINIC | Age: 82
End: 2017-12-17
Attending: INTERNAL MEDICINE
Payer: COMMERCIAL

## 2017-12-17 LAB
ANION GAP SERPL CALCULATED.3IONS-SCNC: 6 MMOL/L (ref 3–14)
BUN SERPL-MCNC: 26 MG/DL (ref 7–30)
CALCIUM SERPL-MCNC: 8.9 MG/DL (ref 8.5–10.1)
CHLORIDE SERPL-SCNC: 104 MMOL/L (ref 94–109)
CO2 SERPL-SCNC: 30 MMOL/L (ref 20–32)
CREAT SERPL-MCNC: 1.03 MG/DL (ref 0.52–1.04)
ERYTHROCYTE [DISTWIDTH] IN BLOOD BY AUTOMATED COUNT: 15.1 % (ref 10–15)
GFR SERPL CREATININE-BSD FRML MDRD: 50 ML/MIN/1.7M2
GLUCOSE SERPL-MCNC: 125 MG/DL (ref 70–99)
HCT VFR BLD AUTO: 32.2 % (ref 35–47)
HGB BLD-MCNC: 10.4 G/DL (ref 11.7–15.7)
INR PPP: 2.23 (ref 0.86–1.14)
MCH RBC QN AUTO: 39.4 PG (ref 26.5–33)
MCHC RBC AUTO-ENTMCNC: 32.3 G/DL (ref 31.5–36.5)
MCV RBC AUTO: 122 FL (ref 78–100)
PLATELET # BLD AUTO: 156 10E9/L (ref 150–450)
POTASSIUM SERPL-SCNC: 3.9 MMOL/L (ref 3.4–5.3)
RBC # BLD AUTO: 2.64 10E12/L (ref 3.8–5.2)
SODIUM SERPL-SCNC: 140 MMOL/L (ref 133–144)
WBC # BLD AUTO: 5.5 10E9/L (ref 4–11)

## 2017-12-17 PROCEDURE — 80048 BASIC METABOLIC PNL TOTAL CA: CPT | Performed by: INTERNAL MEDICINE

## 2017-12-17 PROCEDURE — 99207 ZZC CDG-CODE CATEGORY CHANGED: CPT | Performed by: INTERNAL MEDICINE

## 2017-12-17 PROCEDURE — 99225 ZZC SUBSEQUENT OBSERVATION CARE,LEVEL II: CPT | Performed by: INTERNAL MEDICINE

## 2017-12-17 PROCEDURE — G0378 HOSPITAL OBSERVATION PER HR: HCPCS

## 2017-12-17 PROCEDURE — 85027 COMPLETE CBC AUTOMATED: CPT | Performed by: INTERNAL MEDICINE

## 2017-12-17 PROCEDURE — 36415 COLL VENOUS BLD VENIPUNCTURE: CPT | Performed by: INTERNAL MEDICINE

## 2017-12-17 PROCEDURE — 93306 TTE W/DOPPLER COMPLETE: CPT | Mod: 26 | Performed by: INTERNAL MEDICINE

## 2017-12-17 PROCEDURE — G8980 MOBILITY D/C STATUS: HCPCS | Mod: GP,CM

## 2017-12-17 PROCEDURE — 97530 THERAPEUTIC ACTIVITIES: CPT | Mod: GP

## 2017-12-17 PROCEDURE — 25000132 ZZH RX MED GY IP 250 OP 250 PS 637

## 2017-12-17 PROCEDURE — 93306 TTE W/DOPPLER COMPLETE: CPT

## 2017-12-17 PROCEDURE — G8978 MOBILITY CURRENT STATUS: HCPCS | Mod: GP,CM

## 2017-12-17 PROCEDURE — 40000193 ZZH STATISTIC PT WARD VISIT

## 2017-12-17 PROCEDURE — 85610 PROTHROMBIN TIME: CPT | Performed by: INTERNAL MEDICINE

## 2017-12-17 PROCEDURE — 97161 PT EVAL LOW COMPLEX 20 MIN: CPT | Mod: GP

## 2017-12-17 PROCEDURE — G8979 MOBILITY GOAL STATUS: HCPCS | Mod: GP,CM

## 2017-12-17 PROCEDURE — 25000132 ZZH RX MED GY IP 250 OP 250 PS 637: Performed by: INTERNAL MEDICINE

## 2017-12-17 RX ORDER — OXYCODONE HYDROCHLORIDE 5 MG/1
5-10 TABLET ORAL
Status: DISCONTINUED | OUTPATIENT
Start: 2017-12-17 | End: 2017-12-18 | Stop reason: HOSPADM

## 2017-12-17 RX ORDER — WARFARIN SODIUM 1 MG/1
1 TABLET ORAL
Status: COMPLETED | OUTPATIENT
Start: 2017-12-17 | End: 2017-12-17

## 2017-12-17 RX ORDER — ACETAMINOPHEN 500 MG
1000 TABLET ORAL EVERY 8 HOURS SCHEDULED
Status: DISCONTINUED | OUTPATIENT
Start: 2017-12-17 | End: 2017-12-18 | Stop reason: HOSPADM

## 2017-12-17 RX ADMIN — MICONAZOLE NITRATE: 2 POWDER TOPICAL at 09:45

## 2017-12-17 RX ADMIN — POTASSIUM CHLORIDE 20 MEQ: 1500 TABLET, EXTENDED RELEASE ORAL at 08:49

## 2017-12-17 RX ADMIN — ACETAMINOPHEN 1000 MG: 500 TABLET, FILM COATED ORAL at 14:11

## 2017-12-17 RX ADMIN — SIMVASTATIN 20 MG: 20 TABLET, FILM COATED ORAL at 22:03

## 2017-12-17 RX ADMIN — OXYCODONE HYDROCHLORIDE 5 MG: 5 TABLET ORAL at 17:48

## 2017-12-17 RX ADMIN — HYDROXYUREA 1000 MG: 500 CAPSULE ORAL at 08:50

## 2017-12-17 RX ADMIN — OXYCODONE HYDROCHLORIDE 5 MG: 5 TABLET ORAL at 14:11

## 2017-12-17 RX ADMIN — ACETAMINOPHEN 650 MG: 325 TABLET, FILM COATED ORAL at 08:49

## 2017-12-17 RX ADMIN — METOPROLOL SUCCINATE 50 MG: 50 TABLET, EXTENDED RELEASE ORAL at 08:49

## 2017-12-17 RX ADMIN — LEVOTHYROXINE SODIUM 75 MCG: 75 TABLET ORAL at 08:49

## 2017-12-17 RX ADMIN — WARFARIN SODIUM 1 MG: 1 TABLET ORAL at 17:48

## 2017-12-17 RX ADMIN — ACETAMINOPHEN 1000 MG: 500 TABLET, FILM COATED ORAL at 22:03

## 2017-12-17 RX ADMIN — METOPROLOL SUCCINATE 50 MG: 50 TABLET, EXTENDED RELEASE ORAL at 22:07

## 2017-12-17 RX ADMIN — MICONAZOLE NITRATE: 2 POWDER TOPICAL at 22:05

## 2017-12-17 RX ADMIN — BUMETANIDE 4 MG: 1 TABLET ORAL at 08:48

## 2017-12-17 RX ADMIN — OXYCODONE HYDROCHLORIDE 5 MG: 5 TABLET ORAL at 11:06

## 2017-12-17 NOTE — PLAN OF CARE
Problem: Patient Care Overview  Goal: Plan of Care/Patient Progress Review  Outcome: Improving  A/O x3. Forgetful. Repo Ax2 q2. incont of urine. VSS on RA. Denies pain at rest. CMS intact with BLE edema. Generalized bruising noted. Will cont to monitor.

## 2017-12-17 NOTE — PROGRESS NOTES
[facility]    Hospitalist Progress Note    Date of Service (when I saw the patient): 12/17/2017    Assessment & Plan   Antoinette Cowan is a 89 year old female who was admitted on 12/16/2017 for pain control of fractures of the right fourth and fifth rib after a mechanical fall.    1. Rib fracture - Continue Percocet for pain.  Encourage mobilization.    2. BLE edema - Echo demonstrates EF of 60-65% and mod-severe TR and NJ.  Continue Bumex.  Patient is therapeutic on Coumadin.    3.  Hypothyroidism.  Check TSH.  Continue levothyroxine.     4.  Atrial fibrillation.  Patient is rate controlled on metoprolol.  Continue Coumadin for anticoagulation.     5.  Thrombocytosis.  Continue hydroxyurea.         DVT Prophylaxis: Warfarin  Code Status: DNR/DNI    Disposition: Expected discharge in 1-2 days.   Anticipate discharge to [facility] with daughter.  Patient and family are declining TCU.    Eduin Cabrera  Text Page (7 am to 6 pm)    Interval History   The patient is an assist of 2 to get out of bed.  Pain at rest is controlled.    -Data reviewed today: I reviewed all new labs and imaging results over the last 24 hours. I personally reviewed no images or EKG's today.    Physical Exam   Temp: 97.3  F (36.3  C) Temp src: Axillary BP: 114/63 Pulse: 75 Heart Rate: 58 Resp: 16 SpO2: 90 % O2 Device: None (Room air)    Vitals:    12/16/17 0826   Weight: 81.6 kg (180 lb)     Vital Signs with Ranges  Temp:  [94.5  F (34.7  C)-97.6  F (36.4  C)] 97.3  F (36.3  C)  Pulse:  [75] 75  Heart Rate:  [47-80] 58  Resp:  [12-18] 16  BP: (114-118)/(59-63) 114/63  SpO2:  [90 %-93 %] 90 %  I/O last 3 completed shifts:  In: 300 [P.O.:300]  Out: 200 [Urine:200]    Gen: This is a thin debilitated kyphotic elderly female sitting in bed, no acute distressed  HEENT: Atraumatic, bitemporal wasting  Lungs: Clear to ausculation without wheezes, rhonchi, or rales  Heart: Irregular rate and rhythm, no gallops or rubs, 2/6 ALE  GI: Bowel  sound normal, no hepatosplenomegaly or masses  Lymph: No lymphadenopathy, 3 + BLE edema  Skin: No rashes     Medications     Warfarin Therapy Reminder         acetaminophen (TYLENOL) tablet 1,000 mg  1,000 mg Oral Q8H SY     warfarin  1 mg Oral ONCE at 18:00     bumetanide  4 mg Oral Daily     hydroxyurea  1,000 mg Oral Daily     metoprolol  50 mg Oral BID     miconazole   Topical BID     potassium chloride SA (K-DUR/KLOR-CON M) CR tablet 20 mEq  20 mEq Oral Daily     simvastatin (ZOCOR) tablet 20 mg  20 mg Oral At Bedtime     levothyroxine (SYNTHROID/LEVOTHROID) tablet 75 mcg  75 mcg Oral Daily       Data     Recent Labs  Lab 12/17/17  0620 12/16/17  0910   WBC 5.5 5.4   HGB 10.4* 11.8   * 124*    187   INR 2.23* 2.20*    142   POTASSIUM 3.9 4.5   CHLORIDE 104 103   CO2 30 34*   BUN 26 26   CR 1.03 1.20*   ANIONGAP 6 5   KACEY 8.9 9.5   * 163*       No results found for this or any previous visit (from the past 24 hour(s)).

## 2017-12-17 NOTE — PROGRESS NOTES
"Called patients daughter Yin [phone].  Explained both \"what is outpatient observation\" and COYLE for over 24 hours.  Yin understands patient is under observation at this time and is currently getting up with an assist of two.  She has services for RN at her [facility] however it is not 24 hours and one RN.  Explained to Yin that PT is scheduled to see her mom later this afternoon, but it is most likely that they are going to be recommending TCU.  Yin says they do not want her mom to go to TCU as she always cognitively declines she is in the process of getting a lift recliner today her sister will be coming into town this week for 2 weeks.  Explained that if they refuse TCU they would recommend 24 hour assistance and A2 that [place] would require A2.  Yin says that between her sister and herself and staff at [facility] they would be able to provide this assistance.  Patient would require Homecare as well for discharge.  Updated SW regarding the above details.  Left Obs/COYLE in the room for Yin to review.  She is aware to ask for CC or SW for further d/c questions and assistance.  "

## 2017-12-17 NOTE — PLAN OF CARE
Problem: Patient Care Overview  Goal: Plan of Care/Patient Progress Review  PT: PT orders received, evaluation completed, treatment initiated. Pt sustained multiple closed rib fractures from fall on 12/16/17. Pt lives alone in [place] and prior to fall, required 4WW and assist of 1 for mobility. Pt is impaired by pain, dec activity tolerance, and weakness which limits her ability in bed mobility, transfers, and gait.     Discharge Planner PT   Patient plan for discharge: Per daughter, return to [facility]. Daughter stated they can accommodate need for assist of 2.   Current status: Pt transferred supine <> sit with maxA x2, sit <> stand with Loraine x2 and FWW. Pt took 3 steps forward and 3 steps backward with Loraine x2 and FWW. Educated pt and daughter on log roll technique, use of FWW instead of 4WW, limit twisting at trunk, and importance of activity and mobility. Daughter will be providing 24 hour supervision at this time as staff is unable to provide 24/7 assist.  Barriers to return to prior living situation: level of assist needed, falls risk, weakness, pain  Recommendations for discharge: TCU  Rationale for recommendations: Pt requires assistance of 2 for all mobility. Pt would benefit from continued skilled PT services to inc endurance and strength in order improve independence and safety with mobility. If family refuses TCU,  PT is recommended. Pt would need to use a FWW instead of the 4WW. Daughter will locate a FWW. Goal for education of pt and daughter met. Will sign off.       Entered by: Erlinda Gaming 12/17/2017 2:19 PM

## 2017-12-17 NOTE — PLAN OF CARE
Problem: Patient Care Overview  Goal: Plan of Care/Patient Progress Review  Outcome: Improving  Pt remains A/O. Up with A2 walker GB. IV SL. Oxycodone and tylenol for pain. BLE edema. Plan to d/c tomorrow. Continue to monitor.

## 2017-12-17 NOTE — PLAN OF CARE
Problem: Pain, Acute (Adult)  Goal: Identify Related Risk Factors and Signs and Symptoms  Related risk factors and signs and symptoms are identified upon initiation of Human Response Clinical Practice Guideline (CPG).   Outcome: No Change  Patient up with strong assist of 2 and walker.  Given Tylenol for pain.  VSS on RA.  Alert to self only.  Calm and cooperative.  Reorientation provided.  Incontinent of urine occasionally.  PO fluid intake encouraged.  Bilateral lower extremities elevated on pillows.

## 2017-12-17 NOTE — PLAN OF CARE
"Pt oriented to self, pleasant, kept asking writer \"who else is here from accident,\" frequently asks where she is. Pt able to use call light. Pt incontinent, used bedside commode with assist of 2. Tylenol given for pain. Pt on low fat/low carb diet, poor intake, states she is not hungry. Bilateral edema +2/3. Echo ordered. Pt eval ordered. Continue to monitor.   "

## 2017-12-17 NOTE — PROGRESS NOTES
12/17/17 1300   Quick Adds   Type of Visit Initial PT Evaluation   Living Environment   Lives With alone   Living Arrangements assisted living   Home Accessibility no concerns   Number of Stairs to Enter Home 0   Number of Stairs Within Home 0   Self-Care   Usual Activity Tolerance fair   Current Activity Tolerance poor   Regular Exercise no   Equipment Currently Used at Home other (see comments)  (4WW)   Functional Level Prior   Ambulation 3-->assistive equipment and person   Transferring 3-->assistive equipment and person   Fall history within last six months yes   Number of times patient has fallen within last six months 1   General Information   Onset of Illness/Injury or Date of Surgery - Date 12/16/17   Referring Physician Eduin Cabrera MD   Patient/Family Goals Statement [facility]   Pertinent History of Current Problem (include personal factors and/or comorbidities that impact the POC) Pt sustained closed fracture of multiple ribs after fall. PMH dementia, CA, DVT, HTN, afib, hypothyroidism.   Precautions/Limitations fall precautions   Weight-Bearing Status - LUE full weight-bearing   Weight-Bearing Status - RUE full weight-bearing   Weight-Bearing Status - LLE full weight-bearing   Weight-Bearing Status - RLE full weight-bearing   Cognitive Status Examination   Orientation person   Level of Consciousness alert;confused   Follows Commands and Answers Questions able to follow single-step instructions   Personal Safety and Judgment impaired   Memory impaired   Pain Assessment   Patient Currently in Pain Yes, see Vital Sign flowsheet  (R sided chest pain, pt unable to rate)   Integumentary/Edema   Integumentary/Edema Comments B LE edema   Posture    Posture Protracted shoulders;Kyphosis   Range of Motion (ROM)   ROM Comment WFL   Strength   Strength Comments functionally weak   Bed Mobility   Bed Mobility Comments supine > sit maxA x2   Transfer Skills   Transfer Comments sit <> stand Loraine x2 and FWW  "  Gait   Gait Comments Able to take 3 steps forward, 3 step backward with Loraine x2 and FWW. Demonstrated extremely stooped posture.   Balance   Balance Comments unsteady in standing   Sensory Examination   Sensory Perception no deficits were identified   General Therapy Interventions   Planned Therapy Interventions bed mobility training;transfer training   Clinical Impression   Criteria for Skilled Therapeutic Intervention yes, treatment indicated   PT Diagnosis difficulty with transfers   Influenced by the following impairments pain, weakness, dec activity tolerance   Functional limitations due to impairments difficulty with bed mobility, transfers, and gait   Clinical Presentation Stable/Uncomplicated   Clinical Presentation Rationale medically stable   Clinical Decision Making (Complexity) Low complexity   Therapy Frequency` other (see comments)  (eval and 1 treat only)   Predicted Duration of Therapy Intervention (days/wks) 1 day   Anticipated Discharge Disposition Transitional Care Facility   Risk & Benefits of therapy have been explained Yes   Patient, Family & other staff in agreement with plan of care Yes   [facility] AM-PAC  \"6 Clicks\" V.2 Basic Mobility Inpatient Short Form   1. Turning from your back to your side while in a flat bed without using bedrails? 2 - A Lot   2. Moving from lying on your back to sitting on the side of a flat bed without using bedrails? 2 - A Lot   3. Moving to and from a bed to a chair (including a wheelchair)? 2 - A Lot   4. Standing up from a chair using your arms (e.g., wheelchair, or bedside chair)? 2 - A Lot   5. To walk in hospital room? 2 - A Lot   6. Climbing 3-5 steps with a railing? 1 - Total   Basic Mobility Raw Score (Score out of 24.Lower scores equate to lower levels of function) 11   Total Evaluation Time   Total Evaluation Time (Minutes) 10     "

## 2017-12-18 VITALS
RESPIRATION RATE: 18 BRPM | HEART RATE: 75 BPM | DIASTOLIC BLOOD PRESSURE: 61 MMHG | BODY MASS INDEX: 30.9 KG/M2 | WEIGHT: 180 LBS | TEMPERATURE: 97.3 F | OXYGEN SATURATION: 97 % | SYSTOLIC BLOOD PRESSURE: 119 MMHG

## 2017-12-18 LAB — INR PPP: 2.88 (ref 0.86–1.14)

## 2017-12-18 PROCEDURE — G0378 HOSPITAL OBSERVATION PER HR: HCPCS

## 2017-12-18 PROCEDURE — 99217 ZZC OBSERVATION CARE DISCHARGE: CPT | Performed by: HOSPITALIST

## 2017-12-18 PROCEDURE — 99207 ZZC CDG-CODE CATEGORY CHANGED: CPT | Performed by: HOSPITALIST

## 2017-12-18 PROCEDURE — 85610 PROTHROMBIN TIME: CPT | Performed by: INTERNAL MEDICINE

## 2017-12-18 PROCEDURE — 36415 COLL VENOUS BLD VENIPUNCTURE: CPT | Performed by: INTERNAL MEDICINE

## 2017-12-18 PROCEDURE — 25000132 ZZH RX MED GY IP 250 OP 250 PS 637: Performed by: INTERNAL MEDICINE

## 2017-12-18 PROCEDURE — 25000125 ZZHC RX 250: Performed by: INTERNAL MEDICINE

## 2017-12-18 RX ORDER — AMOXICILLIN 250 MG
1 CAPSULE ORAL 2 TIMES DAILY PRN
Qty: 30 TABLET | Refills: 1 | Status: ON HOLD | OUTPATIENT
Start: 2017-12-18 | End: 2018-02-17

## 2017-12-18 RX ORDER — ONDANSETRON 4 MG/1
4-8 TABLET, ORALLY DISINTEGRATING ORAL
Qty: 20 TABLET | Refills: 1 | Status: ON HOLD | OUTPATIENT
Start: 2017-12-18 | End: 2018-02-17

## 2017-12-18 RX ORDER — LIDOCAINE 50 MG/G
PATCH TOPICAL
Qty: 30 PATCH | Refills: 1 | Status: SHIPPED | OUTPATIENT
Start: 2017-12-18

## 2017-12-18 RX ORDER — OXYCODONE HYDROCHLORIDE 5 MG/1
5 TABLET ORAL EVERY 4 HOURS PRN
Qty: 30 TABLET | Refills: 0 | Status: ON HOLD | OUTPATIENT
Start: 2017-12-18 | End: 2018-02-21

## 2017-12-18 RX ORDER — ACETAMINOPHEN 500 MG
1000 TABLET ORAL EVERY 8 HOURS
Qty: 40 TABLET | Refills: 0 | Status: ON HOLD | OUTPATIENT
Start: 2017-12-18 | End: 2018-02-17

## 2017-12-18 RX ADMIN — ACETAMINOPHEN 1000 MG: 500 TABLET, FILM COATED ORAL at 06:41

## 2017-12-18 RX ADMIN — OXYCODONE HYDROCHLORIDE 5 MG: 5 TABLET ORAL at 12:33

## 2017-12-18 RX ADMIN — LEVOTHYROXINE SODIUM 75 MCG: 75 TABLET ORAL at 08:48

## 2017-12-18 RX ADMIN — MICONAZOLE NITRATE: 2 POWDER TOPICAL at 08:50

## 2017-12-18 RX ADMIN — HYDROXYUREA 1000 MG: 500 CAPSULE ORAL at 09:12

## 2017-12-18 RX ADMIN — ONDANSETRON 4 MG: 4 TABLET, ORALLY DISINTEGRATING ORAL at 11:16

## 2017-12-18 RX ADMIN — POTASSIUM CHLORIDE 20 MEQ: 1500 TABLET, EXTENDED RELEASE ORAL at 08:48

## 2017-12-18 RX ADMIN — BUMETANIDE 4 MG: 1 TABLET ORAL at 08:48

## 2017-12-18 RX ADMIN — OXYCODONE HYDROCHLORIDE 5 MG: 5 TABLET ORAL at 08:48

## 2017-12-18 NOTE — DISCHARGE SUMMARY
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  Thrombocytosis  Atrial fibrillation on coumadin             Discharge Disposition:   Discharged to home           Allergies:   Allergies   Allergen Reactions     Ciprofloxacin Hives     Vicodin [Hydrocodone-Acetaminophen] Rash              Discharge Medications:   Current Discharge Medication List      START taking these medications    Details   oxyCODONE IR (ROXICODONE) 5 MG tablet Take 1 tablet (5 mg) by mouth every 4 hours as needed for moderate to severe pain  Qty: 30 tablet, Refills: 0    Associated Diagnoses: Closed fracture of multiple ribs of right side, initial encounter      acetaminophen (TYLENOL) 500 MG tablet Take 2 tablets (1,000 mg) by mouth every 8 hours  Qty: 40 tablet, Refills: 0    Associated Diagnoses: Closed fracture of multiple ribs of right side, initial encounter      senna-docusate (SENOKOT-S;PERICOLACE) 8.6-50 MG per tablet Take 1 tablet by mouth 2 times daily as needed for constipation  Qty: 30 tablet, Refills: 1    Associated Diagnoses: Closed fracture of multiple ribs of right side, initial encounter      lidocaine (LIDODERM) 5 % Patch Apply up to 3 patches to painful area at once for up to 12 h within a 24 h period.  Remove after 12 hours.  Qty: 30 patch, Refills: 1    Associated Diagnoses: Closed fracture of multiple ribs of right side, initial encounter         CONTINUE these medications which have NOT CHANGED    Details   LEVOTHYROXINE SODIUM PO Take  50 mcg by mouth daily      polyethylene glycol (MIRALAX/GLYCOLAX) Packet Take 1 packet by mouth daily as needed for constipation      Potassium Chloride Magdalene ER (K-DUR PO) Take 20 mEq by mouth daily      Bumetanide (BUMEX PO) Take 4 mg by mouth daily       warfarin (COUMADIN) 2 MG tablet Take 1 mg by mouth daily   Qty: 30 tablet, Refills: 0      SIMVASTATIN PO Take 20 mg by mouth At Bedtime      nystatin (MYCOSTATIN) 399624 UNIT/GM POWD Apply topically 2 times daily Under breasts      metoprolol (TOPROL XL) 50 MG 24 hr tablet Take 50 mg by mouth 2 times daily      Calcium Carbonate-Vitamin D (CALTRATE 600+D PO) Take 1 tablet by mouth daily       CHOLECALCIFEROL Take 1,000 Units by mouth daily      hydroxyurea (HYDREA) 500 MG capsule Take 1,000 mg by mouth daily                     Condition on Discharge:   Discharge condition: Stable   Discharge vitals: Blood pressure 119/61, pulse 75, temperature 97.3  F (36.3  C), temperature source Oral, resp. rate 18, weight 81.6 kg (180 lb), SpO2 97 %.   Code status on discharge: DNR / DNI      BASIC PHYSICAL EXAMINATION:  GENERAL: No apparent distress.  CARDIOVASCULAR: Regular rate and rhythm without murmurs.  PULMONARY: Clear to auscultation bilaterally.   GASTROINTESTINAL: Abdomen soft, non-tender.  EXTREMITIES: No edema, pulses intact.  NEUROLOGIC: No focal deficits.          History of Illness:   See detailed admission note for full details.               Procedures excluding imaging which is summarized below:   See EMR           Consultations:   PHARMACY TO DOSE WARFARIN  PHYSICAL THERAPY ADULT IP CONSULT          Significant Results:   Results for orders placed or performed during the hospital encounter of 12/16/17   Ribs XR, unilat 3 views + PA chest, right    Narrative    RIGHT RIBS AND CHEST THREE VIEWS  12/16/2017 9:39 AM     HISTORY: Acute pain after blunt trauma.    COMPARISON: 9/10/2017.    FINDINGS: There is a large hiatal hernia; this was present on the  prior  exam of 9/10/2017. Cardiomegaly. Pulmonary vascular congestion.  Slight scattered fibrosis. Minimal blunting of the left costophrenic  angle due to fluid or thickened pleura, unchanged in the interval.    Right rib views demonstrate some cortical discontinuity and deformity  of what appears to be the right fourth and fifth ribs laterally. I  cannot determine with certainty if these are acute versus old  fractures, and clinical correlation requested in these regions. The  right lower ribs appear intact.      Impression    IMPRESSION:  1. Deformity of the right fourth and fifth ribs laterally which could  be due to old trauma if this is not the area of current clinical  concern. The right lower ribs appear intact.  2. Cardiomegaly with hiatal hernia and pulmonary vascular congestion,  unchanged in the interval.    EVA TAPIA MD   CT Head w/o Contrast    Narrative    CT HEAD WITHOUT CONTRAST  12/16/2017 9:58 AM    HISTORY:  Acute head trauma, anticoagulated, nonfocal exam.     TECHNIQUE: Scans were obtained through the head without IV contrast.   Radiation dose for this scan was reduced using automated exposure  control, adjustment of the mA and/or kV according to patient size, or  iterative reconstruction technique.    COMPARISON: None.    FINDINGS: Moderate atrophy. Mild chronic white matter disease, likely  small vessel ischemic change.  No hemorrhage, mass lesion, or focal  area of acute infarction identified. Paranasal sinuses are normal.  Degenerative changes right mandibular condyle.      Impression    IMPRESSION:   1. Atrophy and chronic white matter disease.  2. Nothing acute.     EVA TAPIA MD   CT Cervical Spine w/o Contrast    Narrative    CT CERVICAL SPINE WITHOUT CONTRAST  12/16/2017 9:56 AM    HISTORY:  Neck pain after trauma.    COMPARISON: None.    TECHNIQUE: Multiplanar CT cervical spine through T1. Radiation dose  for this scan was reduced using automated exposure control, adjustment  of  the mA and/or kV according to patient size, or iterative  reconstruction technique.     FINDINGS: Alignment is normal through T1. No evidence for cervical  spine fracture. Degenerative changes as follows:    Advanced degenerative changes at the right C1-C2 articulation.    C2-C3: Mild facet joint disease on the left.    C3-C4: Mild facet joint disease bilaterally. Minimal annular bulge.    C4-C5: Mild disc space narrowing. Minimal ventral disc osteophyte  complex. No significant central or lateral stenosis.    C5-C6: Advanced degenerative narrowing of the interspace and vacuum  sign of degenerative disc disease. Moderate facet joint disease on the  right.    C6-C7: Negative.    C7-T1: Negative.      Impression    IMPRESSION:  1. Multilevel degenerative change.  2. No fracture or acute abnormality.    EVA TAPIA MD                Pending Results:   Unresulted Labs Ordered in the Past 30 Days of this Admission     No orders found from 10/17/2017 to 12/17/2017.                        Discharge Instructions and Follow-Up:   Discharge instructions and follow-up:   Discharge Procedure Orders  Home Care OT Referral for Hospital Discharge     Home Care PT Referral for Hospital Discharge   Referral Type: Home Health Therapies & Aides     Home care nursing referral   Referral Type: Home Health Therapies & Aides     Follow-up and recommended labs and tests    Order Comments: Follow up with primary care provider, Linda Park, within 7 days for hospital follow- up.  No follow up labs or test are needed.  Consider follow up with cardiology.  The patient was provided with a limited supply of oral narcotic medication, as it was indicated by their medical condition.  The patient was instructed not to take the pain medication above the prescribed dose and frequency due to the potential for addiction, respiratory depression, and even death. The patient expressed understanding of these instructions and verbally contracted  not to misuse the medication.     Activity   Order Comments: Your activity upon discharge: activity as tolerated and no driving for today   Order Specific Question Answer Comments   Is discharge order? Yes      MD face to face encounter   Order Comments: Documentation of Face to Face and Certification for Home Health Services    I certify that patient: Antoinette Cowan is under my care and that I, or a nurse practitioner or physician's assistant working with me, had a face-to-face encounter that meets the physician face-to-face encounter requirements with this patient on: 12/18/2017.    This encounter with the patient was in whole, or in part, for the following medical condition, which is the primary reason for home health care: acute rib fractures.    I certify that, based on my findings, the following services are medically necessary home health services: Nursing, Occupational Therapy and Physical Therapy.    My clinical findings support the need for the above services because: Nurse is needed: To assess pain after changes in medications or other medical regimen. and To provide caregiver training to assist with: meds, pain management.., Occupational Therapy Services are needed to assess and treat cognitive ability and address ADL safety due to impairment in conditioning, mental status. and Physical Therapy Services are needed to assess and treat the following functional impairments: debilitation.    Further, I certify that my clinical findings support that this patient is homebound (i.e. absences from home require considerable and taxing effort and are for medical reasons or [employer] services or infrequently or of short duration when for other reasons) because: Leaving home is medically contraindicated for the following reason(s): Dyspnea on exertion that makes it so they cannot leave their home for needed services without clinical deterioration. and Unable to tolerate sitting for more than 20 minutes...    Based  on the above findings. I certify that this patient is confined to the home and needs intermittent skilled nursing care, physical therapy and/or speech therapy.  The patient is under my care, and I have initiated the establishment of the plan of care.  This patient will be followed by a physician who will periodically review the plan of care.  Physician/Provider to provide follow up care: Linda Park    Attending hospital physician (the Medicare certified Laura provider): Lamont Singh  Physician Signature: See electronic signature associated with these discharge orders.  Date: 12/18/2017     DNR/DNI     Diet   Order Comments: Follow this diet upon discharge: Orders Placed This Encounter     Room Service     Combination Diet Low Saturated Fat Na <2400mg Diet   Order Specific Question Answer Comments   Is discharge order? Yes                Hospital Course:   89 year old female with a history of Afib, breast cancer, chronic pain, DVT, HLP, and HTN was walking across the room when she slipped and fell, landing on her right side.  She was unable to get up and her daughter was unable to help her to get up due to the pain.  Ambulance was called and patient was transferred to [facility].  Patient denies any loss of consciousness she denies hitting her head.  Imaging only concerning for right fourth and fifth lateral rib fractures and vascular congestion.  Her rib deformity and possible fractures were treated with multimodal analgesics.  She has leg edema but reports this is chronic.  TTE shows TR and OH with severe pHTN.  She is already on bumex and anticoagulated.  She has been weaned off O2 and has no respiratory symptoms.  I recommended follow up with cardiology.  She feels well today and has declined TCU.  She will discharge home with her daughter.     The patient was seen, examined, and counseled on this day. The hospitalization and plan of care was reviewed with the patient and daughter  extensively. All questions were addressed and the patient agreed to follow-up as noted above.     Addendum: Updated that patient became hypoxic with activity. Will order home O2.     Total time spent in face to face contact with the patient and coordinating discharge was:  35 Minutes    Lamont Singh DO, MPH  [facility] Hospitalist  [address] [place] [address]  Pager: [phone]  12/18/2017

## 2017-12-18 NOTE — PROGRESS NOTES
Patient has been assessed for Home Oxygen needs. Oxygen readings:    *Pulse oximetry (SpO2) = 90% on room air at rest while awake.    *SpO2 improved to 90% on 0liters/minute at rest.    *SpO2 = 82% on room air during activity/with exercise.    *SpO2 improved to 92-91% on 2liters/minute during activity/with exercise.

## 2017-12-18 NOTE — PROGRESS NOTES
AMADO    D: Per medical staff, 2 liters of oxygen were ordered for home. AMADO ordered this through [facility]. Oxygen was delivered to the hospital. Patient's daughter will transport patient back to her [facility]. AMADO faxed discharge orders to [facility].     P: Continue to follow.

## 2017-12-18 NOTE — PLAN OF CARE
"Pt alert, oriented to self. Somewhat agitated, asking \"where is everybody,\" and minimally redirectable. Heavy assist of 2 to commode, increased agitation noted when pt needs to use commode. IV SL. Oxycodone given for pain. BLE edema. Pt appetite poor, daughter at bedside. Possible d/c 12/18 back to assisted living facility.   "

## 2017-12-18 NOTE — PROGRESS NOTES
Patient discharging to home today. Home care PT/OT/RN has been ordered. Patient's daughter has agreed to use [facility].  Referral e-mailed to [facility]. Patient will be returning to her assisted living at [facility]. Her 2 daughters will be trading off staying with their parents. No further discharge needs at this time.  waiting for oxygen to be delivered for discharge.

## 2017-12-18 NOTE — PLAN OF CARE
Problem: Patient Care Overview  Goal: Plan of Care/Patient Progress Review  Outcome: Improving  Pt alert to self, lethargic overnight, awake and alert this morning.  Agitated overnight but was able to redirect, denied pain, had scheduled Tylenol.  Vs stable, oxygen applied at 2 liters, lungs diminished.  Turning and repositioning frequently with assist x 2 and lift.  Incontinent of urine, encouraging po fluids, bilateral lower extremity edema, legs zaida.  Will continue to monitor.

## 2018-01-08 ENCOUNTER — RECORDS - HEALTHEAST (OUTPATIENT)
Dept: LAB | Facility: CLINIC | Age: 83
End: 2018-01-08

## 2018-01-09 LAB — INR PPP: 1.98 (ref 0.9–1.1)

## 2018-01-24 ENCOUNTER — RECORDS - HEALTHEAST (OUTPATIENT)
Dept: LAB | Facility: CLINIC | Age: 83
End: 2018-01-24

## 2018-01-24 LAB — INR PPP: 1.77 (ref 0.9–1.1)

## 2018-01-26 ENCOUNTER — RECORDS - HEALTHEAST (OUTPATIENT)
Dept: LAB | Facility: CLINIC | Age: 83
End: 2018-01-26

## 2018-01-26 LAB
ANION GAP SERPL CALCULATED.3IONS-SCNC: 8 MMOL/L (ref 5–18)
BUN SERPL-MCNC: 21 MG/DL (ref 8–28)
CALCIUM SERPL-MCNC: 8.9 MG/DL (ref 8.5–10.5)
CHLORIDE BLD-SCNC: 99 MMOL/L (ref 98–107)
CO2 SERPL-SCNC: 36 MMOL/L (ref 22–31)
CREAT SERPL-MCNC: 1.06 MG/DL (ref 0.6–1.1)
GFR SERPL CREATININE-BSD FRML MDRD: 49 ML/MIN/1.73M2
GLUCOSE BLD-MCNC: 153 MG/DL (ref 70–125)
POTASSIUM BLD-SCNC: 3.8 MMOL/L (ref 3.5–5)
SODIUM SERPL-SCNC: 143 MMOL/L (ref 136–145)

## 2018-01-29 ENCOUNTER — RECORDS - HEALTHEAST (OUTPATIENT)
Dept: LAB | Facility: CLINIC | Age: 83
End: 2018-01-29

## 2018-01-30 LAB — INR PPP: 2.21 (ref 0.9–1.1)

## 2018-02-05 ENCOUNTER — RECORDS - HEALTHEAST (OUTPATIENT)
Dept: LAB | Facility: CLINIC | Age: 83
End: 2018-02-05

## 2018-02-06 LAB
ANION GAP SERPL CALCULATED.3IONS-SCNC: 7 MMOL/L (ref 5–18)
BUN SERPL-MCNC: 24 MG/DL (ref 8–28)
CALCIUM SERPL-MCNC: 9.1 MG/DL (ref 8.5–10.5)
CHLORIDE BLD-SCNC: 99 MMOL/L (ref 98–107)
CO2 SERPL-SCNC: 36 MMOL/L (ref 22–31)
CREAT SERPL-MCNC: 1.13 MG/DL (ref 0.6–1.1)
GFR SERPL CREATININE-BSD FRML MDRD: 45 ML/MIN/1.73M2
GLUCOSE BLD-MCNC: 145 MG/DL (ref 70–125)
POTASSIUM BLD-SCNC: 4 MMOL/L (ref 3.5–5)
SODIUM SERPL-SCNC: 142 MMOL/L (ref 136–145)

## 2018-02-13 ENCOUNTER — RECORDS - HEALTHEAST (OUTPATIENT)
Dept: LAB | Facility: CLINIC | Age: 83
End: 2018-02-13

## 2018-02-13 LAB
ANION GAP SERPL CALCULATED.3IONS-SCNC: 6 MMOL/L (ref 5–18)
BUN SERPL-MCNC: 34 MG/DL (ref 8–28)
CALCIUM SERPL-MCNC: 9.4 MG/DL (ref 8.5–10.5)
CHLORIDE BLD-SCNC: 101 MMOL/L (ref 98–107)
CO2 SERPL-SCNC: 36 MMOL/L (ref 22–31)
CREAT SERPL-MCNC: 1.33 MG/DL (ref 0.6–1.1)
GFR SERPL CREATININE-BSD FRML MDRD: 38 ML/MIN/1.73M2
GLUCOSE BLD-MCNC: 154 MG/DL (ref 70–125)
INR PPP: 4.59 (ref 0.9–1.1)
POTASSIUM BLD-SCNC: 4.2 MMOL/L (ref 3.5–5)
SODIUM SERPL-SCNC: 143 MMOL/L (ref 136–145)

## 2018-02-16 ENCOUNTER — HOSPITAL ENCOUNTER (INPATIENT)
Facility: CLINIC | Age: 83
LOS: 4 days | Discharge: HOME HEALTH | DRG: 605 | End: 2018-02-21
Attending: EMERGENCY MEDICINE | Admitting: INTERNAL MEDICINE
Payer: COMMERCIAL

## 2018-02-16 DIAGNOSIS — I10 ESSENTIAL HYPERTENSION: ICD-10-CM

## 2018-02-16 DIAGNOSIS — D68.32 WARFARIN-INDUCED COAGULOPATHY (H): ICD-10-CM

## 2018-02-16 DIAGNOSIS — S22.41XA CLOSED FRACTURE OF MULTIPLE RIBS OF RIGHT SIDE, INITIAL ENCOUNTER: ICD-10-CM

## 2018-02-16 DIAGNOSIS — S81.812A LACERATION OF LEFT LOWER EXTREMITY, INITIAL ENCOUNTER: ICD-10-CM

## 2018-02-16 DIAGNOSIS — T45.515A WARFARIN-INDUCED COAGULOPATHY (H): ICD-10-CM

## 2018-02-16 DIAGNOSIS — D62 ANEMIA DUE TO BLOOD LOSS, ACUTE: ICD-10-CM

## 2018-02-16 DIAGNOSIS — I89.0 LYMPHEDEMA OF BOTH LOWER EXTREMITIES: Primary | ICD-10-CM

## 2018-02-16 LAB
BASOPHILS # BLD AUTO: 0 10E9/L (ref 0–0.2)
BASOPHILS NFR BLD AUTO: 0.9 %
DIFFERENTIAL METHOD BLD: ABNORMAL
EOSINOPHIL # BLD AUTO: 0.3 10E9/L (ref 0–0.7)
EOSINOPHIL NFR BLD AUTO: 5.9 %
ERYTHROCYTE [DISTWIDTH] IN BLOOD BY AUTOMATED COUNT: 15.3 % (ref 10–15)
HCT VFR BLD AUTO: 28.9 % (ref 35–47)
HGB BLD-MCNC: 9.3 G/DL (ref 11.7–15.7)
IMM GRANULOCYTES # BLD: 0 10E9/L (ref 0–0.4)
IMM GRANULOCYTES NFR BLD: 0.5 %
INR PPP: 2.87 (ref 0.86–1.14)
LYMPHOCYTES # BLD AUTO: 0.9 10E9/L (ref 0.8–5.3)
LYMPHOCYTES NFR BLD AUTO: 21.1 %
MCH RBC QN AUTO: 40.3 PG (ref 26.5–33)
MCHC RBC AUTO-ENTMCNC: 32.2 G/DL (ref 31.5–36.5)
MCV RBC AUTO: 125 FL (ref 78–100)
MONOCYTES # BLD AUTO: 0.3 10E9/L (ref 0–1.3)
MONOCYTES NFR BLD AUTO: 5.9 %
NEUTROPHILS # BLD AUTO: 2.9 10E9/L (ref 1.6–8.3)
NEUTROPHILS NFR BLD AUTO: 65.7 %
NRBC # BLD AUTO: 0 10*3/UL
NRBC BLD AUTO-RTO: 1 /100
PLATELET # BLD AUTO: 262 10E9/L (ref 150–450)
RBC # BLD AUTO: 2.31 10E12/L (ref 3.8–5.2)
WBC # BLD AUTO: 4.4 10E9/L (ref 4–11)

## 2018-02-16 PROCEDURE — 85025 COMPLETE CBC W/AUTO DIFF WBC: CPT | Performed by: EMERGENCY MEDICINE

## 2018-02-16 PROCEDURE — 85610 PROTHROMBIN TIME: CPT | Performed by: EMERGENCY MEDICINE

## 2018-02-16 PROCEDURE — 12006 RPR S/N/A/GEN/TRK20.1-30.0CM: CPT

## 2018-02-16 PROCEDURE — 0HQLXZZ REPAIR LEFT LOWER LEG SKIN, EXTERNAL APPROACH: ICD-10-PCS | Performed by: EMERGENCY MEDICINE

## 2018-02-16 PROCEDURE — 99285 EMERGENCY DEPT VISIT HI MDM: CPT | Mod: 25

## 2018-02-16 ASSESSMENT — ENCOUNTER SYMPTOMS: WOUND: 1

## 2018-02-16 NOTE — IP AVS SNAPSHOT
` ` Patient Information     Patient Name Sex     Antoinette Cowan ([id number]) Female 4/3/1928       Room Bed    5504 5504-02      Patient Demographics     Address Phone    [address]   [place] [id number] [phone] (Home)  [phone] (Mobile) *Preferred*      Patient Ethnicity & Race     Ethnic Group Patient Race    American White      Emergency Contact(s)     Name Relation Home Work Mobile    Thomas Cowan Spouse [phone]      Nicol Cowan  Daughter [phone]      Documents on File        Status Date Received Description       Documents for the Patient    Insurance Card  09     AVERY Face Sheet Received () 09     Privacy Notice - [place]  09     Waiver - Payment  09     External Medication Information Consent       Patient ID Received 13     Consent for Services - Hospital/Clinic Received () 13     Consent for EHR Access Received 13     [facility] Specified Other       Insurance Card Received 13     Privacy Notice - [place] Received 13     Consent for Services - Geriatrics Received 13     Consent for Services/Privacy Notice - Hospital/Clinic Received 09/10/17     Advance Directives and Living Will Not Received  Validation of AD 12/22/15    Advance Directives and Living Will Received 17 Health Care Directive 12/22/15    Care Everywhere Prospective Auth Received 17     HIM MADHAVI Authorization  17     HIM MADHAVI Authorization  18     Care Everywhere Prospective Auth Received (Deleted) 17        Documents for the Encounter    Photograph   Left anterior tibial ridge    Photograph       EMS/Ambulance Record  18 [place] FIRE DEPARTMENT    CMS IM for Patient Signature Received 18       Admission Information     Attending Provider Admitting Provider Admission Type Admission Date/Time    Davian Sims MD Devlin, Jonny Naranjo MD Emergency 18    Discharge Date  Hospital Service Auth/Cert Status Service Area     Hospitalist Incomplete [facility]    Unit Room/Bed Admission Status        55 ORTHO SPEC UNIT 5504/5504-02 Admission (Confirmed)       Admission     Complaint    Knee laceration, Open knee wound      Hospital Account     Name Acct ID Class Status Primary Coverage    Antoinette Cowan [id number] Inpatient Open UCARE - [employer] FOR [employer] [facility] [employer]            Guarantor Account (for Hospital Account #[id number])     Name Relation to Pt Service Area Active? Acct Type    Antoinette Cowan  FCS Yes Personal/Family    Address Phone          [address] [place] [address] [phone](H)              Coverage Information (for Hospital Account #[id number])     F/O Payor/Plan Precert #    UCARE/UCARE FOR SENIORS [facility] [employer]     Subscriber Subscriber #    Antoinette Cowan [id number]    Address Phone    [address]  [place] [address] [phone]

## 2018-02-16 NOTE — IP AVS SNAPSHOT
"[place] [address] [facility]: [phone]                                              INTERAGENCY TRANSFER FORM - PHYSICIAN ORDERS   2018                    Hospital Admission Date: 2018  SIERRA CALABRESE   : 4/3/1928  Sex: Female        Attending Provider: Davian Sims MD     Allergies:  Clindamycin, Ciprofloxacin, Vicodin [Hydrocodone-acetaminophen]    Infection:  None   Service:  HOSPITALIST    Ht:  1.626 m (5' 4\")   Wt:  81.6 kg (180 lb)   Admission Wt:  --    BMI:  30.9 kg/m 2   BSA:  1.92 m 2            Patient PCP Information     Provider PCP Type    Linda JAXSON Katrinademond [facility]      ED Clinical Impression     Diagnosis Description Comment Added By Time Added    Laceration of left lower extremity, initial encounter [S81.812A] Laceration of left lower extremity, initial encounter [S81.812A]  Shiva Norris MD 2018 11:27 PM    Anemia due to blood loss, acute [D62] Anemia due to blood loss, acute [D62]  Shiva Norris MD 2018 11:27 PM    Warfarin-induced coagulopathy (H) [D68.9, T45.515A] Warfarin-induced coagulopathy (H) [D68.9, T45.515A]  Shiva Norris MD 2018 11:27 PM      Hospital Problems as of 2018              Priority Class Noted POA    Knee laceration Medium  2018 Yes    Open knee wound Medium  2018 Yes      Non-Hospital Problems as of 2018              Priority Class Noted    DVT (deep venous thrombosis) (H) Medium  9/3/2013    Edema Medium  9/3/2013    Thrombocytosis (H) Medium  9/3/2013    Generalized weakness Medium  9/3/2013    Hypothyroidism Medium  9/3/2013    Memory loss Medium  9/3/2013    Constipation Medium  2013    Dehydration Medium  9/10/2017    Fracture of rib of right side Medium  2017      Code Status History     Date Active Date Inactive Code Status Order ID Comments User Context    2018  7:51 AM  DNR/DNI [id number]  Davian Sims MD Outpatient    2018  1:42 AM " 2/21/2018  7:51 AM DNR/DNI [id number]  Jonny Devlin MD Inpatient    12/18/2017  9:38 AM 2/17/2018  1:42 AM DNR/DNI [id number]  Lamont Singh DO Outpatient    12/16/2017  1:54 PM 12/18/2017  9:38 AM DNR/DNI [id number]  Eduin Cabrera MD Inpatient    9/12/2017  9:14 AM 12/16/2017  1:54 PM DNR/DNI [id number]  Fela Calrk MD Outpatient    9/10/2017  7:40 PM 9/12/2017  9:14 AM DNR/DNI [id number]  Renée Greenberg MD Inpatient    8/30/2013  1:34 PM 9/10/2017  7:40 PM Full Code [id number]  Gladis Leblanc Outpatient         Medication Review      START taking        Dose / Directions Comments    cephalexin 250 MG/5ML suspension   Commonly known as:  KEFLEX   Indication:  Infection of the Skin and/or Related Soft Tissue   Used for:  Laceration of left lower extremity, initial encounter        Dose:  500 mg   Take 10 mLs (500 mg) by mouth 3 times daily   Quantity:  210 mL   Refills:  0          CONTINUE these medications which may have CHANGED, or have new prescriptions. If we are uncertain of the size of tablets/capsules you have at home, strength may be listed as something that might have changed.        Dose / Directions Comments    metoprolol succinate 25 MG 24 hr tablet   Commonly known as:  TOPROL-XL   This may have changed:    - medication strength  - how much to take   Used for:  Essential hypertension        Dose:  12.5 mg   Take 0.5 tablets (12.5 mg) by mouth 2 times daily   Quantity:  30 tablet   Refills:  3          CONTINUE these medications which have NOT CHANGED        Dose / Directions Comments    ACETAMINOPHEN PO        Dose:  1000 mg   Take 1,000 mg by mouth every 6 hours as needed for pain or fever   Refills:  0        HYDREA 500 MG capsule CHEMO   Generic drug:  hydroxyurea        Dose:  1000 mg   Take 1,000 mg by mouth daily   Refills:  0        LEVOTHYROXINE SODIUM PO        Dose:  50 mcg   Take 50 mcg by mouth daily   Refills:  0        lidocaine 5 % Patch   Commonly known as:   LIDODERM   Used for:  Closed fracture of multiple ribs of right side, initial encounter        Apply up to 3 patches to painful area at once for up to 12 h within a 24 h period.  Remove after 12 hours.   Quantity:  30 patch   Refills:  1        nystatin [id number] UNIT/GM Powd   Commonly known as:  MYCOSTATIN        Apply topically 2 times daily Under breasts   Refills:  0        polyethylene glycol Packet   Commonly known as:  MIRALAX/GLYCOLAX        Dose:  1 packet   Take 1 packet by mouth every other day   Refills:  0        saccharomyces boulardii 250 MG capsule   Commonly known as:  FLORASTOR        Dose:  250 mg   Take 250 mg by mouth daily   Refills:  0        senna-docusate 8.6-50 MG per tablet   Commonly known as:  SENOKOT-S;PERICOLACE        Dose:  1 tablet   Take 1 tablet by mouth daily as needed for constipation   Refills:  0        SIMVASTATIN PO        Dose:  20 mg   Take 20 mg by mouth At Bedtime   Refills:  0        WARFARIN SODIUM PO        Dose:  1 mg   Take 1 mg by mouth daily   Refills:  0          STOP taking     BUMEX PO           IBUPROFEN PO           K-DUR PO           METOLAZONE PO           oxyCODONE IR 5 MG tablet   Commonly known as:  ROXICODONE                     Further instructions from your care team        Patient care order DAILY     Comments: Gradient Compression Wraps   1. To be worn for 23/24 hour per day.   2. To be worn from base of the toes (toes should be exposed) to ~1/2 inch below knee crease.   3. The wraps should be applied in this order: A. Tube bandage from base of toes to just below the crease in the knee; B. More narrow brown roll from base of toes to ankle; C. Wider brown roll from ankle to just below knee crease. Brown bandages should be applied in spiral fashion without full stretch. Layers should be placed close together near the toes, and gradually widen the closer to the knee to produce desired compression.   4. RN should completely remove compression wraps for 1  hour each day for skin care, skin assessment, wound care if needed, and then re-don. Fully remove and contact the edema therapist if legs or feet become painful, change in sensation, SOB, change in skin color, or pt otherwise does not tolerate wrapping.   5. If wraps become loose please tighten and re-tape, or fully unroll and re-wrap.   6. When patient discharges, please send bandages and supplies with patient.   Cleanin. Please rinse all compression wrap bandages and tube bandages in the patient's sink every 2-3 days starting 18. Lay them flat to dry on a towel. DO NOT hang to dry as this stretches them out and they lose compression.   2. Remove if pain, numbness, change in skin color and/or become soiled/wet.   3. Please try to prevent areas of bunching up and rolling to decrease risk of tourniquet effect (cutting off circulation and lymph flow to limb).   4. DO NOT throw away         Wound care DAILY     Comments: Plan of care for wound located on left LE: daily   Carefully remove old dressings.  If they stick then wet prior to removal- don't tear them off   1. Clean both legs and feet with gentle soap and water, getting between the toes   2. Apply lotion from toes to knees, not between toes.   3. Clean wound on left anterior/ lateral shin with MicroKlenz Spray, pat dry   4.  Paint the entire area, including and especially the wound and suture line, with No Sting Skin Prep- this will help protect the skin from moisture and drainage and help keep sutures together until wound heals more.   5. Cover wounds/ suture lines with Aquacel Ag- cut wide strips as needed.   6. Lightly mist the Aquacel Ag with MicroKlenz Spray in areas where there is no drainage.  If drainage then leave Aquacel dry to better absorb drainage.   7. Cover with ABD   8. Secure with Ana wrap, time and date   9.  Compression as ordered- making sure to time and date the tape when wraps applied   10.  Keep heels elevated at all times    - consider stopping Aquacel Ag dressings when no more drainage, can just cover sutures with an ABD so they don't snag on the wraps.            After Care     Activity       Your activity upon discharge: activity as tolerated       Diet       Follow this diet upon discharge: Orders Placed This Encounter      Regular Diet Adult             Referrals     Home Care OT Referral for Hospital Discharge       OT to eval and treat Lymphedema    Your provider has ordered home care - occupational therapy. If you have not been contacted within 2 days of your discharge please call  [phone]       Home Care PT Referral for Hospital Discharge       PT to eval and treat for lymphedema and physical deconditioning    .  You have been discharged with services from [facility]. They will call you to set up initial appointment. If you have questions please call the 24 hr patient line @ [phone]       Home care nursing referral       RN skilled nursing visit. RN to assess incision for signs/symptoms of infection, hydration, nutrition and bowel status and home safety.    You have been discharged with services from [facility]. They will call you to set up initial appointment. If you have questions please call the 24 hr patient line @ [phone]              MD face to face encounter       Documentation of Face to Face and Certification for Home Health Services    I certify that patient: Antoinette Cowan is under my care and that I, or a nurse practitioner or physician's assistant working with me, had a face-to-face encounter that meets the physician face-to-face encounter requirements with this patient on: 2/21/2018.    This encounter with the patient was in whole, or in part, for the following medical condition, which is the primary reason for home health care: Left leg skin wound, lymphedema.    I certify that, based on my findings, the following services are medically necessary home health services: Nursing and  Physical Therapy.    My clinical findings support the need for the above services because: Nurse is needed: To provide assessment and oversight required in the home to assure adherence to the medical plan due to: skin wound/lacration.. and Physical Therapy Services are needed to assess and treat the following functional impairments: lymphedema of the legs.    Further, I certify that my clinical findings support that this patient is homebound (i.e. absences from home require considerable and taxing effort and are for medical reasons or [employer] services or infrequently or of short duration when for other reasons) because: Requires assistance of another person or specialized equipment to access medical services because patient: Is unable to exit home safely on own due to: dementia.    Based on the above findings. I certify that this patient is confined to the home and needs intermittent skilled nursing care, physical therapy and/or speech therapy.  The patient is under my care, and I have initiated the establishment of the plan of care.  This patient will be followed by a physician who will periodically review the plan of care.  Physician/Provider to provide follow up care: Linda Park    Attending hospital physician (the Medicare certified [place] provider): Davian Sims  Physician Signature: See electronic signature associated with these discharge orders.  Date: 2/21/2018                  Follow-Up Appointment Instructions     Future Labs/Procedures    Follow-up and recommended labs and tests      Comments:    Follow up with PCP in one week with INR, CBC, and BMP.  Sutures are to be removed on March 1st.  Please continue to use lower extremity wraps and keep legs elevated.    Follow-up and recommended labs and tests      Comments:    You have an Appointment Thursday March 1, 2018 @ 1:45 (Labs). 2:00pm with Dr. Brown  Sutures to be removed.   Labs: CBC, INR, BMP  [address]  [facility]  [phone]      Follow-Up Appointment Instructions     Follow-up and recommended labs and tests        Follow up with PCP in one week with INR, CBC, and BMP.  Sutures are to be removed on March 1st.  Please continue to use lower extremity wraps and keep legs elevated.       Follow-up and recommended labs and tests        You have an Appointment Thursday March 1, 2018 @ 1:45 (Labs). 2:00pm with Dr. Brown  Sutures to be removed.   Labs: CBC, INR, BMP  [address]  [phone]             Statement of Approval     Ordered          02/21/18 0842  I have reviewed and agree with all the recommendations and orders detailed in this document.  EFFECTIVE NOW     Approved and electronically signed by:  Davian Sims MD

## 2018-02-16 NOTE — IP AVS SNAPSHOT
"          [place] [address] [facility]: [phone]                                              INTERAGENCY TRANSFER FORM - LAB / IMAGING / EKG / EMG RESULTS   2018                    Hospital Admission Date: 2018  SIERRA CALABRESE   : 4/3/1928  Sex: Female        Attending Provider: Davian Sims MD     Allergies:  Clindamycin, Ciprofloxacin, Vicodin [Hydrocodone-acetaminophen]    Infection:  None   Service:  HOSPITALIST    Ht:  1.626 m (5' 4\")   Wt:  81.6 kg (180 lb)   Admission Wt:  --    BMI:  30.9 kg/m 2   BSA:  1.92 m 2            Patient PCP Information     Provider PCP Type    [facility]         Lab Results - 3 Days      Basic metabolic panel [[id number]] (Abnormal)  Resulted: 18, Result status: Final result    Ordering provider: Davian Sims MD  18 0000 Resulting lab: [facility]    Specimen Information    Type Source Collected On   Blood  18 0550          Components       Value Reference Range Flag Lab   Sodium 139 133 - 144 mmol/L  FrStHsLb   Potassium 3.5 3.4 - 5.3 mmol/L  FrStHsLb   Chloride 102 94 - 109 mmol/L  FrStHsLb   Carbon Dioxide 30 20 - 32 mmol/L  FrStHsLb   Anion Gap 7 3 - 14 mmol/L  FrStHsLb   Glucose 103 70 - 99 mg/dL H FrStHsLb   Urea Nitrogen 38 7 - 30 mg/dL H FrStHsLb   Creatinine 1.25 0.52 - 1.04 mg/dL H FrStHsLb   GFR Estimate 40 >60 mL/min/1.7m2 L FrStHsLb   Comment:  Non  GFR Calc   GFR Estimate If Black 49 >60 mL/min/1.7m2 L FrStHsLb   Comment:  African American GFR Calc   Calcium 8.3 8.5 - 10.1 mg/dL L FrStHsLb            INR [[id number]] (Abnormal)  Resulted: 18, Result status: Final result    Ordering provider: Jonny Devlin MD  18 0000 Resulting lab: [facility]    Specimen Information    Type Source Collected On   Blood  18 0550          Components       Value Reference Range Flag Lab   INR 2.89 0.86 - 1.14 H Anil          "   Hemoglobin [[id number]] (Abnormal)  Resulted: 02/20/18 0615, Result status: Final result    Ordering provider: Davian Sims MD  02/20/18 0000 Resulting lab: [facility]    Specimen Information    Type Source Collected On   Blood  02/20/18 0550          Components       Value Reference Range Flag Lab   Hemoglobin 8.2 11.7 - 15.7 g/dL L FrStHsLb            Fractional excretion of sodium [[id number]]  Resulted: 02/19/18 2305, Result status: Final result    Ordering provider: Davian Sims MD  02/18/18 1117 Resulting lab: [facility]    Specimen Information    Type Source Collected On   Urine Urine clean catch 02/19/18 2059          Components       Value Reference Range Flag Lab   Creatinine Urine 57 mg/dL  FrStHsLb   Sodium Urine mmol/L 17 mmol/L  FrStHsLb   %FENA 0.3 %  FrStHsLb   Comment:         Adult:    <1 percent            Indicates prerenal azotemia            >3 percent            Suggests acute tubular            necrosis  Neonates: <2.5 percent            Suggest prerenal azotemia            >2.5 percent            Suggest acute tubular necrosis              Sodium [[id number]]  Resulted: 02/19/18 2303, Result status: Final result    Ordering provider: Davian Sims MD  02/19/18 2118 Resulting lab: [facility]    Specimen Information    Type Source Collected On   Blood  02/19/18 2240          Components       Value Reference Range Flag Lab   Sodium 138 133 - 144 mmol/L  FrStHsLb            Creatinine [[id number]] (Abnormal)  Resulted: 02/19/18 2303, Result status: Final result    Ordering provider: Davian Sims MD  02/19/18 2118 Resulting lab: [facility]    Specimen Information    Type Source Collected On   Blood  02/19/18 2240          Components       Value Reference Range Flag Lab   Creatinine 1.32 0.52 - 1.04 mg/dL H FrStHsLb   GFR Estimate 38 >60 mL/min/1.7m2 L FrStHsLb   Comment:  Non  GFR Calc    GFR Estimate If Black 46 >60 mL/min/1.7m2 L FrStHsLb   Comment:   GFR Calc            UA with Microscopic reflex to Culture [[id number]] (Abnormal)  Resulted: 02/19/18 2112, Result status: Final result    Ordering provider: Davian Sims MD  02/18/18 1117 Resulting lab: [facility]    Specimen Information    Type Source Collected On   Midstream Urine Urine clean catch 02/19/18 2059          Components       Value Reference Range Flag Lab   Color Urine Yellow   FrStHsLb   Appearance Urine Clear   FrStHsLb   Glucose Urine Negative NEG^Negative mg/dL  FrStHsLb   Bilirubin Urine Negative NEG^Negative  FrStHsLb   Ketones Urine Negative NEG^Negative mg/dL  FrStHsLb   Specific [place] Urine 1.010 1.003 - 1.035  FrStHsLb   Blood Urine Negative NEG^Negative  FrStHsLb   pH Urine 5.5 5.0 - 7.0 pH  FrStHsLb   Protein Albumin Urine Negative NEG^Negative mg/dL  FrStHsLb   Urobilinogen mg/dL Normal 0.0 - 2.0 mg/dL  FrStHsLb   Nitrite Urine Negative NEG^Negative  FrStHsLb   Leukocyte Esterase Urine Negative NEG^Negative  FrStHsLb   Source Midstream Urine   FrStHsLb   WBC Urine 3 0 - 2 /HPF H FrStHsLb   RBC Urine 0 0 - 2 /HPF  FrStHsLb   Bacteria Urine Few NEG^Negative /HPF A FrStHsLb   Squamous Epithelial /HPF Urine 1 0 - 1 /HPF  FrStHsLb   Mucous Urine Present NEG^Negative /LPF A FrStHsLb            Hemoglobin [[id number]] (Abnormal)  Resulted: 02/19/18 1905, Result status: Final result    Ordering provider: Davian Sims MD  02/19/18 1200 Resulting lab: [facility]    Specimen Information    Type Source Collected On   Blood  02/19/18 1838          Components       Value Reference Range Flag Lab   Hemoglobin 8.4 11.7 - 15.7 g/dL L FrStHsLb            Basic metabolic panel [[id number]] (Abnormal)  Resulted: 02/19/18 0653, Result status: Final result    Ordering provider: Davian Sims MD  02/19/18 0000 Resulting lab: [facility]    Specimen Information     Type Source Collected On   Blood  02/19/18 0618          Components       Value Reference Range Flag Lab   Sodium 137 133 - 144 mmol/L  FrStHsLb   Potassium 3.4 3.4 - 5.3 mmol/L  FrStHsLb   Chloride 100 94 - 109 mmol/L  FrStHsLb   Carbon Dioxide 30 20 - 32 mmol/L  FrStHsLb   Anion Gap 7 3 - 14 mmol/L  FrStHsLb   Glucose 126 70 - 99 mg/dL H FrStHsLb   Urea Nitrogen 44 7 - 30 mg/dL H FrStHsLb   Creatinine 1.50 0.52 - 1.04 mg/dL H FrStHsLb   GFR Estimate 33 >60 mL/min/1.7m2 L FrStHsLb   Comment:  Non  GFR Calc   GFR Estimate If Black 39 >60 mL/min/1.7m2 L FrStHsLb   Comment:  African American GFR Calc   Calcium 8.3 8.5 - 10.1 mg/dL L FrStHsLb            INR [[id number]] (Abnormal)  Resulted: 02/19/18 0645, Result status: Final result    Ordering provider: Jonny Devlin MD  02/19/18 0000 Resulting lab: [facility]    Specimen Information    Type Source Collected On   Blood  02/19/18 0618          Components       Value Reference Range Flag Lab   INR 2.88 0.86 - 1.14 H FrStHsLb            Hemoglobin [[id number]] (Abnormal)  Resulted: 02/19/18 0630, Result status: Final result    Ordering provider: Davian Sims MD  02/19/18 0000 Resulting lab: [facility]    Specimen Information    Type Source Collected On   Blood  02/19/18 0618          Components       Value Reference Range Flag Lab   Hemoglobin 8.4 11.7 - 15.7 g/dL L FrStHsLb            Hemoglobin [[id number]] (Abnormal)  Resulted: 02/1935, Result status: Final result    Ordering provider: Davian Sims MD  02/18/18 1200 Resulting lab: [facility]    Specimen Information    Type Source Collected On   Blood  02/18/18 1915          Components       Value Reference Range Flag Lab   Hemoglobin 8.7 11.7 - 15.7 g/dL L FrStHsLb            INR [[id number]] (Abnormal)  Resulted: 02/18/18 0652, Result status: Final result    Ordering provider: Jonny Devlin MD  02/18/18 0000 Resulting  lab: [facility]    Specimen Information    Type Source Collected On   Blood  02/18/18 0610          Components       Value Reference Range Flag Lab   INR 2.93 0.86 - 1.14 H FrStHsLb            Basic metabolic panel [[id number]] (Abnormal)  Resulted: 02/18/18 0646, Result status: Final result    Ordering provider: Jonny Devlin MD  02/18/18 0000 Resulting lab: [facility]    Specimen Information    Type Source Collected On   Blood  02/18/18 0610          Components       Value Reference Range Flag Lab   Sodium 137 133 - 144 mmol/L  FrStHsLb   Potassium 3.7 3.4 - 5.3 mmol/L  FrStHsLb   Chloride 98 94 - 109 mmol/L  FrStHsLb   Carbon Dioxide 31 20 - 32 mmol/L  FrStHsLb   Anion Gap 8 3 - 14 mmol/L  FrStHsLb   Glucose 127 70 - 99 mg/dL H FrStHsLb   Urea Nitrogen 49 7 - 30 mg/dL H FrStHsLb   Creatinine 1.91 0.52 - 1.04 mg/dL H FrStHsLb   GFR Estimate 25 >60 mL/min/1.7m2 L FrStHsLb   Comment:  Non  GFR Calc   GFR Estimate If Black 30 >60 mL/min/1.7m2 L FrStHsLb   Comment:  African American GFR Calc   Calcium 8.4 8.5 - 10.1 mg/dL L FrStHsLb            CBC with platelets [[id number]] (Abnormal)  Resulted: 02/18/18 0629, Result status: Final result    Ordering provider: Jonny Devlin MD  02/18/18 0000 Resulting lab: [facility]    Specimen Information    Type Source Collected On   Blood  02/18/18 0610          Components       Value Reference Range Flag Lab   WBC 5.1 4.0 - 11.0 10e9/L  FrStHsLb   RBC Count 1.95 3.8 - 5.2 10e12/L L FrStHsLb   Hemoglobin 7.8 11.7 - 15.7 g/dL L FrStHsLb   Hematocrit 24.0 35.0 - 47.0 % L FrStHsLb    78 - 100 fl H FrStHsLb   MCH 40.0 26.5 - 33.0 pg H FrStHsLb   MCHC 32.5 31.5 - 36.5 g/dL  FrStHsLb   RDW 15.1 10.0 - 15.0 % H FrStHsLb   Platelet Count 221 150 - 450 10e9/L  FrStHsLb            INR [[id number]] (Abnormal)  Resulted: 02/17/18 1416, Result status: Final result    Ordering provider: Davian Sims MD   "02/17/18 1309 Resulting lab: [facility]    Specimen Information    Type Source Collected On   Blood  02/17/18 1340          Components       Value Reference Range Flag Lab   INR 2.83 0.86 - 1.14 H FrStHsLb            Hemoglobin [[id number]] (Abnormal)  Resulted: 02/17/18 0953, Result status: Final result    Ordering provider: Jonny Devlin MD  02/17/18 0838 Resulting lab: [facility]    Specimen Information    Type Source Collected On   Blood  02/17/18 0945          Components       Value Reference Range Flag Lab   Hemoglobin 8.8 11.7 - 15.7 g/dL L FrStHsLb            Testing Performed By     Lab - Abbreviation Name Director Address Valid Date Range    14 - FrStHsLb [facility] Unknown [address] [place] [address] 05/08/15 1057 - Present            Unresulted Labs (24h ago through future)    Start       Ordered    02/21/18 0600  Basic metabolic panel  AM DRAW,   Routine      02/20/18 0907    02/21/18 0600  CBC with platelets  AM DRAW,   Routine     Comments:  Last Lab Result: Hemoglobin (g/dL)       Date                     Value                 02/20/2018               8.2 (L)          ----------    02/20/18 0907    02/18/18 0600  INR  (warfarin (COUMADIN) Pharmacy Consult-INITIAL ORDER)  DAILY,   Routine      02/17/18 0142    Unscheduled  Potassium  (Potassium Replacement - \"Standard\" - For K levels less than 3.4 mmol/L - UU,UR,UA,RH,SH,PH,WY )  CONDITIONAL (SPECIFY),   Routine     Comments:  Obtain Potassium Level for these conditions:  *IF no potassium result within 24 hours before initiation of order set, draw potassium level with next lab collect.    *2 HOURS AFTER last IV potassium replacement dose and 4 hours after an oral replacement dose.  *Next morning after potassium dose.     Repeat Potassium Replacement if necessary.    02/17/18 1013    Unscheduled  Red blood cell prepare order unit conditional  (Conditional Red Blood Cells Unit)  CONDITIONAL " (SPECIFY) BLOOD,   Routine     Comments:  For patients with significant heart failure:  Recommend transfusing slowly using the 4 hour allowed time period, if clinically appropriate.  Do NOT change or add to this text.  Use additional instructions field.   Question Answer Comment   Irradiation Indication Not irradiated    Red Blood Cells: NON-Irradiated    Number of Units 1    When to transfuse Hemoglobin is 7 g/dL or less (anemia)    Special Requirements None    Infusion Duration Infuse within 4 hours of release        02/18/18 1140         Imaging Results - 3 Days      XR Lumbar Spine 2/3 Views [[id number]]  Resulted: 02/18/18 1331, Result status: Final result    Ordering provider: Davian Sims MD  02/18/18 1139 Resulted by: Tyron Vasquez MD    Performed: 02/18/18 1239 - 02/18/18 1253 Resulting lab: RADIOLOGY RESULTS    Narrative:       LUMBAR SPINE TWO VIEWS  2/18/2018 12:53 PM     HISTORY: Back pain following fall.    COMPARISON: None.      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 Scoliosis convex  to the left. No definite new fracture. Arterial calcifications. Vena  cava filter. Constipation.    TYRON VASQUEZ MD      XR Tibia & Fibula Left 2 Views [[id number]]  Resulted: 02/18/18 1331, Result status: Final result    Ordering provider: Karan Shaw MD  02/18/18 0811 Resulted by: Tyron Vasquez MD    Performed: 02/18/18 1230 - 02/18/18 1252 Resulting lab: RADIOLOGY RESULTS    Narrative:       LEFT TIBIA-FIBULA TWO VIEWS 2/18/2018 12:52 PM     HISTORY: Left leg laceration, evaluate for fracture.    COMPARISON: None.      Impression:       IMPRESSION: Osteopenia. Total knee arthroplasty. Arterial  calcifications. No evidence of fracture.    YTRON VASQUEZ MD      Testing Performed By     Lab - Abbreviation Name Director Address Valid Date Range    104 - Rad Rslts RADIOLOGY RESULTS Unknown Unknown 02/16/05 1553 - Present            Encounter-Level Documents:     There are  no encounter-level documents.      Order-Level Documents:     There are no order-level documents.

## 2018-02-16 NOTE — IP AVS SNAPSHOT
` `     [place] [address] [facility]: [phone]                 INTERAGENCY TRANSFER FORM - NOTES (H&P, Discharge Summary, Consults, Procedures, Therapies)   2018                    Hospital Admission Date: 2018  SIERRA CALABRESE   : 4/3/1928  Sex: Female        Patient PCP Information     Provider PCP Type    [facility]         History & Physicals      H&P by Jonny Devlin MD at 2018  1:34 AM     Author:  Jonny Devlin MD Service:  Hospitalist Author Type:  Physician    Filed:  2018  8:46 AM Date of Service:  2018  1:34 AM Creation Time:  2018  1:34 AM    Status:  Signed :  Jonny Devlin MD (Physician)         [facility]    History and Physical  Hospitalist       Date of Admission:  2018    Assessment & Plan   Sierra Calabrese is a 89 year old female who presents with[JW1.1] a witnessed fall at her [facility] facility suffering a large left knee laceration. Admitted for observation following blood loss related to large laceration    Mechanical fall with large left knee laceration[JW1.2]:[JW1.1] Patient fell after sliding out of a chair at [facility]. Aid was present at time of fall. Large left knee laceration which was irrigated and sutured in the emergency department  -Orthopedic surgery consulted for trauma evaluation given depth of wound over left patella. Discussed with ED provider regarding this.  -Acetaminophen  -Repeat CBC in a.m.  -Monitor for neurologic changes in the setting of fall with anticoagulations. Patient did not hit head or lose consciousness, fall was witnessed.    Cervicalgia: Present prior trauma  -Lidocaine patch     Atrial fibrillation:  -Warfarin, pharmacy to dose  -Continue metoprolol XL 50 mg b.i.d.    History of recurrent DVT: Patient with a history of DVT with recurrence following discontinuation of Coumadin  -Continue warfarin, pharmacy to dose    Lymphedema: Patient with  chronic lymphedema bilaterally   -Bumex 4 mg daily as per home regimen[JW1.2]    DVT Prophylaxis:[JW1.1] Ambulate every shift[JW1.2]    Code Status:[JW1.1] DNR / DNI[JW1.2]    Disposition: Expected discharge[JW1.1] likely 2/17/18 afternoon following trauma evaluation.[JW1.2]    [facility]    Primary Care Physician   [facility]    Chief Complaint[JW1.1]   Runny nose[JW1.2]    History is obtained from the patient, chart review[JW1.1], patient's aide from care facility at bedside, discussion with Dr. Norris in the emergency department . Patient is not able to provide much of a meaningful history. She knows she is in the hospital, though she is uncertain as to why.[JW1.2]      History of Present Illness   Antoinette Cowan is a 89 year old female who presents with[JW1.1] a witnessed fall at a care facility where patient slid forward out of a chair and sustained a large laceration overlying her left knee. Patient with reported 300 mL blood loss, wound was irrigated and sutured by Dr. Norris in the emergency department. Note ED note images of laceration with significant subcutaneous fat extrusion. Patellar Tendon was not exposed per report, though deep laceration. Patient with no complaints of leg pain, is unaware as to why she is in the emergency department currently.    Chronic lymphedema which is stable per aide at bedside. Fall was witnessed by an aide at the bedside, patient did not hit her head or lose consciousness.    She complains of having a runny nose, holds a tissue under her nose constantly. When asked if she has pain, patient states that she has some neck pain. Care assistant states that she has had this in the past as well, maybe slightly more prominent over the past 2 weeks, though present prior to fall.[JW1.2]    Past Medical History    I have reviewed this patient's medical history and updated it with pertinent information if needed.   Past Medical History:   Diagnosis Date     Anemia       Atrial fibrillation, new onset (H) 8/25/13     Breast cancer (H) 1997     Chronic pain      DVT (deep venous thrombosis) (H)      Hyperlipaemia      Hypertension      Hypothyroidism      Mixed hyperlipidemia      Thrombocytosis (H)        Past Surgical History   I have reviewed this patient's surgical history and updated it with pertinent information if needed.  Past Surgical History:   Procedure Laterality Date     JOINT REPLACEMENT  7/23/2009    left total knee replacement     KYPHOPLASTY       Igor Nitinol Filter         Prior to Admission Medications   Prior to Admission Medications   Prescriptions Last Dose Informant Patient Reported? Taking?   Bumetanide (BUMEX PO)  Daughter Yes No   Sig: Take 4 mg by mouth daily    CHOLECALCIFEROL  Daughter Yes No   Sig: Take 1,000 Units by mouth daily   Calcium Carbonate-Vitamin D (CALTRATE 600+D PO)  Daughter Yes No   Sig: Take 1 tablet by mouth daily    LEVOTHYROXINE SODIUM PO  Daughter Yes No   Sig: Take 50 mcg by mouth daily   Potassium Chloride Magdalene ER (K-DUR PO)  Daughter Yes No   Sig: Take 20 mEq by mouth daily   SIMVASTATIN PO  Daughter Yes No   Sig: Take 20 mg by mouth At Bedtime   acetaminophen (TYLENOL) 500 MG tablet   No No   Sig: Take 2 tablets (1,000 mg) by mouth every 8 hours   hydroxyurea (HYDREA) 500 MG capsule  Daughter Yes No   Sig: Take 1,000 mg by mouth daily    lidocaine (LIDODERM) 5 % Patch   No No   Sig: Apply up to 3 patches to painful area at once for up to 12 h within a 24 h period.  Remove after 12 hours.   metoprolol (TOPROL XL) 50 MG 24 hr tablet  Daughter Yes No   Sig: Take 50 mg by mouth 2 times daily   nystatin (MYCOSTATIN) 753548 UNIT/GM POWD  Daughter Yes No   Sig: Apply topically 2 times daily Under breasts   ondansetron (ZOFRAN ODT) 4 MG ODT tab   No No   Sig: Take 1-2 tablets (4-8 mg) by mouth 3 times daily (before meals)   oxyCODONE IR (ROXICODONE) 5 MG tablet   No No   Sig: Take 1 tablet (5 mg) by mouth every 4 hours as needed for  moderate to severe pain   polyethylene glycol (MIRALAX/GLYCOLAX) Packet  Daughter Yes No   Sig: Take 1 packet by mouth daily as needed for constipation   senna-docusate (SENOKOT-S;PERICOLACE) 8.6-50 MG per tablet   No No   Sig: Take 1 tablet by mouth 2 times daily as needed for constipation   warfarin (COUMADIN) 2 MG tablet  Daughter Yes No   Sig: Take 1 mg by mouth daily       Facility-Administered Medications: None     Allergies   Allergies   Allergen Reactions     Clindamycin Rash     Ciprofloxacin Hives     Vicodin [Hydrocodone-Acetaminophen] Rash       Social History   I have reviewed this patient's social history and updated it with pertinent information if needed. Antoinette Cowan  reports that she has never smoked. She does not have any smokeless tobacco history on file. She reports that she does not drink alcohol or use illicit drugs.    Family History   I have reviewed this patient's family history and updated it with pertinent information if needed.[JW1.1]   Father with CVA, heart disease and EtOH history[JW1.2]    Review of Systems[JW1.1]   Patient unable to provide a review of systems secondary to dementia[JW1.2]    Physical Exam   Temp: 97.5  F (36.4  C) Temp src: Temporal BP: 103/45   Heart Rate: 77   SpO2: 95 % O2 Device: None (Room air)    Vital Signs with Ranges  Temp:  [97.5  F (36.4  C)] 97.5  F (36.4  C)  Heart Rate:  [77] 77  BP: ()/(45-53) 103/45  SpO2:  [93 %-95 %] 95 %  0 lbs 0 oz    Constitutional: no acute distress, alert,[JW1.1] sitting up in bed[JW1.2]   Respiratory: breath sounds[JW1.1] with prolonged expiration phase, no significant wheezing, bibasilar crackles on inspiration.[JW1.2]  Cardiovascular: regular rate and rhythm, no murmur  GI: abdomen soft, non-tender, normoactive bowel sounds, no masses  Lymph/Hematologic:[JW1.1]3+ pitting bilateral lower extremity edema with overlying erythema bilaterally. Stable per care assistant at bedside[JW1.2]   Musculoskeletal:[JW1.1]Diffuse  muscular wasting associated with advanced age. Left knee currently wrapped, though note images from emergency department with large deep laceration.[JW1.2]   Neurologic:[JW1.1] Oriented to place, not to situation.[JW1.2]    Data   Data reviewed today:  I personally reviewed[JW1.1] no images or EKG's today[JW1.2].    Recent Labs  Lab 02/16/18  2112   WBC 4.4   HGB 9.3*   *      INR 2.87*       No results found for this or any previous visit (from the past 24 hour(s)).[JW1.1]     Revision History        User Key Date/Time User Provider Type Action    > JW1.2 2/17/2018  8:46 AM Jonny Devlin MD Physician Sign     JW1.1 2/17/2018  1:34 AM Jonny Devlin MD Physician                   Discharge Summaries     No notes of this type exist for this encounter.         Consult Notes      Consults by Karan Shaw MD at 2/17/2018  6:44 PM     Author:  Karan Shaw MD Service:  Orthopedics Author Type:  Physician    Filed:  2/18/2018  8:11 AM Date of Service:  2/17/2018  6:44 PM Creation Time:  2/17/2018  6:44 PM    Status:  Signed :  Karan Shaw MD (Physician)     Consult Orders:    1. Orthopedic Surgery IP Consult: Patient to be seen: Routine - within 24 hours; trauma eval; L peripatellar laceration; Consultant may enter orders: Yes [[id number]] ordered by Jonny Devlin MD at 02/17/18 0134                [facility] Orthopedic Consultation    Antoinette Cowan MRN# [id number]   Age: 89 year old YOB: 1928     Date of Admission:  2/16/2018    Reason for consult: LLE laceration       Requesting physician: Dr. Devlin       Level of consult: Consult, follow and place orders           Assessment and Plan:   Assessment:   LLE laceration, sewed up in ED  No evidence of bony injury, however, recommend XRs to r/o fracture      Plan:   - XR L tib/fib  - rest of recommendations pending XRs, if no fx, will recommend WBAT, PT/OT, abx per primary for laceration, pain control              Chief Complaint:[JM1.1]   L tibia laceration[JM1.2]         History of Present Illness:   This patient is a 89 year old female who presents with the following condition requiring a hospital admission:[JM1.1]    88 yo F who lives in [facility] care facility who had a fall on 2/16/18. She fell and had a large laceration over her L tibia with copious amount of blood loss. She was able to bear weight after and was taken to the ED. Wound was irrigated and sutured. No xrays were performed and pateint was admitted to the hospital for monitoring. Of note, patient is on coumadin. Denies any other injury. She is usually on oxygen. Patient also has known lymphedema. She denies any numbness/tingling/radiating signs. No associated injuries.[JM1.2]            Past Medical History:     Past Medical History:   Diagnosis Date     Anemia      Atrial fibrillation, new onset (H) 8/25/13     Breast cancer (H) 1997     Chronic pain      DVT (deep venous thrombosis) (H)      Hyperlipaemia      Hypertension      Hypothyroidism      Mixed hyperlipidemia      Thrombocytosis (H)              Past Surgical History:     Past Surgical History:   Procedure Laterality Date     JOINT REPLACEMENT  7/23/2009    left total knee replacement     KYPHOPLASTY       Igor Nitinol Filter               Social History:     Social History   Substance Use Topics     Smoking status: Never Smoker     Smokeless tobacco: Not on file     Alcohol use No             Family History:   No family history on file.          Immunizations:     VACCINE/DOSE   Diptheria   DPT   DTAP   HBIG   Hepatitis A   Hepatitis B   HIB   Influenza   Measles   Meningococcal   MMR   Mumps   Pneumococcal   Polio   Rubella   Small Pox   TDAP   Varicella   Zoster             Allergies:     Allergies   Allergen Reactions     Clindamycin Rash     Ciprofloxacin Hives     Vicodin [Hydrocodone-Acetaminophen] Rash             Medications:     Current Facility-Administered Medications   Medication      acetaminophen (TYLENOL) tablet 1,000 mg     levothyroxine (SYNTHROID/LEVOTHROID) tablet 50 mcg     Warfarin Therapy Reminder (Check START DATE - warfarin may be starting in the FUTURE)     acetaminophen (TYLENOL) tablet 650 mg     acetaminophen (TYLENOL) Suppository 650 mg     naloxone (NARCAN) injection 0.1-0.4 mg     melatonin tablet 1 mg     ondansetron (ZOFRAN-ODT) ODT tab 4 mg    Or     ondansetron (ZOFRAN) injection 4 mg     lidocaine 1 % 1 mL     lidocaine (LMX4) cream     sodium chloride (PF) 0.9% PF flush 3 mL     sodium chloride (PF) 0.9% PF flush 3 mL     senna-docusate (SENOKOT-S;PERICOLACE) 8.6-50 MG per tablet 1 tablet    Or     senna-docusate (SENOKOT-S;PERICOLACE) 8.6-50 MG per tablet 2 tablet     polyethylene glycol (MIRALAX/GLYCOLAX) Packet 17 g     bisacodyl (DULCOLAX) Suppository 10 mg     prochlorperazine (COMPAZINE) injection 5 mg    Or     prochlorperazine (COMPAZINE) tablet 5 mg    Or     prochlorperazine (COMPAZINE) Suppository 12.5 mg     Lidocaine (LIDOCARE) 4 % Patch 2 patch     lidocaine patch REMOVAL     lidocaine patch in PLACE     potassium chloride SA (K-DUR/KLOR-CON M) CR tablet 20-40 mEq     potassium chloride (KLOR-CON) Packet 20-40 mEq     potassium chloride 10 mEq in 100 mL sterile water intermittent infusion (premix)     potassium chloride 10 mEq in 100 mL intermittent infusion with 10 mg lidocaine     potassium chloride 20 mEq in 50 mL intermittent infusion     metoprolol succinate (TOPROL-XL) 24 hr tablet 25 mg             Review of Systems:[JM1.1]   All review of systems was performed and was negative except as per hpi[JM1.2]         Physical Exam:   All vitals have been reviewed  Patient Vitals for the past 24 hrs:   BP Temp Temp src Pulse Heart Rate Resp SpO2   02/17/18 1614 96/53 98.1  F (36.7  C) Oral 57 62 16 100 %   02/17/18 0726 97/44 97.6  F (36.4  C) Temporal - 49 18 95 %   02/17/18 0149 95/45 97.6  F (36.4  C) Temporal 60 54 18 99 %   02/17/18 0048 - - - - - - 95 %    02/17/18 0047 103/45 - - - - - 93 %   02/16/18 2054 96/53 97.5  F (36.4  C) Temporal - 77 - 93 %       Intake/Output Summary (Last 24 hours) at 02/17/18 1844  Last data filed at 02/17/18 1616   Gross per 24 hour   Intake              160 ml   Output                0 ml   Net              160 ml[JM1.1]     NAD  nonlabored breathing on oxygen  +peripheral edema and lymphedema  Skin with laceration that is sewn up over the left distal tib/fib  White sclera  LLE:  Laceration with sutures in place  Mild ttp  +Df/PF/EHL  Sensation intact throughout  +lymphedema[JM1.2]            Data:   All laboratory data reviewed  Results for orders placed or performed during the hospital encounter of 02/16/18   INR   Result Value Ref Range    INR 2.87 (H) 0.86 - 1.14   CBC with platelets differential   Result Value Ref Range    WBC 4.4 4.0 - 11.0 10e9/L    RBC Count 2.31 (L) 3.8 - 5.2 10e12/L    Hemoglobin 9.3 (L) 11.7 - 15.7 g/dL    Hematocrit 28.9 (L) 35.0 - 47.0 %     (H) 78 - 100 fl    MCH 40.3 (H) 26.5 - 33.0 pg    MCHC 32.2 31.5 - 36.5 g/dL    RDW 15.3 (H) 10.0 - 15.0 %    Platelet Count 262 150 - 450 10e9/L    Diff Method Automated Method     % Neutrophils 65.7 %    % Lymphocytes 21.1 %    % Monocytes 5.9 %    % Eosinophils 5.9 %    % Basophils 0.9 %    % Immature Granulocytes 0.5 %    Nucleated RBCs 1 (H) 0 /100    Absolute Neutrophil 2.9 1.6 - 8.3 10e9/L    Absolute Lymphocytes 0.9 0.8 - 5.3 10e9/L    Absolute Monocytes 0.3 0.0 - 1.3 10e9/L    Absolute Eosinophils 0.3 0.0 - 0.7 10e9/L    Absolute Basophils 0.0 0.0 - 0.2 10e9/L    Abs Immature Granulocytes 0.0 0 - 0.4 10e9/L    Absolute Nucleated RBC 0.0    Hemoglobin   Result Value Ref Range    Hemoglobin 8.8 (L) 11.7 - 15.7 g/dL   INR   Result Value Ref Range    INR 2.83 (H) 0.86 - 1.14[JM1.1]     Time spent on consult: 30 minutes[JM1.2]     Karan Shaw MD[JM1.1]          Revision History        User Key Date/Time User Provider Type Action    > JM1.2 2/18/2018  8:11  AM Karan Shaw MD Physician Sign     JM1.1 2/17/2018  6:44 PM Karan Shaw MD Physician                      Progress Notes - Physician (Notes from 02/17/18 through 02/20/18)      Progress Notes by Ruth Nolasco PT at 2/20/2018  2:00 PM     Author:  Ruth Nolasco PT Service:  (none) Author Type:  Physical Therapist    Filed:  2/20/2018  3:35 PM Date of Service:  2/20/2018  2:00 PM Creation Time:  2/20/2018  3:35 PM    Status:  Signed :  Ruth Nolasco PT (Physical Therapist)            02/20/18 1400   General Information   Discipline PT   Onset of Edema 09/01/13   Affected Body Part(s) Left LE;Right LE   Edema Etiology Unknown   Pertinent history of current problem (PT: include personal factors and/or comorbidities that impact the POC; OT: include additional occupational profile info) Chronic problem per pt's daughter, has had compression wrapping in the past, not recently. LE Lymphedema, sees home therapist for lymph care, having special garment made (velcro, to be available in approx 1 week)   Edema Precaution Comments pt with laceration, per discussion with WOC RN pt appropriate for compression wrapping over top once wound cares completed with lesser compression at that site for tolerance   Pain   Patient currently in pain No   Pain comments pt denies   Edema Examination / Assessment   Skin Condition Pitting;Dryness;Other (see comments)   Skin Condition Comments R LE intact, L LE with wound, see ortho and WOC notes for wound /laceration condition   Scar Yes   Location TKA scar to L LE, reports >10 years old   Mobility no concerns   Scar Comments well healed   Ulcerations No   Stemmer Sign Positive   ROM   Range of Motion (WFL) other (describe)   Description of Range of Motion Deficits see PT eval   Strength   Strength (WFL) other (describe)   Description of Strength Deficits see PT eval   Sensation   Sensation (WFL) no deficits were identified  (pt denies numbness/tingling to B LEs)    Assessment/Plan   Patient presents with Stage 2 Lymphedema   Assessment pt with 2+pitting edema to R LE, 3+ on R dorsum of foot, 3+ pitting to L LE throughout, 2+ pitting edema to B thighs, no noted increased firmness or edema noted to pt's abdomen. Pt's skin is dry, R LE is intact, L LE has laceration and is covered at this time. Hemosiderin staining noted > to R LE. L TKA scar well healed present >10 years old (mature)   Clinical Presentation Stable/Uncomplicated   Clinical Presentation Rationale chronic baseline edema, wraps recently initiated at baseline, prior to admission HHOT invovled and measured pt for compression garment (velcro)   Clinical Decision Making (Complexity) Low complexity   Planned Edema Interventions Gradient compression bandaging;Exercises;Education   Treatment Frequency daily   Treatment Duration 1 day  (handed off to nursing to start cares 2/21/18)   Patient, Family and/or Staff in agreement with plan of care. Yes   Risks and benefits of treatment have been explained. Yes   Total Evaluation Time   Total Evaluation Time (Minutes) 5[SQ1.1]        Revision History        User Key Date/Time User Provider Type Action    > SQ1.1 2/20/2018  3:35 PM Ruth Nolasco, PT Physical Therapist Sign            Progress Notes by Ismael Jackson RN at 2/20/2018  1:53 PM     Author:  Ismael Jackson RN Service:  [facility] Nurse Author Type:  Registered Nurse    Filed:  2/20/2018  2:09 PM Date of Service:  2/20/2018  1:53 PM Creation Time:  2/20/2018  1:53 PM    Status:  Addendum :  Ismael Jackson RN (Registered Nurse)         [facility] Nurse Inpatient Wound Assessment     Initial Assessment of wound(s) on pt's:   Left shin-[ZP1.1] anterior[ZP1.2] to lateral        Data:   Patient History:      per MD note(s): 89 year old female with PMHx significant for dementia, chronic atrial fibrillation, breast cancer, severe pulmonary HTN with 2-3+ TR, lower extremity lymphedema,  DVT, DLP, HTN, CKD stage 3 (baseline cr 1-1.2), hypothyroidism, and thrombocytosis who presented with a witnessed fall at her memory care facility suffering a large left shin skin laceration. She was admitted for further management.     Jaydon Assessment and sub scores:   Jaydon Score  Avg: 15.6  Min: 15  Max: 16    Positioning: Pillows,     Mattress:  Standard , Atmos Air mattress    Moisture Management:  dressings    Current Diet / Nutrition:           Active Diet Order      Regular Diet Adult    Labs:   Recent Labs   Lab Test  02/20/18   0550   02/18/18   0610   09/10/17   1450   ALBUMIN   --    --    --    --   3.5   HGB  8.2*   < >  7.8*   < >  13.3   INR  2.89*   < >  2.93*   < >  2.49*   WBC   --    --   5.1   < >  3.7*    < > = values in this interval not displayed.       Wound Assessment (location):   Left shin,[ZP1.1] anterior[ZP1.2] to lateral  Wound History:  Pt fell at home (memory care facility) and injured her left LE  Large wound with most of wound covered by approximated skin flap(s) that are sutured into place.  There  Is a half moon shaped line of sutures along bottom of flap and another suture line traveling vertically from base suture line.  All is approximated other than a 3cm x 1.4cm area of exposed dried, red to blackish tissue.  Entire wound is large, about 12cm x 8cm.  Painful to light touch.  Moderate serosanguinous drainage.  Tissue, including flaps and periwound tissue is mostly purplish bruised looking.              Intervention:     Patient's chart evaluated.      Wound(s) assessed with the daughter as noted above    Orders  Written  Supplies  Reviewed, gathered and placed at bedside, discussed with RN, discussed with RN ordering MicroKlenz Spray and Aquacel Ag    Discussed plan of care with Patient, Family and Nurse          Assessment:       Large traumatic wound on the left anterior to lateral shin, clean without obvious s/s of infection but with moderate drainage.  Will use Aquacel  Ag to protect from infection and help manage drainage.  Pt with significant lymphedema in bilateral LE, has been getting compression to bilateral LE prior to admission, will continue with compression to promote healing.         Plan:     Nursing to notify the Provider(s) and re-consult the [facility] Nurse if wound(s) deteriorate(s) or if the wound care plan needs reevaluation.    Plan of care for wound located on left LE: daily    Carefully remove old dressings.  If they stick then wet prior to removal- don't tear them off    1. Clean both legs and feet with gentle soap and water, getting between the toes    2. Apply lotion from toes to knees, not between toes.    3. Clean wound on left anterior/ lateral shin with MicroKlenz Spray, pat dry    4.  Paint the entire area, including and especially the wound and suture line, with No Sting Skin Prep- this will help protect the skin from moisture and drainage and help keep sutures together until wound heals more.    5. Cover wounds/ suture lines with Aquacel Ag- cut wide strips as needed.    6. Lightly mist the Aquacel Ag with MicroKlenz Spray in areas where there is no drainage.  If drainage then leave Aquacel dry to better absorb drainage.    7. Cover with ABD    8. Secure with Ana wrap, time and date    9.  Compression as ordered- making sure to time and date the tape when wraps applied    10.  Keep heels elevated at all times    - consider stopping Aquacel Ag dressings when no more drainage, can just cover sutures with an ABD so they don't snag on the wraps.     WO Nurse will return: weekly and prn[ZP1.1]          Revision History        User Key Date/Time User Provider Type Action    > ZP1.2 2/20/2018  2:09 PM Ismael Jackson RN Registered Nurse Addend     ZP1.1 2/20/2018  2:07 PM Ismael Jackson RN Registered Nurse Sign            Progress Notes by Jazzmine Lozano RN at 2/20/2018 11:17 AM     Author:  Jazzmine Lozano RN Service:  (none) Author Type:  Registered  Nurse    Filed:  2018 11:19 AM Date of Service:  2018 11:17 AM Creation Time:  2018 11:17 AM    Status:  Signed :  Jazzmine Lozano RN (Registered Nurse)         [facility]  Patient is currently open to home care services with [facility].  The patient is currently receiving OT Lymphedema services (was discharged from skilled nursing on 2/15/18).  [facility]  and team have been notified of patient admission.  [facility] liaison will continue to follow patient during stay.  If appropriate provide orders to resume home care at time of discharge. Please note, patient would need new orders for RN or PT services as OT lymphedema specialist cannot perform resumption of care visit.[SH1.1]          Revision History        User Key Date/Time User Provider Type Action    > SH1.1 2018 11:19 AM Jazzmine Lozano RN Registered Nurse Sign            Progress Notes by Kalani Brandt PA-C at 2018  8:56 AM     Author:  Kalani Brandt PA-C Service:  Orthopedics Author Type:  Physician Assistant - C    Filed:  2018  9:52 AM Date of Service:  2018  8:56 AM Creation Time:  2018  8:56 AM    Status:  Signed :  Kalani Brandt PA-C (Physician Assistant - C)         Orthopedic Surgery  Antoinette Wheelermoo  2018  Admit Date:  2018[EC1.1]  LLE laceration with repair in ED     Patient resting comfortably in chair.    Pain controlled.    Tolerating oral intake.   Denies nausea or vomiting  Denies chest pain or SOB   No events overnight.      Alert and orient to person, place, and time.[EC1.2]  Vital Sign Ranges  Temperature Temp  Av.3  F (36.3  C)  Min: 97.2  F (36.2  C)  Max: 97.6  F (36.4  C)   Blood pressure Systolic (24hrs), Av , Min:98 , Max:114        Diastolic (24hrs), Av, Min:41, Max:58      Pulse Pulse  Av  Min: 51  Max: 63   Respirations Resp  Av  Min: 16  Max: 16   Pulse oximetry SpO2  Av %  Min: 89 %  Max: 98  %[EC1.1]       ACE wrap in place, remained in place as wound care will be evaluating today  +ttp over laceration  +DF/PF/EHL  +lymphedema bilaterally  +sensation intact throughout[EC1.2]    Labs:  Recent Labs   Lab Test  02/20/18   0550  02/19/18   0618  02/18/18   0610   POTASSIUM  3.5  3.4  3.7     Recent Labs   Lab Test  02/20/18   0550  02/19/18   1838  02/19/18   0618   HGB  8.2*  8.4*  8.4*     Recent Labs   Lab Test  02/20/18   0550  02/19/18   0618  02/18/18   0610   INR  2.89*  2.88*  2.93*     Recent Labs   Lab Test  02/18/18   0610  02/16/18   2112  12/17/17   0620   PLT  221  262  156[EC1.1]       A/P  1. Plan                        Mobilize with PT/OT                         WBAT                         Continue current pain regiment.                        Follow-up with PCP for suture removal 2 weeks post repair     2. Disposition                        Anticipate d/c to assisted living facility when cleared per medicine, ok per ortho.[EC1.2]    Kalani Brandt PA-C[EC1.1]     Revision History        User Key Date/Time User Provider Type Action    > EC1.2 2/20/2018  9:52 AM Kalani Brandt PA-C Physician Assistant - MARIE Sign     EC1.1 2/20/2018  8:56 AM Kalani Brandt PA-C Physician Assistant - C             Progress Notes by Davian Sims MD at 2/20/2018  9:02 AM     Author:  Davian Sims MD Service:  Hospitalist Author Type:  Physician    Filed:  2/20/2018  9:23 AM Date of Service:  2/20/2018  9:02 AM Creation Time:  2/20/2018  9:02 AM    Status:  Signed :  Davian Sims MD (Physician)         [facility]  Hospitalist Progress Note[MA1.1]  Davian Sims MD    Assessment & Plan[MA1.2]   Antoinette Cowan is a 89 year old female with PMHx significant for dementia, chronic atrial fibrillation, breast cancer, severe pulmonary HTN 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 She was admitted for further management.     Mechanical fall with large left anterior shin skin laceration/wound.  Patient fell after sliding out of a chair at [facility]. Aide was present at time of fall. Large left shin laceration which was irrigated and sutured in the emergency department. X-ray tibia/fibula as well as lumbar spine negative for acute fratures.    -Orthopedic surgery consulted and recommended WBAT  -Wound care input pending  -Empiric antibiotic therapy with Keflex  -Acetaminophen prn  -Fall precautions  -Continue PT/OT assessment  -Need to remove sutures 2 weeks post repair    Acute blood loss anemia, secondary to skin laceration.[MA1.1]    Recent Labs  Lab 02/20/18  0550 02/19/18  1838 02/19/18  0618 02/18/18  1915 02/18/18  0610 02/17/18  0945 02/16/18  2112   HGB 8.2* 8.4* 8.4* 8.7* 7.8* 8.8* 9.3*[MA1.2]     -Continue to monitor Hgb   -Transfusion prn for Hgb<7 (consent obtained from daughter)    NYDIA on CKD stage 3.  Baseline cr 1-1.2. Cr 1.03 upon admission, increased to 1.91 on 02/18. FeNa 0.3%. U/A unremarkable. Suspect pre-renal azotemia given low FENa.  Improved with IVF.[MA1.1]    Recent Labs  Lab 02/20/18  0550 02/19/18  2240 02/19/18  0618 02/18/18  0610   CR 1.25* 1.32* 1.50* 1.91*[MA1.2]       -D/C IVF 02/20  -Continue to hold PTA Bumex  -Continue to monitor renal function and electrolytes  -Continue to monitor I/O  -Avoid NSAIDs and other nephrotoxins     Cervicalgia:   Present prior to trauma.  -Lidocaine patch     HTN,  Bradycardia.  BP is running soft and HR was around 50-60 on hospital day 1.  [PTA metoprolol XL 50 mg po bid and Bumex 4 mg po bid]  -Decreased PTA metoprolol to 25 mg po bid   -Continue to hold PTA Bumex  -Continue to monitor BP    Hypothyroidism.  -Continue PTA levothyroxine     Chronic atrial fibrillation.  Rate is  controlled.  -Warfarin, pharmacy to dose  -Continue metoprolol XL at reduced dose of 25 mg b.i.d with hold parameters (dose decreased as above for soft BP and bradycardia)  -Telemetry     History of recurrent DVT.  Patient with a history of DVT with recurrence following discontinuation of Coumadin  -Continue warfarin, pharmacy to dose     Chronic lower extremity lymphedema.  -Continue to hold PTA Bumex 4 mg bid due to soft blood pressures/ NYDIA  -Leg wraps  -Keeps legs elevated  -Lymphedema therapy consult    Dementia.  At baseline.    # Pain Assessment:[MA1.1]   Current Pain Score 2/19/2018 2/19/2018 2/19/2018   Patient currently in pain? sleeping: patient not able to self report yes sleeping: patient not able to self report   Pain score (0-10) - 6 -   Pain location - Back -   Pain descriptors - - -[MA1.2]   - Antoinette is unable to participate in a collaborative plan for pain management due to dementia.   - Please see the plan for pain management as documented above  - Discussed with daughter[MA1.1]      Active Diet Order      Regular Diet Adult[MA1.2]      DVT Prophylaxis: Warfarin  Code Status: DNR / DNI  Disposition: Expected discharge back to her memory care unit on 02/21.    D/W RN, care coordinator, and ortho.  D/W daughter at extent 02/20.[MA1.1]    Interval History[MA1.2]   Patient reports no pain at rest. SBP is around 100. Left leg wound is dressed. No new event overnight.    Data reviewed today: I reviewed all new labs and imaging over the last 24 hours.[MA1.1]    Physical Exam   Temp: 97.3  F (36.3  C) Temp src: Oral BP: 98/58 Pulse: 63 Heart Rate: 56 Resp: 16 SpO2: 98 % O2 Device: Nasal cannula Oxygen Delivery: 1 LPM  There were no vitals filed for this visit.[MA1.2]  Vital Signs with Ranges[MA1.1]  Temp:  [97.2  F (36.2  C)-97.6  F (36.4  C)] 97.3  F (36.3  C)  Pulse:  [51-63] 63  Heart Rate:  [52-56] 56  Resp:  [16] 16  BP: ()/(41-58) 98/58  SpO2:  [93 %-98 %] 98 %[MA1.2]  I/O's Last 24  hours[MA1.1]  I/O last 3 completed shifts:  In: 2864 [P.O.:1080; I.V.:1784]  Out: 575 [Urine:575][MA1.2]    Constitutional: Comfortable, no acute distress  HEENT: No conjunctival pallor.  Neurologic: Awake, alert, oriented x1. Hard of hearing. No focal weakness  Neck: Supple, no elevated JVP  Respiratory: Clear to auscultation on ant chest exam  Cardiovascular: Normal S1 and S2. Irregularly irregular rhythm, slow heart rate  GI: Abdomen soft, non tender, non distended  Extremities: No calf tenderness, 2-3+ B/L LE edema  Skin/integument: Left knee skin wound is dressed.[MA1.1]    Medications[MA1.2]   All medications were reviewed.[MA1.1]    sodium chloride 75 mL/hr at 02/19/18 1840     Warfarin Therapy Reminder         warfarin  1 mg Oral ONCE at 18:00     cephaleXin  500 mg Oral TID     acetaminophen  1,000 mg Oral Q8H     levothyroxine (SYNTHROID/LEVOTHROID) tablet 50 mcg  50 mcg Oral Daily     sodium chloride (PF)  3 mL Intracatheter Q8H     lidocaine  2 patch Transdermal Q24H     lidocaine   Transdermal Q24h     lidocaine   Transdermal Q8H     metoprolol succinate  25 mg Oral BID        Data     Recent Labs  Lab 02/20/18  0550 02/19/18  2240 02/19/18  1838 02/19/18  0618  02/18/18  0610  02/16/18  2112   WBC  --   --   --   --   --  5.1  --  4.4   HGB 8.2*  --  8.4* 8.4*  < > 7.8*  < > 9.3*   MCV  --   --   --   --   --  123*  --  125*   PLT  --   --   --   --   --  221  --  262   INR 2.89*  --   --  2.88*  --  2.93*  < > 2.87*    138  --  137  --  137  < >  --    POTASSIUM 3.5  --   --  3.4  --  3.7  --   --    CHLORIDE 102  --   --  100  --  98  --   --    CO2 30  --   --  30  --  31  --   --    BUN 38*  --   --  44*  --  49*  --   --    CR 1.25* 1.32*  --  1.50*  --  1.91*  < >  --    ANIONGAP 7  --   --  7  --  8  --   --    KACEY 8.3*  --   --  8.3*  --  8.4*  --   --    *  --   --  126*  --  127*  --   --    < > = values in this interval not displayed.    No results found for this or any previous  visit (from the past 24 hour(s)).[MA1.2]     Revision History        User Key Date/Time User Provider Type Action    > MA1.2 2018  9:23 AM Davian Sims MD Physician Sign     MA1.1 2018  9:02 AM Davian Sims MD Physician             Progress Notes by Ismael Jackson RN at 2018  1:53 PM     Author:  Ismael Jackson RN Service:  [facility] Nurse Author Type:  Registered Nurse    Filed:  2018  1:56 PM Date of Service:  2018  1:53 PM Creation Time:  2018  1:53 PM    Status:  Signed :  Ismael Jackson RN (Registered Nurse)         D:  Left shin wound sutured by ortho at bedside earlier today.  Wound currently wrapped around gaiter area with a ACE wrap.  Pt in the middle of PT w lift, gait belt and 2 therapists.  I/P:  Discussed plan with daughter and nurse.  Will defer my assessment until tomorrow as wound was just addressed by ortho and am unable to see due to in PT.  Daughter wanted me to take a look so will return tomorrow to reassess and reassure[ZP1.1]     Revision History        User Key Date/Time User Provider Type Action    > ZP1.1 2018  1:56 PM Ismael Jackson RN Registered Nurse Sign            Progress Notes by Kalani Brandt PA-C at 2018 10:18 AM     Author:  Kalani Brandt PA-C Service:  Orthopedics Author Type:  Physician Assistant - C    Filed:  2018 12:25 PM Date of Service:  2018 10:18 AM Creation Time:  2018 10:18 AM    Status:  Signed :  Kalani Brandt PA-C (Physician Assistant - C)         Orthopedic Surgery  Antoinette Cowan  2018  Admit Date:  2018[EC1.1]  LLE laceration with repair in ED[EC1.2]    Patient[EC1.1] resting comfortably in chair[EC1.2].    Pain controlled.    Tolerating oral intake.[EC1.1]   Denies nausea or vomiting  Denies chest pain or SOB[EC1.2]   No events overnight.     Alert and orient to person, place, and time.  Vital Sign Ranges  Temperature Temp  Av.3  F  (36.3  C)  Min: 97.2  F (36.2  C)  Max: 97.3  F (36.3  C)   Blood pressure Systolic (24hrs), Av , Min:96 , Max:140        Diastolic (24hrs), Av, Min:41, Max:97      Pulse Pulse  Av.5  Min: 63  Max: 102   Respirations Resp  Av.2  Min: 16  Max: 18   Pulse oximetry SpO2  Av.7 %  Min: 92 %  Max: 94 %[EC1.1]       ACE wrap in place  Sutures intact, small amount of bleeding from scab with dressing removed  +ttp over laceration  +DF/PF/EHL  +lymphedema bilaterally  +sensation intact throughout[EC1.2]    Labs:  Recent Labs   Lab Test  18   0618  18   0610  17   0620   POTASSIUM  3.4  3.7  3.9     Recent Labs   Lab Test  18   0618  18   1915  18   0610   HGB  8.4*  8.7*  7.8*     Recent Labs   Lab Test  18   0618  18   0610  18   1340   INR  2.88*  2.93*  2.83*     Recent Labs   Lab Test  18   0610  18   2112  17   0620   PLT  221  262  156       A/P  1.[EC1.1] Plan[EC1.2]   Mobilize with PT/OT[EC1.1]    WBAT[EC1.2]    Continue current pain regiment.[EC1.1]   Follow-up with PCP for suture removal 2 weeks post repair[EC1.2]    2. Disposition   Anticipate d/c to[EC1.1] assisted living facility when cleared per medicine, ok per ortho[EC1.2].    Kalani Brandt PA-C[EC1.1]     Revision History        User Key Date/Time User Provider Type Action    > EC1.2 2018 12:25 PM Kalani Brandt PA-C Physician Assistant - MARIE Sign     EC1.1 2018 10:18 AM Kalani Brandt PA-C Physician Assistant - C             Progress Notes by Davian Sims MD at 2018  8:20 AM     Author:  Davian Sims MD Service:  Hospitalist Author Type:  Physician    Filed:  2018  8:41 AM Date of Service:  2018  8:20 AM Creation Time:  2018  8:20 AM    Status:  Signed :  Davian Sims MD (Physician)         [facility]  Hospitalist Progress Note  Davian Sims MD    Assessment & Plan    Antoinette Cowan is a 89 year old female with PMHx significant for dementia, chronic atrial fibrillation, breast cancer, severe pulmonary HTN 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 She was admitted for further management.     Mechanical fall with large left anterior shin skin laceration/wound.  Patient fell after sliding out of a chair at [facility]. Aide was present at time of fall. Large left shin laceration which was irrigated and sutured in the emergency department. X-ray tibia/fibula as well as lumbar spine negative for acute fratures.    -Orthopedic surgery consulted and recommended WBAT  -Wound care consult  -Empiric antibiotic therapy with Keflex  -Acetaminophen prn  -Fall precautions  -Continue PT/OT assessment  -Need to remove sutures in 10 days after injury    Acute blood loss anemia, secondary to skin laceration.    Recent Labs  Lab 02/19/18  0618 02/18/18  1915 02/18/18  0610 02/17/18  0945 02/16/18  2112   HGB 8.4* 8.7* 7.8* 8.8* 9.3*     -Continue to monitor Hgb every 12 hrs  -Transfusion prn for Hgb<7 (consent obtained from daughter)    NYDIA on CKD stage 3.  Baseline cr 1-1.2. Cr 1.03 upon admission, increased to 1.91 on 02/18. Suspect pre-renal azotemia versus ATN due to low normal BP.  Improving with IVF.[MA1.1]    Recent Labs  Lab 02/19/18  0618 02/18/18  0610   CR 1.50* 1.91*[MA1.2]     -Follow up on FENa and UA  -Continue IV NS at 75 ml/hr  -Continue to hold PTA Bumex  -Continue to monitor renal function and electrolytes  -Continue to monitor I/O  -Avoid NSAIDs and other nephrotoxins     Cervicalgia:   Present prior to trauma.  -Lidocaine patch     HTN,  Bradycardia.  BP was running soft and HR was around 50-60 on hospital day 1.  [PTA metoprolol XL 50 mg po bid and Bumex 4 mg po  bid]  -Decreased PTA metoprolol to 25 mg po bid   -Continue to hold PTA Bumex  -Continue to monitor BP    Hypothyroidism.  -Continue PTA levothyroxine     Chronic atrial fibrillation.  Rate is controlled.  -Warfarin, pharmacy to dose  -Continue metoprolol XL at reduced dose of 25 mg b.i.d with hold parameters (dose decreased as above for soft BP and bradycardia)  -Telemetry     History of recurrent DVT.  Patient with a history of DVT with recurrence following discontinuation of Coumadin  -Continue warfarin, pharmacy to dose     Lymphedema.  Patient with chronic lymphedema bilaterally   -Continue to hold PTA Bumex 4 mg bid due to soft blood pressures/ NYDIA    Dementia.  At baseline.    # Pain Assessment:   Current Pain Score 2/19/2018 2/18/2018 2/18/2018   Patient currently in pain? denies yes yes   Pain score (0-10) - 8 5   Pain location - Back Back   Pain descriptors - - -   - Antoinette is unable to participate in a collaborative plan for pain management due to dementia.   - Please see the plan for pain management as documented above  - Discussed with daughter      Active Diet Order      Regular Diet Adult      DVT Prophylaxis: Warfarin  Code Status: DNR / DNI  Disposition: Expected discharge in[MA1.1] 1-2[MA1.3] days pending improvement in renal function.    D/W RN[MA1.1] and ortho.[MA1.3]  D/W daughter at extent 02/1[MA1.1]9[MA1.3].    Interval History   Pain co[MA1.1]ntinues to complain[MA1.3] of low back pain. BP i[MA1.1]s more stable.[MA1.3] Left leg wound is dressed. No new event overnight.    Data reviewed today: I reviewed all new labs and imaging over the last 24 hours.    Physical Exam   Temp: (P) 97.3  F (36.3  C) Temp src: (P) Oral BP: (P) 103/53 Pulse: (P) 63 Heart Rate: 64 Resp: (P) 18 SpO2: (P) 92 % O2 Device: (P) None (Room air) Oxygen Delivery: 2 LPM  There were no vitals filed for this visit.  Vital Signs with Ranges  Temp:  [97.3  F (36.3  C)] (P) 97.3  F (36.3  C)  Pulse:  [63-66] (P) 63  Heart Rate:   [49-64] 64  Resp:  [16-18] (P) 18  BP: ()/(41-54) (P) 103/53  SpO2:  [92 %-94 %] (P) 92 %  I/O's Last 24 hours  I/O last 3 completed shifts:  In: 1941 [P.O.:258; I.V.:1683]  Out: 400 [Urine:400]    Constitutional: Comfortable, no acute distress  HEENT: No conjunctival pallor.  Neurologic: Awake, alert, oriented x1. Hard of hearing. No focal weakness  Neck: Supple, no elevated JVP  Respiratory: Clear to auscultation on ant chest exam  Cardiovascular: Normal S1 and S2. Irregularly irregular rhythm, slow heart rate  GI: Abdomen soft, non tender, non distended  Extremities: No calf tenderness, 3+ B/L LE edema  Skin/integument: Left knee skin wound is dressed.    Medications   All medications were reviewed.    sodium chloride 100 mL/hr at 02/19/18 0642     Warfarin Therapy Reminder         cephaleXin  500 mg Oral TID     acetaminophen  1,000 mg Oral Q8H     levothyroxine (SYNTHROID/LEVOTHROID) tablet 50 mcg  50 mcg Oral Daily     sodium chloride (PF)  3 mL Intracatheter Q8H     lidocaine  2 patch Transdermal Q24H     lidocaine   Transdermal Q24h     lidocaine   Transdermal Q8H     metoprolol succinate  25 mg Oral BID        Data     Recent Labs  Lab 02/19/18  0618 02/18/18  1915 02/18/18  0610 02/17/18  1340  02/16/18  2112   WBC  --   --  5.1  --   --  4.4   HGB 8.4* 8.7* 7.8*  --   < > 9.3*   MCV  --   --  123*  --   --  125*   PLT  --   --  221  --   --  262   INR 2.88*  --  2.93* 2.83*  --  2.87*     --  137  --   --   --    POTASSIUM 3.4  --  3.7  --   --   --    CHLORIDE 100  --  98  --   --   --    CO2 30  --  31  --   --   --    BUN 44*  --  49*  --   --   --    CR 1.50*  --  1.91*  --   --   --    ANIONGAP 7  --  8  --   --   --    KACEY 8.3*  --  8.4*  --   --   --    *  --  127*  --   --   --    < > = values in this interval not displayed.    Recent Results (from the past 24 hour(s))   XR Tibia & Fibula Left 2 Views    Narrative    LEFT TIBIA-FIBULA TWO VIEWS 2/18/2018 12:52 PM     HISTORY:  Left leg laceration, evaluate for fracture.    COMPARISON: None.      Impression    IMPRESSION: Osteopenia. Total knee arthroplasty. Arterial  calcifications. No evidence of fracture.    NYLA VASQUEZ MD   XR Lumbar Spine 2/3 Views    Narrative    LUMBAR SPINE TWO VIEWS  2/18/2018 12:53 PM     HISTORY: Back pain following fall.    COMPARISON: None.      Impression    IMPRESSION:  Previous vertebroplasties at T12 and L1. Scoliosis convex  to the left. No definite new fracture. Arterial calcifications. Vena  cava filter. Constipation.    NYLA VASQUEZ MD[MA1.1]          Revision History        User Key Date/Time User Provider Type Action    > MA1.3 2/19/2018  8:41 AM Davian Sims MD Physician Sign     MA1.2 2/19/2018  8:23 AM Davian Sims MD Physician      MA1.1 2/19/2018  8:20 AM Davian Sims MD Physician             Progress Notes by Chelsi Jacobo PT at 2/18/2018  4:36 PM     Author:  Chelsi Jacobo PT Service:  (none) Author Type:  Physical Therapist    Filed:  2/18/2018  4:36 PM Date of Service:  2/18/2018  4:36 PM Creation Time:  2/18/2018  4:36 PM    Status:  Signed :  Chelsi Jacobo PT (Physical Therapist)          02/18/18 1600   Quick Adds   Type of Visit Initial PT Evaluation   Living Environment   Lives With facility resident   Living Arrangements assisted living   Home Accessibility no concerns   Living Environment Comment Pt lives in memory care [facility], spouse lives upstairs in same building.   Self-Care   Usual Activity Tolerance fair   Current Activity Tolerance poor   Regular Exercise no   Equipment Currently Used at Home oxygen;walker, rolling   Functional Level Prior   Ambulation 3-->assistive equipment and person   Transferring 3-->assistive equipment and person   Toileting 3-->assistive equipment and person   Bathing 3-->assistive equipment and person   Dressing 3-->assistive equipment and person   Eating 1-->assistive equipment   Communication  0-->understands/communicates without difficulty   Swallowing 0-->swallows foods/liquids without difficulty   Cognition 1 - attention or memory deficits   Fall history within last six months yes   Number of times patient has fallen within last six months 1   Which of the above functional risks had a recent onset or change? ambulation   Prior Functional Level Comment Pt gets assist of 1-2 for all mobility per dtr. Pt sleeps in recliner, walks short distance to bathroom with assist.    General Information   Onset of Illness/Injury or Date of Surgery - Date 02/16/18   Referring Physician Levi   Patient/Family Goals Statement family present, hopes she can return to [facility] memory care   Pertinent History of Current Problem (include personal factors and/or comorbidities that impact the POC) Pt fell at [facility], sustained large laceration L shin, irrigated and repaired in ER. Xray today negative for fracture, spine xray shows old compression fractures. PMH includes dementia, previous compression fractures and vertebroplasty, lymphedema, chronic O2 use.   Precautions/Limitations fall precautions   Weight-Bearing Status - LUE full weight-bearing   Weight-Bearing Status - RUE full weight-bearing   Weight-Bearing Status - LLE full weight-bearing   Weight-Bearing Status - RLE full weight-bearing   Cognitive Status Examination   Orientation person   Level of Consciousness alert;confused  ([place])   Follows Commands and Answers Questions 75% of the time;able to follow single-step instructions   Personal Safety and Judgment impaired   Memory impaired   Pain Assessment   Patient Currently in Pain Yes, see Vital Sign flowsheet   Integumentary/Edema   Integumentary/Edema Comments ED Lymphedema, sees home therapist for lymph care, having special garment made   Posture    Posture Forward head position;Kyphosis   Posture Comments very kyphotic/forward head.   Range of Motion (ROM)   ROM Comment LE's decreased ROM   Strength   Strength Comments  "strength grossly 3/5 in LE's   Bed Mobility   Bed Mobility Comments supine to sit with max assist 2   Transfer Skills   Transfer Comments sit to stand with mod assist 2-3   Gait   Gait Comments took 2 steps bed to chair with walker and mod assist 2   Balance   Balance Comments unsteady sitting EOB, leans L   General Therapy Interventions   Planned Therapy Interventions balance training;strengthening;transfer training;gait training   Clinical Impression   Criteria for Skilled Therapeutic Intervention yes, treatment indicated   PT Diagnosis impaired functional mobility   Influenced by the following impairments generalized deconditioning, decreased strength, pain   Functional limitations due to impairments inability to amb household distances, requires assist 2-3 currently so increased caregiver burden   Clinical Presentation Stable/Uncomplicated   Clinical Presentation Rationale per clinical judgement and per medical record   Clinical Decision Making (Complexity) Low complexity   Therapy Frequency` daily   Predicted Duration of Therapy Intervention (days/wks) 4 days   Anticipated Discharge Disposition Transitional Care Facility;Home with Home Therapy;Home with Assist  (back to [facility] vs TCU)   Risk & Benefits of therapy have been explained Yes   Patient, Family & other staff in agreement with plan of care Yes   [facility] [employer] TM \"6 Clicks\"   2016, Trustees of [facility], under license to [employer].  All rights reserved.   6 Clicks Short Forms Basic Mobility Inpatient Short Form   [facility] AM-PAC  \"6 Clicks\" V.2 Basic Mobility Inpatient Short Form   1. Turning from your back to your side while in a flat bed without using bedrails? 2 - A Lot   2. Moving from lying on your back to sitting on the side of a flat bed without using bedrails? 2 - A Lot   3. Moving to and from a bed to a chair (including a wheelchair)? 2 - A Lot   4. Standing up from a chair using your arms (e.g., wheelchair, or " bedside chair)? 2 - A Lot   5. To walk in hospital room? 2 - A Lot   6. Climbing 3-5 steps with a railing? 1 - Total   Basic Mobility Raw Score (Score out of 24.Lower scores equate to lower levels of function) 11   Total Evaluation Time   Total Evaluation Time (Minutes) 15[RB1.1]        Revision History        User Key Date/Time User Provider Type Action    > RB1.1 2/18/2018  4:36 PM Chelsi Jacobo PT Physical Therapist Sign            Progress Notes by Karan Shaw MD at 2/18/2018 12:54 PM     Author:  Karan Shaw MD Service:  Orthopedics Author Type:  Physician    Filed:  2/18/2018 12:55 PM Date of Service:  2/18/2018 12:54 PM Creation Time:  2/18/2018 12:54 PM    Status:  Signed :  Karan Shaw MD (Physician)         S:  No acute events overnight. Pain well controlled. Doing well.    O:  BP 96/49 (BP Location: Left arm)  Pulse 56  Temp 97.1  F (36.2  C) (Oral)  Resp 18  SpO2 92%      LLE:  ACE wrap in place  +ttp over laceration  +DF/PF/EHL  +lymphedema  +sensation intact throughout    A/P:  89 year old female with LLE laceration    - Xrays negative for fracture  - recommend WBAT  - PT/OT  - pain control  - dvt prophylaxis  -can f/u with PCP for suture removal in 1-2 weeks[JM1.1]                 Revision History        User Key Date/Time User Provider Type Action    > JM1.1 2/18/2018 12:55 PM Karan Shaw MD Physician Sign            Progress Notes by Davian Sims MD at 2/18/2018 11:13 AM     Author:  Davian Sims MD Service:  Hospitalist Author Type:  Physician    Filed:  2/18/2018 11:51 AM Date of Service:  2/18/2018 11:13 AM Creation Time:  2/18/2018 11:13 AM    Status:  Addendum :  Davian Sims MD (Physician)         [facility]  Hospitalist Progress Note  Davian Sims MD    Assessment & Plan   Antoinette Cowan is a 89 year old female with PMHx significant for dementia, chronic atrial fibrillation, breast cancer, severe pulmonary HTN 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 She was admitted for further management.     Mechanical fall with large left[MA1.1] anterior shin skin[MA1.2] laceration[MA1.1]/wound.[MA1.3]  Patient fell after sliding out of a chair at [facility]. Aide was present at time of fall. Large left[MA1.1] shin[MA1.4] laceration which was irrigated and sutured in the emergency department.[MA1.1]  Patient now complains of low back pain.[MA1.3]    -Orthopedic surgery consulted for trauma evaluation given depth of wound over left patella. Recommendations pending XRs. If no fx, will recommend WBAT[MA1.1]  -Wound care consult  -Empiric antibiotic therapy with Keflex  -Lumbar spine X-ray[MA1.4]  -Acetaminophen prn  -Fall precautions  -PT/OT assessment[MA1.1]  -Need to remove sutures in 10 days after injury[MA1.5]    Acute blood loss anemia, secondary to skin laceration[MA1.1].[MA1.3]    Recent Labs  Lab 02/18/18  0610 02/17/18  0945 02/16/18  2112   HGB 7.8* 8.8* 9.3*[MA1.6]     -Continue to monitor Hgb[MA1.1] every 12 hrs[MA1.3]  -Transfusion prn for Hgb<7[MA1.1] (consent obtained from daughter)[MA1.3]    NYDIA on CKD stage 3.  Baseline cr 1-1.2. Cr 1.03 upon admission, increased to 1.91 on 02/18. Suspect pre-renal azotemia versus ATN due to low normal BP.  -Check FENa and UA  -Start IV NS at 100 ml/hr[MA1.1]  -Continue to hold PTA Bumex  -Continue to monitor renal function and electrolytes  -Avoid NSAIDs and other nephrotoxins[MA1.3]     Cervicalgia:   Present prior to trauma[MA1.1].[MA1.3]  -Lidocaine patch     HTN,  Bradycardia[MA1.1].[MA1.3]  BP has been running soft and HR around 50-60[MA1.1] since admission[MA1.3].  [PTA metoprolol XL 50 mg po bid and Bumex 4 mg po bid]  -Decreased PTA metoprolol to 25 mg po bid   -Continue to  hold PTA Bumex  -Continue to monitor BP    Hypothyroidism[MA1.1].[MA1.3]  -Continue PTA levothyroxine     Chronic atrial fibrillation[MA1.1].[MA1.3]  Rate is controlled.  -Warfarin, pharmacy to dose  -Continue metoprolol XL at reduced dose of 25 mg b.i.d with hold parameters (dose decreased as above for soft BP and bradycardia)  -Telemetry     History of recurrent DVT[MA1.1].[MA1.3]  Patient with a history of DVT with recurrence following discontinuation of Coumadin  -Continue warfarin, pharmacy to dose     Lymphedema[MA1.1].[MA1.3]  Patient with chronic lymphedema bilaterally   -Continue to hold PTA Bumex 4 mg bid due to soft blood pressures[MA1.1]/ NYDIA[MA1.3]    Dementia[MA1.1].[MA1.3]  At baseline.    # Pain Assessment:[MA1.1]   Current Pain Score 2/18/2018 2/18/2018 2/18/2018   Patient currently in pain? yes yes yes   Pain score (0-10) 8 5 5   Pain location Back Back Back   Pain descriptors - - -[MA1.7]   - Antoinette is unable to participate in a collaborative plan for pain management due to dementia.   - Please see the plan for pain management as documented above[MA1.1]  - Discussed with daughter[MA1.3]      Active Diet Order      Regular Diet Adult      DVT Prophylaxis:[MA1.1] Warfarin[MA1.3]  Code Status: DNR / DNI  Disposition: Expected discharge[MA1.1] in 2+ days pending improvement in renal function.[MA1.3]    D/W RN[MA1.1].  D/W[MA1.3] daughter[MA1.1] at extent 02/18.[MA1.3]    Time Spent on this Encounter[MA1.8]   I spent 45 minutes on the unit/floor managing the care of Antoinette Laurepeter. Over 50% of my time was spent counseling the patient and/or coordinating care regarding services listed in this note.[MA1.3]    Interval History   Pain[MA1.1] complains of low back pain.[MA1.3] BP is[MA1.1] still[MA1.3] running soft. Hgb down to[MA1.1] 7[MA1.3].8.[MA1.1] Left leg wound is dressed.[MA1.3] No new event overnight.    Data reviewed today: I reviewed all new labs and imaging over the last 24 hours.    Physical  Exam   Temp: 97.1  F (36.2  C) Temp src: Oral BP: 96/49 Pulse: 56 Heart Rate: 49 Resp: 18 SpO2: 92 % O2 Device: Nasal cannula Oxygen Delivery: 1 LPM  There were no vitals filed for this visit.  Vital Signs with Ranges  Temp:  [97.1  F (36.2  C)-98.1  F (36.7  C)] 97.1  F (36.2  C)  Pulse:  [56-78] 56  Heart Rate:  [49-64] 49  Resp:  [16-18] 18  BP: ()/(46-53) 96/49  SpO2:  [92 %-100 %] 92 %  I/O's Last 24 hours  I/O last 3 completed shifts:  In: 450 [P.O.:450]  Out: 200 [Urine:200]    Constitutional: Comfortable, no acute distress  HEENT: No conjunctival pallor.  Neurologic: Awake, alert, oriented x1. Hard of hearing. No focal weakness  Neck: Supple, no elevated JVP  Respiratory: Clear to auscultation[MA1.1] on ant chest exam[MA1.3]  Cardiovascular: Normal S1 and S2. Irregularly irregular rhythm,[MA1.1] slow heart[MA1.3] rate  GI: Abdomen soft, non tender, non distended  Extremities: No calf tenderness, 3+ B/L LE edema  Skin/integument: Left knee skin wound is dressed.    Medications   All medications were reviewed.    Warfarin Therapy Reminder         acetaminophen  1,000 mg Oral Q8H     levothyroxine (SYNTHROID/LEVOTHROID) tablet 50 mcg  50 mcg Oral Daily     sodium chloride (PF)  3 mL Intracatheter Q8H     lidocaine  2 patch Transdermal Q24H     lidocaine   Transdermal Q24h     lidocaine   Transdermal Q8H     metoprolol succinate  25 mg Oral BID        Data     Recent Labs  Lab 02/18/18  0610 02/17/18  1340 02/17/18  0945 02/16/18  2112   WBC 5.1  --   --  4.4   HGB 7.8*  --  8.8* 9.3*   *  --   --  125*     --   --  262   INR 2.93* 2.83*  --  2.87*     --   --   --    POTASSIUM 3.7  --   --   --    CHLORIDE 98  --   --   --    CO2 31  --   --   --    BUN 49*  --   --   --    CR 1.91*  --   --   --    ANIONGAP 8  --   --   --    KACEY 8.4*  --   --   --    *  --   --   --        No results found for this or any previous visit (from the past 24 hour(s)).[MA1.1]       Revision History         User Key Date/Time User Provider Type Action    > MA1.5 2/18/2018 11:51 AM Davian Sims MD Physician Addend     MA1.8 2/18/2018 11:49 AM Davian Sims MD Physician Sign     MA1.3 2/18/2018 11:44 AM Davian Sims MD Physician      MA1.4 2/18/2018 11:42 AM Davian Sims MD Physician      MA1.2 2/18/2018 11:40 AM Davian Sims MD Physician      MA1.6 2/18/2018 11:23 AM Davian Sims MD Physician      MA1.7 2/18/2018 11:20 AM Davian Sims MD Physician      MA1.1 2/18/2018 11:13 AM Davian Sims MD Physician             Progress Notes by Davian Sims MD at 2/17/2018 10:03 AM     Author:  Davian Sims MD Service:  Hospitalist Author Type:  Physician    Filed:  2/18/2018 11:44 AM Date of Service:  2/17/2018 10:03 AM Creation Time:  2/17/2018 10:03 AM    Status:  Addendum :  Davian Sims MD (Physician)         [facility]  Hospitalist Progress Note  Davian Sims MD    Assessment & Plan   Antoinette Cowan is a 89 year old female with PMHx significant for dementia, chronic atrial fibrillation, breast cancer, severe pulmonary HTN with 2-3+ TR, lower extremity lymphedema, DVT, DLP, HTN,[MA1.1] CKD stage 3 (baseline cr 1-1.2),[MA1.2] hypothyroidism, and thrombocytosis who presented with a witnessed fall at her [facility] facility suffering a large[MA1.1] left shin[MA1.3] laceration. Admitted for observation following blood loss related to large laceration.     Mechanical fall with large left[MA1.1] anterior shin[MA1.4] laceration,  Acute blood loss anemia[MA1.1], secondary to skin laceration[MA1.2]:   Patient fell after sliding out of a chair at [facility] facility. Aide was present at time of fall. Large left[MA1.1] shin[MA1.5] laceration which was irrigated and sutured in the emergency department.[MA1.1]  BP is running soft.[MA1.6]      Recent Labs  Lab 02/17/18  0945 02/16/18 2112   HGB 8.8* 9.3*[MA1.7]        -Orthopedic surgery consulted for trauma evaluation given depth of wound over left patella.   -Acetaminophen prn  -Repeat Hgb in a.m.[MA1.1] (daughter will make a decision in am if transfusion indicated)[MA1.6]  -Monitor for neurologic changes in the setting of fall with anticoagulations. Patient did not hit head or lose consciousness, fall was witnessed.[MA1.1]  -Fall precautions  -PT assessment[MA1.6]     Cervicalgia:   Present prior to trauma  -Lidocaine patch[MA1.1]     HTN:  BP is running soft.[MA1.6]  [[MA1.8]PTA metoprolol XL 50 mg po bid[MA1.6] and Bumex 4 mg po bid]  -Decreased PTA metoprolol to 25 mg po bid for soft BP as well as bradycardia[MA1.8]  -Hold PTA Bumex  -Continue to monitor BP    Hypothyroidism:  -Continue PTA levothyroxine[MA1.6]     Chronic atrial fibrillation:[MA1.1]  Rate is controlled.[MA1.6]  -Warfarin, pharmacy to dose  -Continue metoprolol XL 50 mg b.i.d[MA1.1] with hold parameters[MA1.6]     History of recurrent DVT:   Patient with a history of DVT with recurrence following discontinuation of Coumadin  -Continue warfarin, pharmacy to dose     Lymphedema:   Patient with chronic lymphedema bilaterally   -[MA1.1]Hold PTA[MA1.6] Bumex 4 mg bid[MA1.1] due to soft blood pressures    Dementia:  At baseline.[MA1.6]    # Pain Assessment:   Current Pain Score 2/17/2018 2/17/2018 12/18/2017   Patient currently in pain? denies denies -   Pain score (0-10) - - 2   Pain location - - -   Pain descriptors - - -[MA1.1]   Antoinette perkins pain level was assessed and she currently denies pain.[MA1.6]        Active Diet Order      Regular Diet Adult      DVT Prophylaxis: Ambulate every shift  Code Status: DNR / DNI  Disposition: Expected discharge likely[MA1.1] 02/18.    D/W RN, patient's aide, daughter, and care coordinator.    No charge for today's visit.[MA1.6]    Interval History[MA1.1]   Pain is controlled. BP is running soft. Hgb down to 8.8. No new event overnight.[MA1.6]    Data reviewed today: I  reviewed all new labs and imaging over the last 24 hours.    Physical Exam   Temp: 97.6  F (36.4  C) Temp src: Temporal BP: 97/44 Pulse: 60 Heart Rate: 49 Resp: 18 SpO2: 95 % O2 Device: Nasal cannula Oxygen Delivery: 2 LPM  There were no vitals filed for this visit.  Vital Signs with Ranges  Temp:  [97.5  F (36.4  C)-97.6  F (36.4  C)] 97.6  F (36.4  C)  Pulse:  [60] 60  Heart Rate:  [49-77] 49  Resp:  [18] 18  BP: ()/(44-53) 97/44  SpO2:  [93 %-99 %] 95 %  I/O's Last 24 hours       Constitutional: Comfortable, no acute distress  HEENT: No conjunctival pallor.  Neurologic: Awake, alert[MA1.1], oriented x1. Hard of hearing. No focal weakness[MA1.6]  Neck: Supple, no elevated JVP  Respiratory: Clear to auscultation  Cardiovascular: Normal S1 and S2.[MA1.1] Irregularly irregular rhythm, normal rate[MA1.6]  GI: Abdomen soft, non tender, non distended  Extremities: No calf tenderness,[MA1.1] 2-3+ B/L LE[MA1.6] edema  Skin/integument:[MA1.1] Left knee skin wound is dressed.[MA1.6]    Medications[MA1.1]   All medications were reviewed.[MA1.6]    Warfarin Therapy Reminder         bumetanide  4 mg Oral Daily     acetaminophen  1,000 mg Oral Q8H     levothyroxine (SYNTHROID/LEVOTHROID) tablet 50 mcg  50 mcg Oral Daily     metoprolol succinate  50 mg Oral BID     sodium chloride (PF)  3 mL Intracatheter Q8H     lidocaine  2 patch Transdermal Q24H     lidocaine   Transdermal Q24h     lidocaine   Transdermal Q8H        Data     Recent Labs  Lab 02/17/18  0945 02/16/18  2112   WBC  --  4.4   HGB 8.8* 9.3*   MCV  --  125*   PLT  --  262   INR  --  2.87*       No results found for this or any previous visit (from the past 24 hour(s)).[MA1.1]         Revision History        User Key Date/Time User Provider Type Action    > MA1.5 2/18/2018 11:44 AM Davian Sims MD Physician Addend     MA1.3 2/18/2018 11:42 AM Davian Sims MD Physician Addend     MA1.4 2/18/2018 11:40 AM Davian Sims MD Physician Addend      MA1.8 2/17/2018 10:35 AM Davian Sims MD Physician Addend     MA1.6 2/17/2018 10:33 AM Davian Sims MD Physician Sign     MA1.7 2/17/2018 10:10 AM Davian Sims MD Physician      MA1.2 2/17/2018 10:09 AM Davian Sims MD Physician      MA1.1 2/17/2018 10:03 AM Davian Sims MD Physician             ED Notes by Sherrill Vergara RN at 2/17/2018  1:28 AM     Author:  Sherrill Vergara RN Service:  (none) Author Type:  Registered Nurse    Filed:  2/17/2018  1:29 AM Date of Service:  2/17/2018  1:28 AM Creation Time:  2/17/2018  1:29 AM    Status:  Signed :  Sherrill Vergara RN (Registered Nurse)         Staff at assisted living facility notified of pt staying in the hospital[MO1.1]     Revision History        User Key Date/Time User Provider Type Action    > MO1.1 2/17/2018  1:29 AM Sherrill Vergara RN Registered Nurse Sign            Progress Notes by Priscilla Ellis RN at 2/17/2018  1:04 AM     Author:  Priscilla Ellis RN Service:  Nursing Author Type:  Registered Nurse    Filed:  2/17/2018  1:04 AM Date of Service:  2/17/2018  1:04 AM Creation Time:  2/17/2018  1:03 AM    Status:  Signed :  Priscilla Ellis RN (Registered Nurse)         .RECEIVING UNIT ED HANDOFF REVIEW    ED Nurse Handoff Report was reviewed by: Prisclila Ellis on February 17, 2018 at 1:04 AM[SG1.1]            Revision History        User Key Date/Time User Provider Type Action    > SG1.1 2/17/2018  1:04 AM Priscilla Ellis RN Registered Nurse Sign            ED Notes by Sherrill Vergara RN at 2/17/2018 12:50 AM     Author:  Sherrill Vergara RN Service:  (none) Author Type:  Registered Nurse    Filed:  2/17/2018 12:51 AM Date of Service:  2/17/2018 12:50 AM Creation Time:  2/17/2018 12:51 AM    Status:  Signed :  Sherrill Vergara, RN (Registered Nurse)         Pt helped to bedside commode with 2 assist. Pt voids, has small BM[MO1.1]     Revision  History        User Key Date/Time User Provider Type Action    > MO1.1 2/17/2018 12:51 AM Sherrill Vergara RN Registered Nurse Sign            ED Notes signed by Barbie Tse-Provider at 2/17/2018 12:33 AM      Author:  Scan, Non-Provider Service:  (none) Author Type:  (none)    Filed:  2/17/2018 12:33 AM Date of Service:  2/17/2018 12:32 AM Creation Time:  2/17/2018 12:33 AM    Status:  Signed :  Scan, Non-Provider     Scan on 2/17/2018 12:33 AM by Carmencita Non-Provider : [facility]PART[facility]          Revision History        User Key Date/Time User Provider Type Action    > [N/A] 2/17/2018 12:33 AM Scan Non-Provider (none) Sign            ED Provider Notes by Shiva Norris MD at 2/16/2018  8:45 PM     Author:  Shiva Norris MD Service:  Emergency Medicine Author Type:  Physician    Filed:  2/17/2018 12:28 AM Date of Service:  2/16/2018  8:45 PM Creation Time:  2/16/2018  8:55 PM    Status:  Signed :  Shiva Norris MD (Physician)           History     Chief Complaint:  Leg Laceration     History limited due to patient's dementia and subsequently provided by EMS.   DAVION Cowan is a 89 year old female with history of dementia and anemia on coumadin who presents to the emergency department today via EMS for evaluation of a leg laceration. Per EMS report the patient reportedly was her home assisted living facility hen she fell out of her chair and sustained a laceration to her left lower leg. They attempted to manage the patient's bleeding at home with compression, but were unable. They estimated the patient to lose 300mL of blood. Patient also reportedly was placed on her normal 1L nasal cannulated oxygen.     Allergies:[SM1.1]  Ciprofloxacin  Vicodin [Hydrocodone-Acetaminophen][SM1.2]      Medications:    oxyCODONE IR (ROXICODONE) 5 MG tablet  acetaminophen (TYLENOL) 500 MG tablet  senna-docusate (SENOKOT-S;PERICOLACE) 8.6-50 MG per tablet  lidocaine (LIDODERM) 5  % Patch  ondansetron (ZOFRAN ODT) 4 MG ODT tab  LEVOTHYROXINE SODIUM PO  polyethylene glycol (MIRALAX/GLYCOLAX) Packet  Potassium Chloride Magdalene ER (K-DUR PO)  Bumetanide (BUMEX PO)  warfarin (COUMADIN) 2 MG tablet  SIMVASTATIN PO  nystatin (MYCOSTATIN) 715186 UNIT/GM POWD  metoprolol (TOPROL XL) 50 MG 24 hr tablet  Calcium Carbonate-Vitamin D (CALTRATE 600+D PO)  CHOLECALCIFEROL  hydroxyurea (HYDREA) 500 MG capsule    Past Medical History:    Anemia   Atrial fibrillation  Breast cancer  Chronic pain  DVT  Hyperlipidemia   Hypothyroidism   Thrombocytosis     Past Surgical History:    Left knee total replacement  kyphoplasty   Igor nitinol filter    Family History:    History reviewed. No pertinent family history.      Social History:  The patient was accompanied to the ED by EMS.  Smoking Status: Never smoker  Smokeless Tobacco: No  Alcohol Use: No    Marital Status:        Review of Systems   Cardiovascular: Positive for[SM1.1] leg swelling[BT1.1].   Gastrointestinal: Negative for[SM1.1] vomiting[BT1.1].   Musculoskeletal: Negative for[SM1.1] back pain[BT1.1] and[SM1.1] neck pain[BT1.1].   Skin: Positive for wound (large lower left leg laceration).   Neurological: Negative for[SM1.1] headaches[BT1.1].   All other systems reviewed and are negative.    Physical Exam[SM1.1]     Patient Vitals for the past 24 hrs:   BP Temp Temp src Heart Rate SpO2   02/16/18 2054 96/53 97.5  F (36.4  C) Temporal 77 93 %[SM1.3]      Physical Exam[SM1.1]  General: Alert, interactive in mild distress  Head:  Scalp is atraumatic  Eyes:  The pupils are equal, round, and reactive to light    EOM's intact    No scleral icterus  ENT:      Nose:  The external nose is normal  Ears:  External ears are normal  Mouth/Throat: The oropharynx is normal    Mucus membranes are moist.       Neck:  Normal range of motion.      There is no rigidity.    Trachea is in the midline         CV:  Regular rate and rhythm    No murmur   Resp:  Breath  sounds are clear bilaterally    Non-labored, no retractions or accessory muscle use      GI:  Abdomen is soft, no distension, no tenderness.       MS:  Normal strength in all 4 extremities, No midline cervical, thoracic, or lumbar tenderness  Skin:[SM1.4]  Laceration anterior left lower extremity, as shown below.[SM1.5]     [SM1.6]      [SM1.7]      Neuro: Strength[SM1.4] 4[BT1.1]/5 x4.  Sensation intact  In all 4 extremities.      GCS: 1[SM1.4]4 baseline confusion[BT1.1]  Psych:  Awake. Alert.  Normal affect.[SM1.4]        Emergency Department Course     Laboratory:  Laboratory findings were communicated with the[SM1.1] patient[SM1.8] and her caregiver[SM1.9] who voiced understanding of the findings.[SM1.1]    CBC: WBC[SM1.8] 4.4[SM1.10], HGB[SM1.8] 9.3 (L)[SM1.10], PLT[SM1.8] 262[SM1.10]  INR:[SM1.8] 2.87 (H)[SM1.11]     Procedures:     Laceration Repair      LACERATION:  A[SM1.1] complex clean[SM1.7] 9[SM1.5] cm[SM1.1] vertical and 16 cm horizontal[SM1.5] laceration[SM1.1] (as seen in photos above).[SM1.5]     LOCATION:  Left lower leg.    FUNCTION:  Distally[SM1.1] sensation, circulation and tendon function[SM1.7] are intact.    ANESTHESIA:[SM1.1]  Local using 1% lidocaine with epinephrine total of 25 mLs[SM1.7].    PREPARATION:[SM1.1]  Irrigation and Scrubbing[SM1.7] with[SM1.1] Normal Saline and Shur Clens[SM1.7].    DEBRIDEMENT:[SM1.1]  wound explored, no foreign body found[SM1.7].    CLOSURE:  Wound was closed with[SM1.1] One Layer.  Skin closed with Steri-strips, and a combination of 23 x 3.0 Ethylon x 4.0 Ethylon using combination of horizontal mattress and simple interrupted sutures.[SM1.7]    Emergency Department Course:  Nursing notes and vitals reviewed.  2055 My PA student evaluated the patient and gathered a history.[SM1.1]   2125[SM1.10] I performed an exam of the patient as documented above.[SM1.1]   IV was inserted and blood was drawn for laboratory testing, results above.[SM1.8]   2149 I updated  the patient on her blood work results and performed a laceration repair as documented above.[SM1.12]   7728 I spoke with Dr. Devlin of the Hospitalist service  regarding patient's presentation, findings, and plan of care.[SM1.13]   I discussed the treatment plan with the patient[SM1.7] and her caregiver[SM1.9]. They expressed understanding of this plan and consented to admission. I discussed the patient with[SM1.7] Dr. Devlin[SM1.13], who will admit the patient to a monitored bed for further evaluation and treatment. I[SM1.7] personally reviewed the laboratory results with the[SM1.12] Patient[SM1.8] and[SM1.1] her caregiver and[SM1.9] answered all related questions prior to[SM1.1] admission.[SM1.7]   Impression & Plan      Medical Decision Making:[SM1.1]  Ms. Cowan was seen and evaluated and the above workup and laceration repair were undertaken[SM1.14] as noted above. The wound is quite complex. The patient has significant comorbidities and is quite elderly. I believe she would benefit from hospitalization as well as potential surgical evaluation and possible wound team evaluation. Given that the patient lives with her 97 year old  I don't believe she is good candidate for immediate discharge. She will be kept in observation overnight. Her care giver was here throughout the duration of her emergency department course and is in agreement with this plan. Patient does have anemia and this will need to be trended.[SM1.9]  There is no sign of head trauma and the patient is at her baseline mental status, I doubt intracranial hemorrhage.[BT1.1]     Diagnosis:[SM1.1]    ICD-10-CM    1. Laceration of left lower extremity, initial encounter S81.812A    2. Anemia due to blood loss, acute D62    3. Warfarin-induced coagulopathy (H) D68.9     T45.515A[SM1.14]        Disposition:[SM1.1]  Admitted under the supervision of [SM1.7] Claus[SM1.13] and colleagues for further treatment.[SM1.15]     Scribe Disclosure:  Alvaro YARBROUGH  Natasha, am serving as a scribe at 8:55 PM on 2/16/2018 to document services personally performed by Shiva Norris MD based on my observations and the provider's statements to me.    2/16/2018    EMERGENCY DEPARTMENT[SM1.1]       Shiva Norris MD  02/17/18 0028  [BT1.2]     Revision History        User Key Date/Time User Provider Type Action    > BT1.2 2/17/2018 12:28 AM Shiva Norris MD Physician Sign     BT1.1 2/17/2018 12:24 AM Shiva Norris MD Physician      [N/A] 2/17/2018 12:20 AM Shiva Norris MD Physician Share     SM1.3 2/16/2018 11:43 PM Natasha, Alvaro Scribe Share     SM1.9 2/16/2018 11:40 PM Natasha, Alvaro Scribe      SM1.15 2/16/2018 11:36 PM Natasha, Alvaro Scribe Share     SM1.14 2/16/2018 11:35 PM Natasha, Alvaro Scribe Share     SM1.13 2/16/2018 11:27 PM Natasha, Alvaro Scribe Share     [N/A] 2/16/2018 11:23 PM Natasha, Alvaro Scribe Share     SM1.5 2/16/2018 11:11 PM Natasha, Alvaro Scribe Share     SM1.7 2/16/2018 11:08 PM Natasha, Alvaro Scribe Share     SM1.12 2/16/2018  9:50 PM Natasha, Alvaro Scribe Share     SM1.6 2/16/2018  9:37 PM Natasha, Alvaro Scribe Share     SM1.11 2/16/2018  9:36 PM Natasha, Alvaro Scribe Share     SM1.4 2/16/2018  9:32 PM Natasha, Alvaro Scribe Share     SM1.10 2/16/2018  9:26 PM Natasha, Alvaro Scribe Share     SM1.8 2/16/2018  9:12 PM Natasha, Alvaro Scribe Share     SM1.2 2/16/2018  9:01 PM Natasha, Alvaro Scribe Share     SM1.1 2/16/2018  8:55 PM Alvaro Kaur             ED Notes by Sherrill Vergara RN at 2/17/2018 12:06 AM     Author:  Sherrill Vergara RN Service:  (none) Author Type:  Registered Nurse    Filed:  2/17/2018 12:08 AM Date of Service:  2/17/2018 12:06 AM Creation Time:  2/17/2018 12:07 AM    Status:  Addendum :  Sherrill Vergara RN (Registered Nurse)         [facility]  ED Nurse Handoff Report    ED Chief complaint: Laceration      ED Diagnosis:    Final diagnoses:   Laceration of left lower extremity, initial encounter   Anemia due to blood loss, acute   Warfarin-induced coagulopathy (H)       Code Status: DNR / DNI    Allergies:   Allergies   Allergen Reactions     Clindamycin Rash     Ciprofloxacin Hives     Vicodin [Hydrocodone-Acetaminophen] Rash       Activity level - Baseline/Home:  Stand with Assist of 2    Activity Level - Current:   Stand with Assist of 2     Needed?: No    Isolation: No  Infection: Not Applicable    Bariatric?: No    Vital Signs:   Vitals:    02/16/18 2054   BP: 96/53   Temp: 97.5  F (36.4  C)   TempSrc: Temporal   SpO2: 93%       Cardiac Rhythm: ,        Pain level:      Is this patient confused?: Yes    Patient Report: Initial Complaint:[MO1.1] Pt.fell off from the chair. No LOC. She has Lt.lower laceration. Estimate blood lost was 300 ml on scene.She is on coumadin[MO1.2]  Focused Assessment: leg laceration  Tests Performed: labs  Abnormal Results: INR 2.87  Treatments provided: sutures    Family Comments:     OBS brochure/video discussed/provided to patient: No confused    ED Medications: Medications - No data to display    Drips infusing?:  No      ED NURSE PHONE NUMBER: [phone][MO1.1]          Revision History        User Key Date/Time User Provider Type Action    > MO1.2 2/17/2018 12:08 AM Sherrill Vergara RN Registered Nurse Addend     MO1.1 2/17/2018 12:07 AM Sherrill Vergara RN Registered Nurse Sign                  Procedure Notes     No notes of this type exist for this encounter.         Progress Notes - Therapies (Notes from 02/17/18 through 02/20/18)      Progress Notes by Ruth Nolasco PT at 2/20/2018  2:00 PM     Author:  Ruth Nolasoc PT Service:  (none) Author Type:  Physical Therapist    Filed:  2/20/2018  3:35 PM Date of Service:  2/20/2018  2:00 PM Creation Time:  2/20/2018  3:35 PM    Status:  Signed :  Ruth Nolasco PT (Physical Therapist)            02/20/18 1400   General  Information   Discipline PT   Onset of Edema 09/01/13   Affected Body Part(s) Left LE;Right LE   Edema Etiology Unknown   Pertinent history of current problem (PT: include personal factors and/or comorbidities that impact the POC; OT: include additional occupational profile info) Chronic problem per pt's daughter, has had compression wrapping in the past, not recently. LE Lymphedema, sees home therapist for lymph care, having special garment made (velcro, to be available in approx 1 week)   Edema Precaution Comments pt with laceration, per discussion with WOC RN pt appropriate for compression wrapping over top once wound cares completed with lesser compression at that site for tolerance   Pain   Patient currently in pain No   Pain comments pt denies   Edema Examination / Assessment   Skin Condition Pitting;Dryness;Other (see comments)   Skin Condition Comments R LE intact, L LE with wound, see ortho and WOC notes for wound /laceration condition   Scar Yes   Location TKA scar to L LE, reports >10 years old   Mobility no concerns   Scar Comments well healed   Ulcerations No   Stemmer Sign Positive   ROM   Range of Motion (WFL) other (describe)   Description of Range of Motion Deficits see PT eval   Strength   Strength (WFL) other (describe)   Description of Strength Deficits see PT eval   Sensation   Sensation (WFL) no deficits were identified  (pt denies numbness/tingling to B LEs)   Assessment/Plan   Patient presents with Stage 2 Lymphedema   Assessment pt with 2+pitting edema to R LE, 3+ on R dorsum of foot, 3+ pitting to L LE throughout, 2+ pitting edema to B thighs, no noted increased firmness or edema noted to pt's abdomen. Pt's skin is dry, R LE is intact, L LE has laceration and is covered at this time. Hemosiderin staining noted > to R LE. L TKA scar well healed present >10 years old (mature)   Clinical Presentation Stable/Uncomplicated   Clinical Presentation Rationale chronic baseline edema, wraps recently  initiated at baseline, prior to admission HHOT invovled and measured pt for compression garment (velcro)   Clinical Decision Making (Complexity) Low complexity   Planned Edema Interventions Gradient compression bandaging;Exercises;Education   Treatment Frequency daily   Treatment Duration 1 day  (handed off to nursing to start cares 2/21/18)   Patient, Family and/or Staff in agreement with plan of care. Yes   Risks and benefits of treatment have been explained. Yes   Total Evaluation Time   Total Evaluation Time (Minutes) 5[SQ1.1]        Revision History        User Key Date/Time User Provider Type Action    > SQ1.1 2/20/2018  3:35 PM Ruth Nolasco, PT Physical Therapist Sign            Progress Notes by Chelsi Jacobo PT at 2/18/2018  4:36 PM     Author:  Chelsi Jacobo PT Service:  (none) Author Type:  Physical Therapist    Filed:  2/18/2018  4:36 PM Date of Service:  2/18/2018  4:36 PM Creation Time:  2/18/2018  4:36 PM    Status:  Signed :  Chelsi Jacobo PT (Physical Therapist)          02/18/18 1600   Quick Adds   Type of Visit Initial PT Evaluation   Living Environment   Lives With facility resident   Living Arrangements assisted living   Home Accessibility no concerns   Living Environment Comment Pt lives in memory care [facility], spouse lives upstairs in same building.   Self-Care   Usual Activity Tolerance fair   Current Activity Tolerance poor   Regular Exercise no   Equipment Currently Used at Home oxygen;walker, rolling   Functional Level Prior   Ambulation 3-->assistive equipment and person   Transferring 3-->assistive equipment and person   Toileting 3-->assistive equipment and person   Bathing 3-->assistive equipment and person   Dressing 3-->assistive equipment and person   Eating 1-->assistive equipment   Communication 0-->understands/communicates without difficulty   Swallowing 0-->swallows foods/liquids without difficulty   Cognition 1 - attention or memory deficits   Fall history  within last six months yes   Number of times patient has fallen within last six months 1   Which of the above functional risks had a recent onset or change? ambulation   Prior Functional Level Comment Pt gets assist of 1-2 for all mobility per dtr. Pt sleeps in recliner, walks short distance to bathroom with assist.    General Information   Onset of Illness/Injury or Date of Surgery - Date 02/16/18   Referring Physician Levi   Patient/Family Goals Statement family present, hopes she can return to [facility] memory care   Pertinent History of Current Problem (include personal factors and/or comorbidities that impact the POC) Pt fell at [facility], sustained large laceration L shin, irrigated and repaired in ER. Xray today negative for fracture, spine xray shows old compression fractures. PMH includes dementia, previous compression fractures and vertebroplasty, lymphedema, chronic O2 use.   Precautions/Limitations fall precautions   Weight-Bearing Status - LUE full weight-bearing   Weight-Bearing Status - RUE full weight-bearing   Weight-Bearing Status - LLE full weight-bearing   Weight-Bearing Status - RLE full weight-bearing   Cognitive Status Examination   Orientation person   Level of Consciousness alert;confused  ([place])   Follows Commands and Answers Questions 75% of the time;able to follow single-step instructions   Personal Safety and Judgment impaired   Memory impaired   Pain Assessment   Patient Currently in Pain Yes, see Vital Sign flowsheet   Integumentary/Edema   Integumentary/Edema Comments ED Lymphedema, sees home therapist for lymph care, having special garment made   Posture    Posture Forward head position;Kyphosis   Posture Comments very kyphotic/forward head.   Range of Motion (ROM)   ROM Comment LE's decreased ROM   Strength   Strength Comments strength grossly 3/5 in LE's   Bed Mobility   Bed Mobility Comments supine to sit with max assist 2   Transfer Skills   Transfer Comments sit to stand with mod assist  "2-3   Gait   Gait Comments took 2 steps bed to chair with walker and mod assist 2   Balance   Balance Comments unsteady sitting EOB, leans L   General Therapy Interventions   Planned Therapy Interventions balance training;strengthening;transfer training;gait training   Clinical Impression   Criteria for Skilled Therapeutic Intervention yes, treatment indicated   PT Diagnosis impaired functional mobility   Influenced by the following impairments generalized deconditioning, decreased strength, pain   Functional limitations due to impairments inability to amb household distances, requires assist 2-3 currently so increased caregiver burden   Clinical Presentation Stable/Uncomplicated   Clinical Presentation Rationale per clinical judgement and per medical record   Clinical Decision Making (Complexity) Low complexity   Therapy Frequency` daily   Predicted Duration of Therapy Intervention (days/wks) 4 days   Anticipated Discharge Disposition Transitional Care Facility;Home with Home Therapy;Home with Assist  (back to [facility] vs TCU)   Risk & Benefits of therapy have been explained Yes   Patient, Family & other staff in agreement with plan of care Yes   [facility] TM \"6 Clicks\"   2016, Trustees of [facility], under license to [employer].  All rights reserved.   6 Clicks Short Forms Basic Mobility Inpatient Short Form   [facility]  \"6 Clicks\" V.2 Basic Mobility Inpatient Short Form   1. Turning from your back to your side while in a flat bed without using bedrails? 2 - A Lot   2. Moving from lying on your back to sitting on the side of a flat bed without using bedrails? 2 - A Lot   3. Moving to and from a bed to a chair (including a wheelchair)? 2 - A Lot   4. Standing up from a chair using your arms (e.g., wheelchair, or bedside chair)? 2 - A Lot   5. To walk in hospital room? 2 - A Lot   6. Climbing 3-5 steps with a railing? 1 - Total   Basic Mobility Raw Score (Score out of 24.Lower " scores equate to lower levels of function) 11   Total Evaluation Time   Total Evaluation Time (Minutes) 15[RB1.1]        Revision History        User Key Date/Time User Provider Type Action    > RB1.1 2/18/2018  4:36 PM Chelsi Jacobo, PT Physical Therapist Sign

## 2018-02-16 NOTE — IP AVS SNAPSHOT
[address] [facility]    [address] [place] [address]    Phone:  [phone]                                       After Visit Summary   2/16/2018    Antoinette Cowan    MRN: [id number]           After Visit Summary Signature Page     I have received my discharge instructions, and my questions have been answered. I have discussed any challenges I see with this plan with the nurse or doctor.    ..........................................................................................................................................  Patient/Patient Representative Signature      ..........................................................................................................................................  Patient Representative Print Name and Relationship to Patient    ..................................................               ................................................  Date                                            Time    ..........................................................................................................................................  Reviewed by Signature/Title    ...................................................              ..............................................  Date                                                            Time

## 2018-02-16 NOTE — IP AVS SNAPSHOT
"` `           [place] [address] [facility]: [phone]                                              INTERAGENCY TRANSFER FORM - NURSING   2018                    Hospital Admission Date: 2018  SIERRA CALABRESE   : 4/3/1928  Sex: Female        Attending Provider: Davian Sims MD     Allergies:  Clindamycin, Ciprofloxacin, Vicodin [Hydrocodone-acetaminophen]    Infection:  None   Service:  HOSPITALIST    Ht:  1.626 m (5' 4\")   Wt:  81.6 kg (180 lb)   Admission Wt:  --    BMI:  30.9 kg/m 2   BSA:  1.92 m 2            Patient PCP Information     Provider PCP Type    Linda JAXSON Katrinademond       Current Code Status     Date Active Code Status Order ID Comments User Context       Prior      Code Status History     Date Active Date Inactive Code Status Order ID Comments User Context    2018  7:51 AM  DNR/DNI [id number]  Davian Sims MD Outpatient    2018  1:42 AM 2018  7:51 AM DNR/DNI [id number]  Jonny Devlin MD Inpatient    2017  9:38 AM 2018  1:42 AM DNR/DNI [id number]  Lamont Singh DO Outpatient    2017  1:54 PM 2017  9:38 AM DNR/DNI [id number]  Eduin Cabrera MD Inpatient    2017  9:14 AM 2017  1:54 PM DNR/DNI [id number]  Fela Clark MD Outpatient    9/10/2017  7:40 PM 2017  9:14 AM DNR/DNI [id number]  Renée Greenberg MD Inpatient    2013  1:34 PM 9/10/2017  7:40 PM Full Code [id number]  Gladis Leblanc Outpatient      Advance Directives        Scanned docmt in ACP Activity?           Yes, scanned ACP docmt        Hospital Problems as of 2018              Priority Class Noted POA    Knee laceration Medium  2018 Yes    Open knee wound Medium  2018 Yes      Non-Hospital Problems as of 2018              Priority Class Noted    DVT (deep venous thrombosis) (H) Medium  9/3/2013    Edema Medium  9/3/2013    Thrombocytosis (H) Medium  9/3/2013    Generalized weakness Medium  " "9/3/2013    Hypothyroidism Medium  9/3/2013    Memory loss Medium  9/3/2013    Constipation Medium  9/5/2013    Dehydration Medium  9/10/2017    Fracture of rib of right side Medium  12/16/2017      Immunizations     Name Date      Influenza (IIV3) PF 10/17/12     Pneumococcal 23 valent 11/04/08     TD (ADULT, 7+) 04/12/13     Tdap (Adacel,Boostrix) 04/12/13          END      ASSESSMENT     Discharge Profile Flowsheet     EXPECTED DISCHARGE     Patient's communication style  spoken language (English or Bilingual) 02/16/18 2054    Expected Discharge Date  02/21/18 ([facility] w [facility]) 02/21/18 0827   SKIN      DISCHARGE NEEDS ASSESSMENT     Inspection of bony prominences  Full except (identify areas not inspected) 02/21/18 1208    Concerns To Be Addressed  discharge planning concerns 09/11/17 1606   Skin WDL  ex 02/21/18 1208    Equipment Currently Used at Home  oxygen;walker, rolling 02/18/18 1631   Skin Integrity  bruise(s);wound(s) 02/21/18 1208    # of Referrals Placed by [facility]  Homecare;Scheduled Follow-up appointments;Communication hand-offs to next level of Care Providers 02/21/18 0901   Skin Temperature  warm 02/21/18 0033    GASTROINTESTINAL (ADULT,PEDIATRIC,OB)     Skin Moisture  dry 02/21/18 0033    GI WDL  WDL 02/21/18 1208   Skin Elasticity  quick return to original state 02/20/18 1858    Abdominal Appearance  obese 02/21/18 0033   SAFETY      Last Bowel Movement  02/17/18 02/18/18 0839   Safety WDL  WDL 02/21/18 1208    Passing flatus  yes 02/21/18 1208   All Alarms  alarm(s) activated and audible 02/21/18 1208    COMMUNICATION ASSESSMENT                        Assessment WDL (Within Defined Limits) Definitions           Safety WDL     Effective: 09/28/15    Row Information: <b>WDL Definition:</b> Bed in low position, wheels locked; call light in reach; upper side rails up x 2; ID band on<br> <font color=\"gray\"><i>Item=AS safety wdl>>List=AS safety wdl>>Version=F14</i></font>      Skin WDL     Effective: 09/28/15 " "   Row Information: <b>WDL Definition:</b> Warm; dry; intact; elastic; without discoloration; pressure points without redness<br> <font color=\"gray\"><i>Item=AS skin wdl>>List=AS skin wdl>>Version=F14</i></font>      Vitals     Vital Signs Flowsheet     QUICK ADDS     Pain Intervention(s)  Medication (See eMAR) 02/21/18 1056    Quick Adds  Comments 02/21/18 1120   Response to Interventions  Absence of nonverbal indicators of pain 02/21/18 0110    COMMENTS     ANALGESIA SIDE EFFECTS MONITORING      Comments  following all activity and exercise with PT 02/21/18 1120   Side Effects Monitoring: Respiratory Quality  R 02/21/18 1056    VITAL SIGNS     Side Effects Monitoring: Respiratory Depth  N 02/21/18 1056    Temp  97.3  F (36.3  C) 02/21/18 0814   Side Effects Monitoring: Sedation Level  1 02/21/18 1056    Temp src  Oral 02/21/18 0814   HEIGHT AND WEIGHT      Resp  18 02/21/18 0814   Estimated Weight (From ED)  81.6 kg (180 lb) 02/16/18 2054    Pulse  63 02/19/18 2111   SEAN COMA SCALE      Heart Rate  60 02/21/18 1120   Best Eye Response  4-->(E4) spontaneous 02/16/18 2100    Pulse/Heart Rate Source  Monitor 02/21/18 0814   Best Motor Response  6-->(M6) obeys commands 02/16/18 2100    BP  93/40 02/21/18 0814   Best Verbal Response  5-->(V5) oriented 02/16/18 2100    BP Location  Right arm 02/21/18 0814   [place] Coma Scale Score  15 02/16/18 2100    OXYGEN THERAPY     POSITIONING      SpO2  97 % 02/21/18 1120   Body Position  supine, head elevated 02/21/18 1208    O2 Device  Nasal cannula 02/21/18 1120   Head of Bed (HOB)  HOB at 45 degrees 02/21/18 1208    Oxygen Delivery  2 LPM 02/21/18 1120   Chair  Upright in chair 02/21/18 1208    PAIN/COMFORT     Positioning/Transfer Devices  pillows 02/21/18 1208    Patient Currently in Pain  yes 02/21/18 0214   DAILY CARE      Preferred Pain Scale  number (Numeric Rating Pain Scale) 02/19/18 2248   Activity Management  activity adjusted per tolerance 02/21/18 1208    0-10 " "Pain Scale  4 02/21/18 1056   Activity Assistance Provided  assistance, 2 people 02/21/18 1208    Pain Location  Back 02/21/18 1056   Assistive Device Utilized  mechanical lift 02/21/18 1208    Pain Orientation  Lower 02/21/18 1056   Additional Documentation  Activity Device Assistance (Row) 02/18/18 1654    Pain Descriptors  -- (\"just hurts\") 02/18/18 0029                 Patient Lines/Drains/Airways Status    Active LINES/DRAINS/AIRWAYS     Name: Placement date: Placement time: Site: Days: Last dressing change:    Peripheral IV 02/18/18 Left Lower forearm 02/18/18   1311   Lower forearm   2     Pressure Injury 02/17/18 Anterior Buttocks 02/17/18   0212    4     Pressure Injury 02/17/18 Left Leg LLE laceration 02/17/18   0134    4             Patient Lines/Drains/Airways Status    Active PICC/CVC     None            Intake/Output Detail Report     Date Intake     Output Net    Shift P.O. I.V. IV Piggyback Total Urine Total       Noc 02/19/18 2300 - 02/20/18 0659 240 986 -- 1226     Day 02/20/18 0700 - 02/20/18 1459 -- -- -- -- -- -- 0    Maria R 02/20/18 1500 - 02/20/18 2259 -- -- -- -- 400 400 -400    Noc 02/20/18 2300 - 02/21/18 0659 -- -- -- -- -- -- 0    Day 02/21/18 0700 - 02/21/18 1459 100 -- -- 100 -- -- 100      Last Void/BM       Most Recent Value    Urine Occurrence 1 at 02/21/2018 1059    Stool Occurrence 1 at 02/21/2018 1059      Case Management/Discharge Planning     Case Management/Discharge Planning Flowsheet     REFERRAL INFORMATION     Concerns To Be Addressed  discharge planning concerns 09/11/17 1606    Arrived From  home or self-care 12/18/17 1233   FINAL RESOURCES      # of Referrals Placed by [facility]  Homecare;Scheduled Follow-up appointments;Communication hand-offs to next level of Care Providers 02/21/18 0901   Equipment Currently Used at Home  oxygen;walker, rolling 02/18/18 1631    Primary Care MD Name  [place] 12/17/17 1050   ABUSE RISK SCREEN      LIVING ENVIRONMENT     " QUESTION TO PATIENT:  Has a member of your family or a partner(now or in the past) intimidated, hurt, manipulated, or controlled you in any way?  no 02/16/18 2058    Lives With  facility resident 02/18/18 1631   QUESTION TO PATIENT: Do you feel safe going back to the place where you are living?  yes 02/16/18 2058    Living Arrangements  assisted living 02/18/18 1631   OBSERVATION: Is there reason to believe there has been maltreatment of a vulnerable adult (ie. Physical/Sexual/Emotional abuse, self neglect, lack of adequate food, shelter, medical care, or financial exploitation)?  no 02/16/18 2058    COPING/STRESS     OTHER      Major Change/Loss/Stressor  none 02/17/18 0211   Are you depressed or being treated for depression?  No 02/17/18 0207    EXPECTED DISCHARGE     HOMICIDE RISK      Expected Discharge Date  02/21/18 ([facility]) 02/21/18 0827   Feels Like Hurting Others  no 02/16/18 2058    ASSESSMENT/CONCERNS TO BE ADDRESSED

## 2018-02-16 NOTE — IP AVS SNAPSHOT
MRN:[id number]                      After Visit Summary   2/16/2018    Antoinette Cowan    MRN: [id number]           Thank you!     Thank you for choosing [facility] for your care. Our goal is always to provide you with excellent care. Hearing back from our patients is one way we can continue to improve our services. Please take a few minutes to complete the written survey that you may receive in the mail after you visit with us. Thank you!        Patient Information     Date Of Birth          4/3/1928        Designated Caregiver       Most Recent Value    Caregiver    Will someone help with your care after discharge? yes    Name of designated caregiver Nicol Cowan, Daughter    Phone number of caregiver [phone]    Caregiver address -      About your hospital stay     You were admitted on:  February 17, 2018 You last received care in the:  [place] [address] [facility]    You were discharged on:  February 21, 2018       Who to Call     For medical emergencies, please call 911.  For non-urgent questions about your medical care, please call your primary care provider or clinic, [phone]          Attending Provider     Provider Specialty    Trigger, Shiva Kramer MD Emergency Medicine    Devlin, Jonny Naranjo MD Internal Medicine    [facility], Davian CRAIG MD Internal Medicine       Primary Care Provider Office Phone # Fax #    [place] [phone]      After Care Instructions     Activity       Your activity upon discharge: activity as tolerated            Diet       Follow this diet upon discharge: Orders Placed This Encounter      Regular Diet Adult                  Follow-up Appointments     Follow-up and recommended labs and tests        Follow up with PCP in one week with INR, CBC, and BMP.  Sutures are to be removed on March 1st.  Please continue to use lower extremity wraps and keep legs elevated.            Follow-up and recommended labs and tests         You have an Appointment Thursday March 1, 2018 @ 1:45 (Labs). 2:00pm with Dr. Brown  Sutures to be removed.   Labs: CBC, INR, BMP  [address]  [phone]                  Additional Services     Home Care OT Referral for Hospital Discharge       OT to eval and treat Lymphedema    Your provider has ordered home care - occupational therapy. If you have not been contacted within 2 days of your discharge please call  [phone]            Home Care PT Referral for Hospital Discharge       PT to eval and treat for lymphedema and physical deconditioning    .  You have been discharged with services from [facility]. They will call you to set up initial appointment. If you have questions please call the 24 hr patient line @ [phone]            Home care nursing referral       RN skilled nursing visit. RN to assess incision for signs/symptoms of infection, hydration, nutrition and bowel status and home safety.    You have been discharged with services from [facility]. They will call you to set up initial appointment. If you have questions please call the 24 hr patient line @ [phone]                  Further instructions from your care team        Patient care order DAILY     Comments: Gradient Compression Wraps   1. To be worn for 23/24 hour per day.   2. To be worn from base of the toes (toes should be exposed) to ~1/2 inch below knee crease.   3. The wraps should be applied in this order: A. Tube bandage from base of toes to just below the crease in the knee; B. More narrow brown roll from base of toes to ankle; C. Wider brown roll from ankle to just below knee crease. Brown bandages should be applied in spiral fashion without full stretch. Layers should be placed close together near the toes, and gradually widen the closer to the knee to produce desired compression.   4. RN should completely remove compression wraps for 1 hour each day for skin care, skin  assessment, wound care if needed, and then re-don. Fully remove and contact the edema therapist if legs or feet become painful, change in sensation, SOB, change in skin color, or pt otherwise does not tolerate wrapping.   5. If wraps become loose please tighten and re-tape, or fully unroll and re-wrap.   6. When patient discharges, please send bandages and supplies with patient.   Cleanin. Please rinse all compression wrap bandages and tube bandages in the patient's sink every 2-3 days starting 18. Lay them flat to dry on a towel. DO NOT hang to dry as this stretches them out and they lose compression.   2. Remove if pain, numbness, change in skin color and/or become soiled/wet.   3. Please try to prevent areas of bunching up and rolling to decrease risk of tourniquet effect (cutting off circulation and lymph flow to limb).   4. DO NOT throw away         Wound care DAILY     Comments: Plan of care for wound located on left LE: daily   Carefully remove old dressings.  If they stick then wet prior to removal- don't tear them off   1. Clean both legs and feet with gentle soap and water, getting between the toes   2. Apply lotion from toes to knees, not between toes.   3. Clean wound on left anterior/ lateral shin with MicroKlenz Spray, pat dry   4.  Paint the entire area, including and especially the wound and suture line, with No Sting Skin Prep- this will help protect the skin from moisture and drainage and help keep sutures together until wound heals more.   5. Cover wounds/ suture lines with Aquacel Ag- cut wide strips as needed.   6. Lightly mist the Aquacel Ag with MicroKlenz Spray in areas where there is no drainage.  If drainage then leave Aquacel dry to better absorb drainage.   7. Cover with ABD   8. Secure with Ana wrap, time and date   9.  Compression as ordered- making sure to time and date the tape when wraps applied   10.  Keep heels elevated at all times   - consider stopping Aquacel Ag  "dressings when no more drainage, can just cover sutures with an ABD so they don't snag on the wraps.            Warfarin Instruction     You have started taking a medicine called warfarin. This is a blood-thinning medicine (anticoagulant). It helps prevent and treat blood clots.      Before leaving the hospital, make sure you know how much to take and how long to take it.      You will need regular blood tests to make sure your blood is clotting safely. It is very important to see your doctor for regular blood tests.    Talk to your doctor before taking any new medicine (this includes over-the-counter drugs and herbal products). Many medicines can interact with warfarin. This may cause more bleeding or too much clotting.     Eating a lot of vitamin K--found in green, leafy vegetables--can change the way warfarin works in your body. Do NOT avoid these foods. Instead, try to eat the same amount each day.     Bleeding is the most common side-effect of warfarin. You may notice bleeding gums, a bloody nose, bruises and bleeding longer when you cut yourself. See a doctor at once if:   o You cough up blood  o You find blood in your stool (poop)  o You have a deep cut, or a cut that bleeds longer than 10 minutes   o You have a bad cut, hard fall, accident or hit your head (go to urgent care or the emergency room).    For women who can get pregnant: This medicine can harm an unborn baby. Be very careful not to get pregnant while taking this medicine. If you think you might be pregnant, call your doctor right away.    For more information, read \"Guide to Warfarin Therapy,  the booklet you received in the hospital.        Pending Results     No orders found from 2/14/2018 to 2/17/2018.            Statement of Approval     Ordered          02/21/18 0842  I have reviewed and agree with all the recommendations and orders detailed in this document.  EFFECTIVE NOW     Approved and electronically signed by:  Davian Sims MD  " "           Admission Information     Date & Time Provider Department Dept. Phone    2018 Davian Sims MD [place] [address] [facility] [phone]      Your Vitals Were     Blood Pressure Pulse Temperature Respirations Pulse Oximetry       93/40 (BP Location: Right arm) 63 97.3  F (36.3  C) (Oral) 18 97%       MyChart Information     "[facility]hart lets you send messages to your doctor, view your test results, renew your prescriptions, schedule appointments and more. To sign up, go to [email or web address] . Click on \"Log in\" on the left side of the screen, which will take you to the Welcome page. Then click on \"Sign up Now\" on the right side of the page.     You will be asked to enter the access code listed below, as well as some personal information. Please follow the directions to create your username and password.     Your access code is: [email or web address]  Expires: 3/18/2018 10:23 AM     Your access code will  in 90 days. If you need help or a new code, please call your [facility] clinic or [phone].        Care EveryWhere ID     This is your Care EveryWhere ID. This could be used by other organizations to access your [facility] medical records  [id number]        Equal Access to Services     GIOVANNY SOMMER AH: Lakhwinder madison Sogeraldo, waaxda luqadaha, qaybta kaalmada adeegyadaylin, madhuri sharma. So [facility] [phone].    ATENCIÓN: Si habla español, tiene a nogueira disposición servicios gratuitos de asistencia lingüística. Llame al [phone].    We comply with applicable federal civil rights laws and [place] laws. We do not discriminate on the basis of race, color, national origin, age, disability, sex, sexual orientation, or gender identity.               Review of your medicines      START taking        Dose / Directions    cephalexin 250 MG/5ML suspension   Commonly known as:  KEFLEX   Indication:  Infection of the Skin and/or Related Soft Tissue   Used for:  " Laceration of left lower extremity, initial encounter        Dose:  500 mg   Take 10 mLs (500 mg) by mouth 3 times daily   Quantity:  210 mL   Refills:  0         CONTINUE these medicines which may have CHANGED, or have new prescriptions. If we are uncertain of the size of tablets/capsules you have at home, strength may be listed as something that might have changed.        Dose / Directions    metoprolol succinate 25 MG 24 hr tablet   Commonly known as:  TOPROL-XL   This may have changed:    - medication strength  - how much to take   Used for:  Essential hypertension        Dose:  12.5 mg   Take 0.5 tablets (12.5 mg) by mouth 2 times daily   Quantity:  30 tablet   Refills:  3         CONTINUE these medicines which have NOT CHANGED        Dose / Directions    ACETAMINOPHEN PO        Dose:  1000 mg   Take 1,000 mg by mouth every 6 hours as needed for pain or fever   Refills:  0       HYDREA 500 MG capsule CHEMO   Generic drug:  hydroxyurea        Dose:  1000 mg   Take 1,000 mg by mouth daily   Refills:  0       LEVOTHYROXINE SODIUM PO        Dose:  50 mcg   Take 50 mcg by mouth daily   Refills:  0       lidocaine 5 % Patch   Commonly known as:  LIDODERM   Used for:  Closed fracture of multiple ribs of right side, initial encounter        Apply up to 3 patches to painful area at once for up to 12 h within a 24 h period.  Remove after 12 hours.   Quantity:  30 patch   Refills:  1       nystatin 213939 UNIT/GM Powd   Commonly known as:  MYCOSTATIN        Apply topically 2 times daily Under breasts   Refills:  0       polyethylene glycol Packet   Commonly known as:  MIRALAX/GLYCOLAX        Dose:  1 packet   Take 1 packet by mouth every other day   Refills:  0       saccharomyces boulardii 250 MG capsule   Commonly known as:  FLORASTOR        Dose:  250 mg   Take 250 mg by mouth daily   Refills:  0       senna-docusate 8.6-50 MG per tablet   Commonly known as:  SENOKOT-S;PERICOLACE        Dose:  1 tablet   Take 1 tablet  by mouth daily as needed for constipation   Refills:  0       SIMVASTATIN PO        Dose:  20 mg   Take 20 mg by mouth At Bedtime   Refills:  0       WARFARIN SODIUM PO        Dose:  1 mg   Take 1 mg by mouth daily   Refills:  0         STOP taking     BUMEX PO           IBUPROFEN PO           K-DUR PO           METOLAZONE PO           oxyCODONE IR 5 MG tablet   Commonly known as:  ROXICODONE                Where to get your medicines      These medications were sent to [facility] [place], [place] - [address]  [address], [place] [address]     Phone:  [phone]     cephalexin 250 MG/5ML suspension    metoprolol succinate 25 MG 24 hr tablet                Protect others around you: Learn how to safely use, store and throw away your medicines at www.disposemymeds.org.        ANTIBIOTIC INSTRUCTION     You've Been Prescribed an Antibiotic - Now What?  Your healthcare team thinks that you or your loved one might have an infection. Some infections can be treated with antibiotics, which are powerful, life-saving drugs. Like all medications, antibiotics have side effects and should only be used when necessary. There are some important things you should know about your antibiotic treatment.      Your healthcare team may run tests before you start taking an antibiotic.    Your team may take samples (e.g., from your blood, urine or other areas) to run tests to look for bacteria. These test can be important to determine if you need an antibiotic at all and, if you do, which antibiotic will work best.      Within a few days, your healthcare team might change or even stop your antibiotic.    Your team may start you on an antibiotic while they are working to find out what is making you sick.    Your team might change your antibiotic because test results show that a different antibiotic would be better to treat your infection.    In some cases, once your team has more information, they learn  that you do not need an antibiotic at all. They may find out that you don't have an infection, or that the antibiotic you're taking won't work against your infection. For example, an infection caused by a virus can't be treated with antibiotics. Staying on an antibiotic when you don't need it is more likely to be harmful than helpful.      You may experience side effects from your antibiotic.    Like all medications, antibiotics have side effects. Some of these can be serious.    Let you healthcare team know if you have any known allergies when you are admitted to the hospital.    One significant side effect of nearly all antibiotics is the risk of severe and sometimes deadly diarrhea caused by Clostridium difficile (C. Difficile). This occurs when a person takes antibiotics because some good germs are destroyed. Antibiotic use allows C. diificile to take over, putting patients at high risk for this serious infection.    As a patient or caregiver, it is important to understand your or your loved one's antibiotic treatment. It is especially important for caregivers to speak up when patients can't speak for themselves. Here are some important questions to ask your healthcare team.    What infection is this antibiotic treating and how do you know I have that infection?    What side effects might occur from this antibiotic?    How long will I need to take this antibiotic?    Is it safe to take this antibiotic with other medications or supplements (e.g., vitamins) that I am taking?     Are there any special directions I need to know about taking this antibiotic? For example, should I take it with food?    How will I be monitored to know whether my infection is responding to the antibiotic?    What tests may help to make sure the right antibiotic is prescribed for me?      Information provided by:  www.cdc.gov/getsmart  U.S. Department of Health and Human Services  Centers for disease Control and Prevention  National  Center for Emerging and Zoonotic Infectious Diseases  Division of Healthcare Quality Promotion             Medication List: This is a list of all your medications and when to take them. Check marks below indicate your daily home schedule. Keep this list as a reference.      Medications           Morning Afternoon Evening Bedtime As Needed    ACETAMINOPHEN PO   Take 1,000 mg by mouth every 6 hours as needed for pain or fever   Last time this was given:  1,000 mg on 2/21/2018  5:45 AM                                cephalexin 250 MG/5ML suspension   Commonly known as:  KEFLEX   Take 10 mLs (500 mg) by mouth 3 times daily   Last time this was given:  500 mg on 2/21/2018 10:22 AM                                HYDREA 500 MG capsule CHEMO   Take 1,000 mg by mouth daily   Generic drug:  hydroxyurea                                LEVOTHYROXINE SODIUM PO   Take 50 mcg by mouth daily   Last time this was given:  50 mcg on 2/21/2018  8:46 AM                                lidocaine 5 % Patch   Commonly known as:  LIDODERM   Apply up to 3 patches to painful area at once for up to 12 h within a 24 h period.  Remove after 12 hours.                                metoprolol succinate 25 MG 24 hr tablet   Commonly known as:  TOPROL-XL   Take 0.5 tablets (12.5 mg) by mouth 2 times daily   Last time this was given:  12.5 mg on 2/21/2018  8:46 AM                                nystatin [id number] UNIT/GM Powd   Commonly known as:  MYCOSTATIN   Apply topically 2 times daily Under breasts                                polyethylene glycol Packet   Commonly known as:  MIRALAX/GLYCOLAX   Take 1 packet by mouth every other day                                saccharomyces boulardii 250 MG capsule   Commonly known as:  FLORASTOR   Take 250 mg by mouth daily                                senna-docusate 8.6-50 MG per tablet   Commonly known as:  SENOKOT-S;PERICOLACE   Take 1 tablet by mouth daily as needed for constipation   Last time this was  given:  1 tablet on 2/21/2018 10:22 AM                                SIMVASTATIN PO   Take 20 mg by mouth At Bedtime                                WARFARIN SODIUM PO   Take 1 mg by mouth daily   Last time this was given:  1 mg on 2/20/2018  7:48 PM

## 2018-02-16 NOTE — IP AVS SNAPSHOT
` [place] [id number] [facility]: [phone]            Medication Administration Report for Antoinette Cowan as of 02/21/18 1303   Legend:    Given Hold Not Given Due Canceled Entry Other Actions    Time Time (Time) Time  Time-Action       Inactive    Active    Linked        Medications 02/15/18 02/16/18 02/17/18 02/18/18 02/19/18 02/20/18 02/21/18    acetaminophen (TYLENOL) Suppository 650 mg  Dose: 650 mg  Freq: EVERY 4 HOURS PRN Route: RE  PRN Reason: mild pain  Start: 02/17/18 0142   Admin Instructions: Alternate ibuprofen (if ordered) with acetaminophen.  Maximum acetaminophen dose from all sources = 75 mg/kg/day not to exceed 4 grams/day.               acetaminophen (TYLENOL) tablet 1,000 mg  Dose: 1,000 mg  Freq: EVERY 8 HOURS Route: PO  Start: 02/17/18 0300   Admin Instructions: Maximum acetaminophen dose from all sources = 75 mg/kg/day not to exceed 4 gram       (0300)-Not Given       1114 (1,000 mg)-Given       1803 (1,000 mg)-Given        0344 (1,000 mg)-Given       1104 (1,000 mg)-Given       1857 (1,000 mg)-Given        0601 (1,000 mg)-Given [C]       1355 (1,000 mg)-Given       2106 (1,000 mg)-Given        0535 (1,000 mg)-Given       1413 (1,000 mg)-Given       2115 (1,000 mg)-Given        0545 (1,000 mg)-Given       [ ] 1400       [ ] 2200           acetaminophen (TYLENOL) tablet 650 mg  Dose: 650 mg  Freq: EVERY 4 HOURS PRN Route: PO  PRN Reason: mild pain  Start: 02/17/18 0142   Admin Instructions: Alternate ibuprofen (if ordered) with acetaminophen.  Maximum acetaminophen dose from all sources = 75 mg/kg/day not to exceed 4 grams/day.               bisacodyl (DULCOLAX) Suppository 10 mg  Dose: 10 mg  Freq: DAILY PRN Route: RE  PRN Reason: constipation  Start: 02/17/18 0142   Admin Instructions: Hold for loose stools.  This is the third step of a three step constipation treatment.               cephalexin (KEFLEX) suspension 500 mg  Dose: 500 mg  Freq: 3 TIMES DAILY Route:  "PO  Indications of Use: SKIN AND SOFT TISSUE INFECTION  Start: 02/18/18 1600   Admin Instructions: Shake Well.        1625 (500 mg)-Given       2212 (500 mg)-Given        1014 (500 mg)-Given       1626 (500 mg)-Given       2111 (500 mg)-Given        1008 (500 mg)-Given       1605 (500 mg)-Given       2115 (500 mg)-Given        1022 (500 mg)-Given       [ ] 1600       [ ] 2200           levothyroxine (SYNTHROID/LEVOTHROID) tablet 50 mcg  Dose: 50 mcg  Freq: DAILY Route: PO  Start: 02/17/18 0900      0810 (50 mcg)-Given        0827 (50 mcg)-Given        1006 (50 mcg)-Given        1005 (50 mcg)-Given        0846 (50 mcg)-Given           Lidocaine (LIDOCARE) 4 % Patch 2 patch  Dose: 2 patch  Freq: EVERY 24 HOURS 0800 Route: TD  Start: 02/17/18 0800   Admin Instructions: Apply patch(s) to back. To prevent lidocaine toxicity, patient should be patch free for 12 hrs daily. Patches may be cut to smaller size prior to removing release liner.  NEVER APPLY HEAT OVER PATCH which increases absorption and risk of local anesthetic toxicity. Do not apply over area where liposomal bupivacaine was injected for 96 hours post injection.       0810 (2 patch)-Given        0814 (2 patch)-Given        1350 (2 patch)-Given [C]        1006 (2 patch)-Given [C]        0846 (2 patch)-Given [C]           lidocaine (LMX4) cream  Freq: EVERY 1 HOUR PRN Route: Top  PRN Reason: pain  PRN Comment: with VAD insertion or accessing implanted port.  Start: 02/17/18 0142   Admin Instructions: Do NOT give if patient has a history of allergy to any local anesthetic or any \"faye\" product.  Apply 30 minutes prior to VAD insertion or port access. MAX Dose: 2.5 g (  of 5 g tube).               lidocaine 1 % 1 mL  Dose: 1 mL  Freq: EVERY 1 HOUR PRN Route: OTHER  PRN Comment: mild pain with VAD insertion or accessing implanted port  Start: 02/17/18 0142   Admin Instructions: Do NOT give if patient has a history of allergy to any local anesthetic or any \"faye\" " product. MAX dose 1 mL subcutaneous OR intradermal in divided doses.               lidocaine patch in PLACE  Freq: EVERY 8 HOURS Route: TD  Start: 02/17/18 0800   Admin Instructions: Chart every shift, confirming that patch is still in place on patient (no barcode scan needed). See patch order for dose information.  NEVER APPLY HEAT OVER PATCH which will increase absorption and may lead to risk of local anesthetic toxicity. Do not apply over area where liposomal bupivacaine injected for 96 hours.       0816 ( )-Patch in Place       1508 ( )-Patch in Place        (0037)-Not Given [C]       0829 ( )-Patch in Place       1626 (1 each)-Patch in Place [C]        (0604)-Not Given [C]       1356 ( )-Patch in Place       1622 ( )-Patch in Place        0251 ( )-Negative       1100 ( )-Given       1606 ( )-Patch in Place        (0033)-Not Given [C]       0848 ( )-Patch in Place       [ ] 1600           lidocaine patch REMOVAL  Freq: EVERY 24 HOURS 2000 Route: TD  Start: 02/17/18 2000   Admin Instructions: Patient should have a 12 hour patch free interval       2003 ( )-Patch Removed        1901 ( )-Patch Removed        2051 ( )-Patch Removed        1943 ( )-Patch Removed        [ ] 2000           melatonin tablet 1 mg  Dose: 1 mg  Freq: AT BEDTIME PRN Route: PO  PRN Reason: sleep  Start: 02/17/18 0142   Admin Instructions: Do not give unless at least 6 hours of uninterrupted sleep is expected.       2130 (1 mg)-Given         2106 (1 mg)-Given        1948 (1 mg)-Given            metoprolol succinate (TOPROL-XL) half-tab 12.5 mg  Dose: 12.5 mg  Freq: 2 TIMES DAILY Route: PO  Start: 02/21/18 0900   Admin Instructions: DO NOT CRUSH. Tablet may be split in half along score line.  Hold for SBP<110 or HR<55           0846 (12.5 mg)-Given       [ ] 2100           naloxone (NARCAN) injection 0.1-0.4 mg  Dose: 0.1-0.4 mg  Freq: EVERY 2 MIN PRN Route: IV  PRN Reason: opioid reversal  Start: 02/17/18 0142   Admin Instructions: For  respiratory rate LESS than or EQUAL to 8.  Partial reversal dose:  0.1 mg titrated q 2 minutes for Analgesia Side Effects Monitoring Sedation Level of 3 (frequently drowsy, arousable, drifts to sleep during conversation).Full reversal dose:  0.4 mg bolus for Analgesia Side Effects Monitoring Sedation Level of 4 (somnolent, minimal or no response to stimulation).  For ordered doses up to 2mg give IVP. Give each 0.4mg over 15 seconds in emergency situations. For non-emergent situations further dilute in 9mL of NS to facilitate titration of response.               ondansetron (ZOFRAN-ODT) ODT tab 4 mg  Dose: 4 mg  Freq: EVERY 6 HOURS PRN Route: PO  PRN Reasons: nausea,vomiting  Start: 02/17/18 0142   Admin Instructions: This is Step 1 of nausea and vomiting management.  If nausea not resolved in 15 minutes, go to Step 2 prochlorperazine (COMPAZINE). Do not push through foil backing. Peel back foil and gently remove. Place on tongue immediately. Administration with liquid unnecessary  With dry hands, peel back foil backing and gently remove tablet; do not push oral disintegrating tablet through foil backing; administer immediately on tongue and oral disintegrating tablet dissolves in seconds; then swallow with saliva; liquid not required.              Or  ondansetron (ZOFRAN) injection 4 mg  Dose: 4 mg  Freq: EVERY 6 HOURS PRN Route: IV  PRN Reasons: nausea,vomiting  Start: 02/17/18 0142   Admin Instructions: This is Step 1 of nausea and vomiting management.  If nausea not resolved in 15 minutes, go to Step 2 prochlorperazine (COMPAZINE).  Irritant. For ordered doses up to 4 mg, give IV Push undiluted over 2-5 minutes.               polyethylene glycol (MIRALAX/GLYCOLAX) Packet 17 g  Dose: 17 g  Freq: DAILY PRN Route: PO  PRN Reason: constipation  Start: 02/17/18 0142   Admin Instructions: Give in 8oz of  water, juice, or soda. Hold for loose stools.  This is the second step of a three step constipation treatment.  1  Packet = 17 grams. Mixed prescribed dose in 8 ounces of water. Follow with 8 oz. of water.               potassium chloride (KLOR-CON) Packet 20-40 mEq  Dose: 20-40 mEq  Freq: EVERY 2 HOURS PRN Route: ORAL OR FEED  PRN Reason: potassium supplementation  Start: 02/17/18 1013   Admin Instructions: Use if unable to tolerate tablets.  If Serum K+ 3.0-3.3, dose = 60 mEq po total dose (40 mEq x1 followed in 2 hours by 20 mEq x1). Recheck K+ level 4 hours after dose and the next AM.  If Serum K+ 2.5-2.9, dose = 80 mEq po total dose (40 mEq Q2H x2). Recheck K+ level 4 hours after dose and the next AM.  If Serum K+ less than 2.5, See IV order.  Dissolve packet contents in 4-8 ounces of cold water or juice.               potassium chloride 10 mEq in 100 mL intermittent infusion with 10 mg lidocaine  Dose: 10 mEq  Freq: EVERY 1 HOUR PRN Route: IV  PRN Reason: potassium supplementation  Start: 02/17/18 1013   Admin Instructions: Infuse via PERIPHERAL LINE. Use potassium with lidocaine for pain with peripheral administration.  If Serum K+ 3.0-3.3, dose = 10 mEq/hr x4 doses (40 mEq IV total dose). Recheck K+ level 2 hours after dose and the next AM.  If Serum K+ less than 3.0, dose = 10 mEq/hr x6 doses (60 mEq IV total dose). Recheck K+ level 2 hours after dose and the next AM.               potassium chloride 10 mEq in 100 mL sterile water intermittent infusion (premix)  Dose: 10 mEq  Freq: EVERY 1 HOUR PRN Route: IV  PRN Reason: potassium supplementation  Start: 02/17/18 1013   Admin Instructions: Infuse via PERIPHERAL LINE or CENTRAL LINE. Use for central line replacement if patient weight less than 65 kg, if patient is on TPN with high potassium content or if unit does not stock 20 mEq bags.   If Serum K+ 3.0-3.3, dose = 10 mEq/hr x4 doses (40 mEq IV total dose). Recheck K+ level 2 hours after dose and the next AM.   If Serum K+ less than 3.0, dose = 10 mEq/hr x6 doses (60 mEq IV total dose). Recheck K+ level 2 hours after  dose and the next AM.               potassium chloride 20 mEq in 50 mL intermittent infusion  Dose: 20 mEq  Freq: EVERY 1 HOUR PRN Route: IV  PRN Reason: potassium supplementation  Start: 02/17/18 1013   Admin Instructions: Infuse via CENTRAL LINE Only. May need EKG if less than 65 kg or on TPN - Max rate is 0.3 mEq/kg/hr for patients not on EKG monitoring.   If Serum K+ 3.0-3.3, dose = 20 mEq/hr x2 doses (40 mEq IV total dose). Recheck K+ level 2 hours after dose and the next AM.  If Serum K+ less than 3.0, dose = 20 mEq/hr x3 doses (60 mEq IV total dose). Recheck K+ level 2 hours after dose and the next AM.               potassium chloride SA (K-DUR/KLOR-CON M) CR tablet 20-40 mEq  Dose: 20-40 mEq  Freq: EVERY 2 HOURS PRN Route: PO  PRN Reason: potassium supplementation  Start: 02/17/18 1013   Admin Instructions: Use if able to take PO.   If Serum K+ 3.0-3.3, dose = 60 mEq po total dose (40 mEq x1 followed in 2 hours by 20 mEq x1). Recheck K+ level 4 hours after dose and the next AM.  If Serum K+ 2.5-2.9, dose = 80 mEq po total dose (40 mEq Q2H x2). Recheck K+ level 4 hours after dose and the next AM.  If Serum K+ less than 2.5, See IV order.  DO NOT CRUSH               prochlorperazine (COMPAZINE) injection 5 mg  Dose: 5 mg  Freq: EVERY 6 HOURS PRN Route: IV  PRN Reasons: nausea,vomiting  Start: 02/17/18 0142   Admin Instructions: This is Step 2 of nausea and vomiting management. Give if nausea not resolved 15 minutes after giving ondansetron (ZOFRAN). If nausea not resolved in 15 minutes, go to Step 3 metoclopramide (REGLAN), if ordered.  For ordered doses up to 10 mg, give IV Push undiluted. Each 5mg over 1 minute.              Or  prochlorperazine (COMPAZINE) tablet 5 mg  Dose: 5 mg  Freq: EVERY 6 HOURS PRN Route: PO  PRN Reason: vomiting  Start: 02/17/18 0142   Admin Instructions: This is Step 2 of nausea and vomiting management. Give if nausea not resolved 15 minutes after giving ondansetron (ZOFRAN). If  nausea not resolved in 15 minutes, go to Step 3 metoclopramide (REGLAN), if ordered.              Or  prochlorperazine (COMPAZINE) Suppository 12.5 mg  Dose: 12.5 mg  Freq: EVERY 12 HOURS PRN Route: RE  PRN Reasons: nausea,vomiting  Start: 02/17/18 0142   Admin Instructions: This is Step 2 of nausea and vomiting management. Give if nausea not resolved 15 minutes after giving ondansetron (ZOFRAN). If nausea not resolved in 15 minutes, go to Step 3 metoclopramide (REGLAN), if ordered.               senna-docusate (SENOKOT-S;PERICOLACE) 8.6-50 MG per tablet 1 tablet  Dose: 1 tablet  Freq: 2 TIMES DAILY PRN Route: PO  PRN Reason: constipation  Start: 02/17/18 0142   Admin Instructions: If no bowel movement in 24 hours, increase to 2 tablets PO.  Hold for loose stools.  This is the first step of a three step constipation treatment.                  1022 (1 tablet)-Given          Or  senna-docusate (SENOKOT-S;PERICOLACE) 8.6-50 MG per tablet 2 tablet  Dose: 2 tablet  Freq: 2 TIMES DAILY PRN Route: PO  PRN Reason: constipation  Start: 02/17/18 0142   Admin Instructions: Hold for loose stools.  This is the first step of a three step constipation treatment.         1006 (2 tablet)-Given                    sodium chloride (PF) 0.9% PF flush 3 mL  Dose: 3 mL  Freq: EVERY 8 HOURS Route: IK  Start: 02/17/18 0300   Admin Instructions: And Q1H PRN, to lock peripheral IV dormant line.       0214 (3 mL)-Given       (1126)-Not Given       (1805)-Not Given               (1104)-Not Given       (1859)-Not Given        (0602)-Not Given       (1007)-Not Given       (1840)-Not Given        (0251)-Not Given       1413 (3 mL)-Given       1948 (3 mL)-Given        (0310)-Not Given       0848 (3 mL)-Given       [ ] 1700           sodium chloride (PF) 0.9% PF flush 3 mL  Dose: 3 mL  Freq: EVERY 1 HOUR PRN Route: IK  PRN Reason: line flush  Start: 02/17/18 0142   Admin Instructions: for peripheral IV flush post IV meds               Warfarin  Therapy Reminder (Check START DATE - warfarin may be starting in the FUTURE)  Dose: 1 each  Freq: CONTINUOUS PRN Route: XX  Start: 02/17/18 0142   Admin Instructions: *Note to reorder warfarin daily*  Pharmacy Warfarin Dosing Service  Patient is on Warfarin Therapy - check for daily order              Future Medications  Medications 02/15/18 02/16/18 02/17/18 02/18/18 02/19/18 02/20/18 02/21/18       warfarin (COUMADIN) tablet 1 mg  Dose: 1 mg  Freq: ONCE AT 6PM Route: PO  Start: 02/21/18 1800          [ ] 1800          Completed Medications  Medications 02/15/18 02/16/18 02/17/18 02/18/18 02/19/18 02/20/18 02/21/18         Dose: 1 mg  Freq: ONCE AT 6PM Route: PO  Start: 02/20/18 1800   End: 02/20/18 1948         1948 (1 mg)-Given              Dose: 1 mg  Freq: ONCE AT 6PM Route: PO  Start: 02/19/18 1800   End: 02/19/18 1840        1840 (1 mg)-Given               Dose: 1 mg  Freq: ONCE AT 6PM Route: PO  Start: 02/18/18 1800   End: 02/18/18 1858       1858 (1 mg)-Given             Discontinued Medications  Medications 02/15/18 02/16/18 02/17/18 02/18/18 02/19/18 02/20/18 02/21/18         Dose: 25 mg  Freq: 2 TIMES DAILY Route: PO  Start: 02/17/18 2100   End: 02/21/18 0733   Admin Instructions: DO NOT CRUSH. Tablet may be split in half along score line.  Hold for SBP<110 or HR<55       (2004)-Not Given        (0829)-Not Given       (2025)-Not Given        1006 (25 mg)-Given       (2106)-Not Given        1005 (25 mg)-Given       2115 (25 mg)-Given        0733-Med Discontinued         Rate: 75 mL/hr   Freq: CONTINUOUS Route: IV  Start: 02/18/18 1130   End: 02/20/18 0905       1313 ( )-New Bag       2008 ( )-New Bag        0642 ( )-New Bag       1009 ( )-Rate/Dose Change       1840 ( )-New Bag        0905-Med Discontinued

## 2018-02-16 NOTE — LETTER
Transition Communication Hand-off for Care Transitions to Next Level of Care Provider    Name: Antoinette Cowan  MRN #: [id number]  Primary Care Provider: Linda Park     Primary Clinic: [facility] [place] [address]     Reason for Hospitalization:  Warfarin-induced coagulopathy (H) [D68.9, T45.515A]  Anemia due to blood loss, acute [D62]  Laceration of left lower extremity, initial encounter [O59.039F]  Admit Date/Time: 2/16/2018  8:51 PM  Discharge Date  2/21/18  Payor Source: Payor: [employer] / Plan: [employer] [facility] [employer] / Product Type: HMO /     Readmission Assessment Measure (MILTON) Risk Score/category: elevated    Plan of Care Goals/Milestone Events:          Reason for Communication Hand-off Referral: Fragility    Discharge Plan:       Concern for non-adherence with plan of care:   no  Discharge Needs Assessment:  Needs       Most Recent Value    Equipment Currently Used at Home oxygen, walker, rolling    # of Referrals Placed by [facility], Scheduled Follow-up appointments, Communication hand-offs to next level of Care Providers          Follow-up plan:  Future Appointments  Date Time Provider Department Center   2[facility]21/2018 10:30 AM [facility]       Any outstanding tests or procedures:        Referrals     Future Labs/Procedures    Home care nursing referral     Comments:    RN skilled nursing visit. RN to assess incision for signs/symptoms of infection, hydration, nutrition and bowel status and home safety.    You have been discharged with services from [facility]. They will call you to set up initial appointment. If you have questions please call the 24 hr patient line @ [phone]    Home Care OT Referral for Hospital Discharge     Comments:    OT to eval and treat Lymphedema    Your provider has ordered home care - occupational therapy. If you have not been contacted within 2 days of your discharge please call  [phone]     Home Care PT Referral for Hospital Discharge     Comments:    PT to eval and treat for lymphedema and physical deconditioning    .  You have been discharged with services from [facility]. They will call you to set up initial appointment. If you have questions please call the 24 hr patient line @ [phone]            Key Recommendations:  Admitted for knee laceration after a fall.Pt returning to Memory Care unit with HomeCare OT for Lymphedema and RN for wound care. Will need sutures removed at 3/1/18 Appointment    Svetlana Kirkpatrick    AVS/Discharge Summary is the source of truth; this is a helpful guide for improved communication of patient story

## 2018-02-17 PROBLEM — S81.009A OPEN KNEE WOUND: Status: ACTIVE | Noted: 2018-02-17

## 2018-02-17 PROBLEM — S81.019A KNEE LACERATION: Status: ACTIVE | Noted: 2018-02-17

## 2018-02-17 LAB
HGB BLD-MCNC: 8.8 G/DL (ref 11.7–15.7)
INR PPP: 2.83 (ref 0.86–1.14)

## 2018-02-17 PROCEDURE — 36415 COLL VENOUS BLD VENIPUNCTURE: CPT | Performed by: INTERNAL MEDICINE

## 2018-02-17 PROCEDURE — G0378 HOSPITAL OBSERVATION PER HR: HCPCS

## 2018-02-17 PROCEDURE — 25000132 ZZH RX MED GY IP 250 OP 250 PS 637: Performed by: INTERNAL MEDICINE

## 2018-02-17 PROCEDURE — 99219 ZZC INITIAL OBSERVATION CARE,LEVL II: CPT | Performed by: INTERNAL MEDICINE

## 2018-02-17 PROCEDURE — 12000007 ZZH R&B INTERMEDIATE

## 2018-02-17 PROCEDURE — 85610 PROTHROMBIN TIME: CPT | Performed by: INTERNAL MEDICINE

## 2018-02-17 PROCEDURE — 85018 HEMOGLOBIN: CPT | Performed by: INTERNAL MEDICINE

## 2018-02-17 RX ORDER — AMOXICILLIN 250 MG
1 CAPSULE ORAL 2 TIMES DAILY PRN
Status: DISCONTINUED | OUTPATIENT
Start: 2018-02-17 | End: 2018-02-21 | Stop reason: HOSPADM

## 2018-02-17 RX ORDER — PROCHLORPERAZINE 25 MG
12.5 SUPPOSITORY, RECTAL RECTAL EVERY 12 HOURS PRN
Status: DISCONTINUED | OUTPATIENT
Start: 2018-02-17 | End: 2018-02-21 | Stop reason: HOSPADM

## 2018-02-17 RX ORDER — BISACODYL 10 MG
10 SUPPOSITORY, RECTAL RECTAL DAILY PRN
Status: DISCONTINUED | OUTPATIENT
Start: 2018-02-17 | End: 2018-02-21 | Stop reason: HOSPADM

## 2018-02-17 RX ORDER — POTASSIUM CL/LIDO/0.9 % NACL 10MEQ/0.1L
10 INTRAVENOUS SOLUTION, PIGGYBACK (ML) INTRAVENOUS
Status: DISCONTINUED | OUTPATIENT
Start: 2018-02-17 | End: 2018-02-21 | Stop reason: HOSPADM

## 2018-02-17 RX ORDER — LIDOCAINE 50 MG/G
2 PATCH TOPICAL
Status: DISCONTINUED | OUTPATIENT
Start: 2018-02-17 | End: 2018-02-17

## 2018-02-17 RX ORDER — POTASSIUM CHLORIDE 29.8 MG/ML
20 INJECTION INTRAVENOUS
Status: DISCONTINUED | OUTPATIENT
Start: 2018-02-17 | End: 2018-02-21 | Stop reason: HOSPADM

## 2018-02-17 RX ORDER — NALOXONE HYDROCHLORIDE 0.4 MG/ML
.1-.4 INJECTION, SOLUTION INTRAMUSCULAR; INTRAVENOUS; SUBCUTANEOUS
Status: DISCONTINUED | OUTPATIENT
Start: 2018-02-17 | End: 2018-02-21 | Stop reason: HOSPADM

## 2018-02-17 RX ORDER — ONDANSETRON 4 MG/1
4 TABLET, ORALLY DISINTEGRATING ORAL EVERY 6 HOURS PRN
Status: DISCONTINUED | OUTPATIENT
Start: 2018-02-17 | End: 2018-02-21 | Stop reason: HOSPADM

## 2018-02-17 RX ORDER — POTASSIUM CHLORIDE 1500 MG/1
20-40 TABLET, EXTENDED RELEASE ORAL
Status: DISCONTINUED | OUTPATIENT
Start: 2018-02-17 | End: 2018-02-21 | Stop reason: HOSPADM

## 2018-02-17 RX ORDER — WARFARIN SODIUM 1 MG/1
1 TABLET ORAL DAILY
Status: DISCONTINUED | OUTPATIENT
Start: 2018-02-17 | End: 2018-02-17

## 2018-02-17 RX ORDER — AMOXICILLIN 250 MG
2 CAPSULE ORAL 2 TIMES DAILY PRN
Status: DISCONTINUED | OUTPATIENT
Start: 2018-02-17 | End: 2018-02-21 | Stop reason: HOSPADM

## 2018-02-17 RX ORDER — BUMETANIDE 1 MG/1
4 TABLET ORAL 2 TIMES DAILY
Status: DISCONTINUED | OUTPATIENT
Start: 2018-02-17 | End: 2018-02-17

## 2018-02-17 RX ORDER — POLYETHYLENE GLYCOL 3350 17 G/17G
17 POWDER, FOR SOLUTION ORAL DAILY PRN
Status: DISCONTINUED | OUTPATIENT
Start: 2018-02-17 | End: 2018-02-21 | Stop reason: HOSPADM

## 2018-02-17 RX ORDER — POTASSIUM CHLORIDE 7.45 MG/ML
10 INJECTION INTRAVENOUS
Status: DISCONTINUED | OUTPATIENT
Start: 2018-02-17 | End: 2018-02-21 | Stop reason: HOSPADM

## 2018-02-17 RX ORDER — LIDOCAINE 4 G/G
2 PATCH TOPICAL
Status: DISCONTINUED | OUTPATIENT
Start: 2018-02-17 | End: 2018-02-21 | Stop reason: HOSPADM

## 2018-02-17 RX ORDER — LEVOTHYROXINE SODIUM 50 UG/1
50 TABLET ORAL DAILY
Status: DISCONTINUED | OUTPATIENT
Start: 2018-02-17 | End: 2018-02-21 | Stop reason: HOSPADM

## 2018-02-17 RX ORDER — METOPROLOL SUCCINATE 25 MG/1
25 TABLET, EXTENDED RELEASE ORAL 2 TIMES DAILY
Status: DISCONTINUED | OUTPATIENT
Start: 2018-02-17 | End: 2018-02-21

## 2018-02-17 RX ORDER — POTASSIUM CHLORIDE 1.5 G/1.58G
20-40 POWDER, FOR SOLUTION ORAL
Status: DISCONTINUED | OUTPATIENT
Start: 2018-02-17 | End: 2018-02-21 | Stop reason: HOSPADM

## 2018-02-17 RX ORDER — ONDANSETRON 2 MG/ML
4 INJECTION INTRAMUSCULAR; INTRAVENOUS EVERY 6 HOURS PRN
Status: DISCONTINUED | OUTPATIENT
Start: 2018-02-17 | End: 2018-02-21 | Stop reason: HOSPADM

## 2018-02-17 RX ORDER — LIDOCAINE 40 MG/G
CREAM TOPICAL
Status: DISCONTINUED | OUTPATIENT
Start: 2018-02-17 | End: 2018-02-21 | Stop reason: HOSPADM

## 2018-02-17 RX ORDER — SACCHAROMYCES BOULARDII 250 MG
250 CAPSULE ORAL DAILY
COMMUNITY

## 2018-02-17 RX ORDER — ACETAMINOPHEN 500 MG
1000 TABLET ORAL EVERY 8 HOURS
Status: DISCONTINUED | OUTPATIENT
Start: 2018-02-17 | End: 2018-02-21 | Stop reason: HOSPADM

## 2018-02-17 RX ORDER — WARFARIN SODIUM 1 MG/1
1 TABLET ORAL
Status: COMPLETED | OUTPATIENT
Start: 2018-02-17 | End: 2018-02-17

## 2018-02-17 RX ORDER — AMOXICILLIN 250 MG
1 CAPSULE ORAL DAILY PRN
COMMUNITY

## 2018-02-17 RX ORDER — METOPROLOL SUCCINATE 50 MG/1
50 TABLET, EXTENDED RELEASE ORAL 2 TIMES DAILY
Status: DISCONTINUED | OUTPATIENT
Start: 2018-02-17 | End: 2018-02-17

## 2018-02-17 RX ORDER — ACETAMINOPHEN 325 MG/1
650 TABLET ORAL EVERY 4 HOURS PRN
Status: DISCONTINUED | OUTPATIENT
Start: 2018-02-17 | End: 2018-02-21 | Stop reason: HOSPADM

## 2018-02-17 RX ORDER — BUMETANIDE 1 MG/1
4 TABLET ORAL DAILY
Status: DISCONTINUED | OUTPATIENT
Start: 2018-02-17 | End: 2018-02-17

## 2018-02-17 RX ORDER — PROCHLORPERAZINE MALEATE 5 MG
5 TABLET ORAL EVERY 6 HOURS PRN
Status: DISCONTINUED | OUTPATIENT
Start: 2018-02-17 | End: 2018-02-21 | Stop reason: HOSPADM

## 2018-02-17 RX ORDER — ACETAMINOPHEN 650 MG/1
650 SUPPOSITORY RECTAL EVERY 4 HOURS PRN
Status: DISCONTINUED | OUTPATIENT
Start: 2018-02-17 | End: 2018-02-21 | Stop reason: HOSPADM

## 2018-02-17 RX ADMIN — LIDOCAINE 2 PATCH: 560 PATCH PERCUTANEOUS; TOPICAL; TRANSDERMAL at 08:10

## 2018-02-17 RX ADMIN — METOPROLOL SUCCINATE 50 MG: 50 TABLET, EXTENDED RELEASE ORAL at 08:10

## 2018-02-17 RX ADMIN — Medication 1 MG: at 21:30

## 2018-02-17 RX ADMIN — BUMETANIDE 4 MG: 1 TABLET ORAL at 08:10

## 2018-02-17 RX ADMIN — WARFARIN SODIUM 1 MG: 1 TABLET ORAL at 18:03

## 2018-02-17 RX ADMIN — LEVOTHYROXINE SODIUM 50 MCG: 50 TABLET ORAL at 08:10

## 2018-02-17 RX ADMIN — ACETAMINOPHEN 1000 MG: 500 TABLET, FILM COATED ORAL at 18:03

## 2018-02-17 RX ADMIN — ACETAMINOPHEN 1000 MG: 500 TABLET, FILM COATED ORAL at 11:14

## 2018-02-17 ASSESSMENT — ACTIVITIES OF DAILY LIVING (ADL)
DRESS: 3-->ASSISTIVE EQUIPMENT AND PERSON
TRANSFERRING: 3-->ASSISTIVE EQUIPMENT AND PERSON
NUMBER_OF_TIMES_PATIENT_HAS_FALLEN_WITHIN_LAST_SIX_MONTHS: 1
BATHING: 3-->ASSISTIVE EQUIPMENT AND PERSON
SWALLOWING: 0-->SWALLOWS FOODS/LIQUIDS WITHOUT DIFFICULTY
RETIRED_EATING: 1-->ASSISTIVE EQUIPMENT
FALL_HISTORY_WITHIN_LAST_SIX_MONTHS: YES
RETIRED_COMMUNICATION: 0-->UNDERSTANDS/COMMUNICATES WITHOUT DIFFICULTY
TOILETING: 3-->ASSISTIVE EQUIPMENT AND PERSON
WHICH_OF_THE_ABOVE_FUNCTIONAL_RISKS_HAD_A_RECENT_ONSET_OR_CHANGE?: AMBULATION
AMBULATION: 3-->ASSISTIVE EQUIPMENT AND PERSON
COGNITION: 1 - ATTENTION OR MEMORY DEFICITS

## 2018-02-17 ASSESSMENT — ENCOUNTER SYMPTOMS
VOMITING: 0
NECK PAIN: 0
BACK PAIN: 0
HEADACHES: 0

## 2018-02-17 NOTE — ED PROVIDER NOTES
History     Chief Complaint:  Leg Laceration     History limited due to patient's dementia and subsequently provided by EMS.   HPI   Antoinette Cowan is a 89 year old female with history of dementia and anemia on coumadin who presents to the emergency department today via EMS for evaluation of a leg laceration. Per EMS report the patient reportedly was her home assisted living facility hen she fell out of her chair and sustained a laceration to her left lower leg. They attempted to manage the patient's bleeding at home with compression, but were unable. They estimated the patient to lose 300mL of blood. Patient also reportedly was placed on her normal 1L nasal cannulated oxygen.     Allergies:  Ciprofloxacin  Vicodin [Hydrocodone-Acetaminophen]      Medications:    oxyCODONE IR (ROXICODONE) 5 MG tablet  acetaminophen (TYLENOL) 500 MG tablet  senna-docusate (SENOKOT-S;PERICOLACE) 8.6-50 MG per tablet  lidocaine (LIDODERM) 5 % Patch  ondansetron (ZOFRAN ODT) 4 MG ODT tab  LEVOTHYROXINE SODIUM PO  polyethylene glycol (MIRALAX/GLYCOLAX) Packet  Potassium Chloride Magdalene ER (K-DUR PO)  Bumetanide (BUMEX PO)  warfarin (COUMADIN) 2 MG tablet  SIMVASTATIN PO  nystatin (MYCOSTATIN) 704786 UNIT/GM POWD  metoprolol (TOPROL XL) 50 MG 24 hr tablet  Calcium Carbonate-Vitamin D (CALTRATE 600+D PO)  CHOLECALCIFEROL  hydroxyurea (HYDREA) 500 MG capsule    Past Medical History:    Anemia   Atrial fibrillation  Breast cancer  Chronic pain  DVT  Hyperlipidemia   Hypothyroidism   Thrombocytosis     Past Surgical History:    Left knee total replacement  kyphoplasty   Igor nitinol filter    Family History:    History reviewed. No pertinent family history.      Social History:  The patient was accompanied to the ED by EMS.  Smoking Status: Never smoker  Smokeless Tobacco: No  Alcohol Use: No    Marital Status:        Review of Systems   Cardiovascular: Positive for leg swelling.   Gastrointestinal: Negative for vomiting.    Musculoskeletal: Negative for back pain and neck pain.   Skin: Positive for wound (large lower left leg laceration).   Neurological: Negative for headaches.   All other systems reviewed and are negative.    Physical Exam     Patient Vitals for the past 24 hrs:   BP Temp Temp src Heart Rate SpO2   02/16/18 2054 96/53 97.5  F (36.4  C) Temporal 77 93 %      Physical Exam  General: Alert, interactive in mild distress  Head:  Scalp is atraumatic  Eyes:  The pupils are equal, round, and reactive to light    EOM's intact    No scleral icterus  ENT:      Nose:  The external nose is normal  Ears:  External ears are normal  Mouth/Throat: The oropharynx is normal    Mucus membranes are moist.       Neck:  Normal range of motion.      There is no rigidity.    Trachea is in the midline         CV:  Regular rate and rhythm    No murmur   Resp:  Breath sounds are clear bilaterally    Non-labored, no retractions or accessory muscle use      GI:  Abdomen is soft, no distension, no tenderness.       MS:  Normal strength in all 4 extremities, No midline cervical, thoracic, or lumbar tenderness  Skin:  Laceration anterior left lower extremity, as shown below.                 Neuro: Strength 4/5 x4.  Sensation intact  In all 4 extremities.      GCS: 14 baseline confusion  Psych:  Awake. Alert.  Normal affect.        Emergency Department Course     Laboratory:  Laboratory findings were communicated with the patient and her caregiver who voiced understanding of the findings.    CBC: WBC 4.4, HGB 9.3 (L),   INR: 2.87 (H)     Procedures:     Laceration Repair      LACERATION:  A complex clean 9 cm vertical and 16 cm horizontal laceration (as seen in photos above).     LOCATION:  Left lower leg.    FUNCTION:  Distally sensation, circulation and tendon function are intact.    ANESTHESIA:  Local using 1% lidocaine with epinephrine total of 25 mLs.    PREPARATION:  Irrigation and Scrubbing with Normal Saline and Shur  Sung.    DEBRIDEMENT:  wound explored, no foreign body found.    CLOSURE:  Wound was closed with One Layer.  Skin closed with Steri-strips, and a combination of 23 x 3.0 Ethylon x 4.0 Ethylon using combination of horizontal mattress and simple interrupted sutures.    Emergency Department Course:  Nursing notes and vitals reviewed.  2055 My PA student evaluated the patient and gathered a history.   2125 I performed an exam of the patient as documented above.   IV was inserted and blood was drawn for laboratory testing, results above.   2149 I updated the patient on her blood work results and performed a laceration repair as documented above.   2325 I spoke with Dr. Devlin of the Hospitalist service  regarding patient's presentation, findings, and plan of care.   I discussed the treatment plan with the patient and her caregiver. They expressed understanding of this plan and consented to admission. I discussed the patient with Dr. Devlin, who will admit the patient to a monitored bed for further evaluation and treatment. I personally reviewed the laboratory results with the Patient and her caregiver and answered all related questions prior to admission.   Impression & Plan      Medical Decision Making:  Ms. Cowan was seen and evaluated and the above workup and laceration repair were undertaken as noted above. The wound is quite complex. The patient has significant comorbidities and is quite elderly. I believe she would benefit from hospitalization as well as potential surgical evaluation and possible wound team evaluation. Given that the patient lives with her 97 year old  I don't believe she is good candidate for immediate discharge. She will be kept in observation overnight. Her care giver was here throughout the duration of her emergency department course and is in agreement with this plan. Patient does have anemia and this will need to be trended.  There is no sign of head trauma and the patient is at her  baseline mental status, I doubt intracranial hemorrhage.     Diagnosis:    ICD-10-CM    1. Laceration of left lower extremity, initial encounter S81.812A    2. Anemia due to blood loss, acute D62    3. Warfarin-induced coagulopathy (H) D68.9     T45.515A        Disposition:  Admitted under the supervision of Dr. Devlin and colleagues for further treatment.     Scribe Disclosure:  I, Alvaro Kaur, am serving as a scribe at 8:55 PM on 2/16/2018 to document services personally performed by Shiva Norris MD based on my observations and the provider's statements to me.    2/16/2018    EMERGENCY DEPARTMENT       Shiva Norris MD  02/17/18 0028

## 2018-02-17 NOTE — ED NOTES
[facility]  ED Nurse Handoff Report    ED Chief complaint: Laceration      ED Diagnosis:   Final diagnoses:   Laceration of left lower extremity, initial encounter   Anemia due to blood loss, acute   Warfarin-induced coagulopathy (H)       Code Status: DNR / DNI    Allergies:   Allergies   Allergen Reactions     Clindamycin Rash     Ciprofloxacin Hives     Vicodin [Hydrocodone-Acetaminophen] Rash       Activity level - Baseline/Home:  Stand with Assist of 2    Activity Level - Current:   Stand with Assist of 2     Needed?: No    Isolation: No  Infection: Not Applicable    Bariatric?: No    Vital Signs:   Vitals:    02/16/18 2054   BP: 96/53   Temp: 97.5  F (36.4  C)   TempSrc: Temporal   SpO2: 93%       Cardiac Rhythm: ,        Pain level:      Is this patient confused?: Yes    Patient Report: Initial Complaint: Pt.fell off from the chair. No LOC. She has Lt.lower laceration. Estimate blood lost was 300 ml on scene.She is on coumadin  Focused Assessment: leg laceration  Tests Performed: labs  Abnormal Results: INR 2.87  Treatments provided: sutures    Family Comments:     OBS brochure/video discussed/provided to patient: No confused    ED Medications: Medications - No data to display    Drips infusing?:  No      ED NURSE PHONE NUMBER: [phone]

## 2018-02-17 NOTE — PLAN OF CARE
Problem: Patient Care Overview  Goal: Plan of Care/Patient Progress Review  PT - Holding on PT eval until ortho has seen patient.

## 2018-02-17 NOTE — PLAN OF CARE
Problem: Fall Risk (Adult)  Goal: Identify Related Risk Factors and Signs and Symptoms  Related risk factors and signs and symptoms are identified upon initiation of Human [employer] Clinical Practice Guideline (CPG).   Outcome: No Change  Pt alert to self, forgetful, hard of hearing. Pt's  at bedside during morning. A letter for pt's daughter regarding air travel from doctor is on window ledge. Pt admitted from observation status to inpatient. Hgb decreased from 9.3 to 8.8, to be redrawn in AM, daughter will make decision about transfusion at that time if needed. To be seen by ortho. On 1 L O2 at baseline. Developed hematoma at SL IV site, decreased with pressure and cold. On coumadin at assisted living. Has significant edema bilaterally, hx of lymphadema. Continue to monitor.

## 2018-02-17 NOTE — H&P
[facility]    History and Physical  Hospitalist       Date of Admission:  2/16/2018    Assessment & Plan   Antoinette Cowan is a 89 year old female who presents with a witnessed fall at her [facility] facility suffering a large left knee laceration. Admitted for observation following blood loss related to large laceration    Mechanical fall with large left knee laceration: Patient fell after sliding out of a chair at [facility]. Aid was present at time of fall. Large left knee laceration which was irrigated and sutured in the emergency department  -Orthopedic surgery consulted for trauma evaluation given depth of wound over left patella. Discussed with ED provider regarding this.  -Acetaminophen  -Repeat CBC in a.m.  -Monitor for neurologic changes in the setting of fall with anticoagulations. Patient did not hit head or lose consciousness, fall was witnessed.    Cervicalgia: Present prior trauma  -Lidocaine patch     Atrial fibrillation:  -Warfarin, pharmacy to dose  -Continue metoprolol XL 50 mg b.i.d.    History of recurrent DVT: Patient with a history of DVT with recurrence following discontinuation of Coumadin  -Continue warfarin, pharmacy to dose    Lymphedema: Patient with chronic lymphedema bilaterally   -Bumex 4 mg daily as per home regimen    DVT Prophylaxis: Ambulate every shift    Code Status: DNR / DNI    Disposition: Expected discharge likely 2/17/18 afternoon following trauma evaluation.    Jonny Naranjo Gifford    Primary Care Physician   [place] [place]    Chief Complaint   Runny nose    History is obtained from the patient, chart review, patient's aide from care facility at bedside, discussion with Dr. Norris in the emergency department . Patient is not able to provide much of a meaningful history. She knows she is in the hospital, though she is uncertain as to why.      History of Present Illness   Antoinette Cowan is a 89 year old female who presents with a witnessed fall  at a care facility where patient slid forward out of a chair and sustained a large laceration overlying her left knee. Patient with reported 300 mL blood loss, wound was irrigated and sutured by Dr. Norris in the emergency department. Note ED note images of laceration with significant subcutaneous fat extrusion. Patellar Tendon was not exposed per report, though deep laceration. Patient with no complaints of leg pain, is unaware as to why she is in the emergency department currently.    Chronic lymphedema which is stable per aide at bedside. Fall was witnessed by an aide at the bedside, patient did not hit her head or lose consciousness.    She complains of having a runny nose, holds a tissue under her nose constantly. When asked if she has pain, patient states that she has some neck pain. Care assistant states that she has had this in the past as well, maybe slightly more prominent over the past 2 weeks, though present prior to fall.    Past Medical History    I have reviewed this patient's medical history and updated it with pertinent information if needed.   Past Medical History:   Diagnosis Date     Anemia      Atrial fibrillation, new onset (H) 8/25/13     Breast cancer (H) 1997     Chronic pain      DVT (deep venous thrombosis) (H)      Hyperlipaemia      Hypertension      Hypothyroidism      Mixed hyperlipidemia      Thrombocytosis (H)        Past Surgical History   I have reviewed this patient's surgical history and updated it with pertinent information if needed.  Past Surgical History:   Procedure Laterality Date     JOINT REPLACEMENT  7/23/2009    left total knee replacement     KYPHOPLASTY       Igor Nitinol Filter         Prior to Admission Medications   Prior to Admission Medications   Prescriptions Last Dose Informant Patient Reported? Taking?   Bumetanide (BUMEX PO)  Daughter Yes No   Sig: Take 4 mg by mouth daily    CHOLECALCIFEROL  Daughter Yes No   Sig: Take 1,000 Units by mouth daily   Calcium  Carbonate-Vitamin D (CALTRATE 600+D PO)  Daughter Yes No   Sig: Take 1 tablet by mouth daily    LEVOTHYROXINE SODIUM PO  Daughter Yes No   Sig: Take 50 mcg by mouth daily   Potassium Chloride Magdalene ER (K-DUR PO)  Daughter Yes No   Sig: Take 20 mEq by mouth daily   SIMVASTATIN PO  Daughter Yes No   Sig: Take 20 mg by mouth At Bedtime   acetaminophen (TYLENOL) 500 MG tablet   No No   Sig: Take 2 tablets (1,000 mg) by mouth every 8 hours   hydroxyurea (HYDREA) 500 MG capsule  Daughter Yes No   Sig: Take 1,000 mg by mouth daily    lidocaine (LIDODERM) 5 % Patch   No No   Sig: Apply up to 3 patches to painful area at once for up to 12 h within a 24 h period.  Remove after 12 hours.   metoprolol (TOPROL XL) 50 MG 24 hr tablet  Daughter Yes No   Sig: Take 50 mg by mouth 2 times daily   nystatin (MYCOSTATIN) 956614 UNIT/GM POWD  Daughter Yes No   Sig: Apply topically 2 times daily Under breasts   ondansetron (ZOFRAN ODT) 4 MG ODT tab   No No   Sig: Take 1-2 tablets (4-8 mg) by mouth 3 times daily (before meals)   oxyCODONE IR (ROXICODONE) 5 MG tablet   No No   Sig: Take 1 tablet (5 mg) by mouth every 4 hours as needed for moderate to severe pain   polyethylene glycol (MIRALAX/GLYCOLAX) Packet  Daughter Yes No   Sig: Take 1 packet by mouth daily as needed for constipation   senna-docusate (SENOKOT-S;PERICOLACE) 8.6-50 MG per tablet   No No   Sig: Take 1 tablet by mouth 2 times daily as needed for constipation   warfarin (COUMADIN) 2 MG tablet  Daughter Yes No   Sig: Take 1 mg by mouth daily       Facility-Administered Medications: None     Allergies   Allergies   Allergen Reactions     Clindamycin Rash     Ciprofloxacin Hives     Vicodin [Hydrocodone-Acetaminophen] Rash       Social History   I have reviewed this patient's social history and updated it with pertinent information if needed. Antoinette Cowan  reports that she has never smoked. She does not have any smokeless tobacco history on file. She reports that she does not  drink alcohol or use illicit drugs.    Family History   I have reviewed this patient's family history and updated it with pertinent information if needed.   Father with CVA, heart disease and EtOH history    Review of Systems   Patient unable to provide a review of systems secondary to dementia    Physical Exam   Temp: 97.5  F (36.4  C) Temp src: Temporal BP: 103/45   Heart Rate: 77   SpO2: 95 % O2 Device: None (Room air)    Vital Signs with Ranges  Temp:  [97.5  F (36.4  C)] 97.5  F (36.4  C)  Heart Rate:  [77] 77  BP: ()/(45-53) 103/45  SpO2:  [93 %-95 %] 95 %  0 lbs 0 oz    Constitutional: no acute distress, alert, sitting up in bed   Respiratory: breath sounds with prolonged expiration phase, no significant wheezing, bibasilar crackles on inspiration.  Cardiovascular: regular rate and rhythm, no murmur  GI: abdomen soft, non-tender, normoactive bowel sounds, no masses  Lymph/Hematologic:3+ pitting bilateral lower extremity edema with overlying erythema bilaterally. Stable per care assistant at bedside   Musculoskeletal:Diffuse muscular wasting associated with advanced age. Left knee currently wrapped, though note images from emergency department with large deep laceration.   Neurologic: Oriented to place, not to situation.    Data   Data reviewed today:  I personally reviewed no images or EKG's today.    Recent Labs  Lab 02/16/18  2112   WBC 4.4   HGB 9.3*   *      INR 2.87*       No results found for this or any previous visit (from the past 24 hour(s)).

## 2018-02-17 NOTE — PROGRESS NOTES
[facility]  Hospitalist Progress Note  Davian Sims MD    Assessment & Plan   Antoinette Cowan is a 89 year old female with PMHx significant for dementia, chronic atrial fibrillation, breast cancer, severe pulmonary HTN with 2-3+ TR, lower extremity lymphedema, DVT, DLP, HTN, CKD stage 3 (baseline cr 1-1.2), hypothyroidism, and thrombocytosis who presented with a witnessed fall at her [facility] facility suffering a large left shin laceration. Admitted for observation following blood loss related to large laceration.     Mechanical fall with large left anterior shin laceration,  Acute blood loss anemia, secondary to skin laceration:   Patient fell after sliding out of a chair at [facility]. Aide was present at time of fall. Large left shin laceration which was irrigated and sutured in the emergency department.  BP is running soft.      Recent Labs  Lab 02/17/18  0945 02/16/18  2112   HGB 8.8* 9.3*       -Orthopedic surgery consulted for trauma evaluation given depth of wound over left patella.   -Acetaminophen prn  -Repeat Hgb in a.m. (daughter will make a decision in am if transfusion indicated)  -Monitor for neurologic changes in the setting of fall with anticoagulations. Patient did not hit head or lose consciousness, fall was witnessed.  -Fall precautions  -PT assessment     Cervicalgia:   Present prior to trauma  -Lidocaine patch     HTN:  BP is running soft.  [PTA metoprolol XL 50 mg po bid and Bumex 4 mg po bid]  -Decreased PTA metoprolol to 25 mg po bid for soft BP as well as bradycardia  -Hold PTA Bumex  -Continue to monitor BP    Hypothyroidism:  -Continue PTA levothyroxine     Chronic atrial fibrillation:  Rate is controlled.  -Warfarin, pharmacy to dose  -Continue metoprolol XL 50 mg b.i.d with hold parameters     History of recurrent DVT:   Patient with a history of DVT with recurrence following discontinuation of Coumadin  -Continue warfarin, pharmacy to  dose     Lymphedema:   Patient with chronic lymphedema bilaterally   -Hold PTA Bumex 4 mg bid due to soft blood pressures    Dementia:  At baseline.    # Pain Assessment:   Current Pain Score 2/17/2018 2/17/2018 12/18/2017   Patient currently in pain? denies denies -   Pain score (0-10) - - 2   Pain location - - -   Pain descriptors - - -   Antoinette perkins pain level was assessed and she currently denies pain.        Active Diet Order      Regular Diet Adult      DVT Prophylaxis: Ambulate every shift  Code Status: DNR / DNI  Disposition: Expected discharge likely 02/18.    D/W RN, patient's aide, daughter, and care coordinator.    No charge for today's visit.    Interval History   Pain is controlled. BP is running soft. Hgb down to 8.8. No new event overnight.    Data reviewed today: I reviewed all new labs and imaging over the last 24 hours.    Physical Exam   Temp: 97.6  F (36.4  C) Temp src: Temporal BP: 97/44 Pulse: 60 Heart Rate: 49 Resp: 18 SpO2: 95 % O2 Device: Nasal cannula Oxygen Delivery: 2 LPM  There were no vitals filed for this visit.  Vital Signs with Ranges  Temp:  [97.5  F (36.4  C)-97.6  F (36.4  C)] 97.6  F (36.4  C)  Pulse:  [60] 60  Heart Rate:  [49-77] 49  Resp:  [18] 18  BP: ()/(44-53) 97/44  SpO2:  [93 %-99 %] 95 %  I/O's Last 24 hours       Constitutional: Comfortable, no acute distress  HEENT: No conjunctival pallor.  Neurologic: Awake, alert, oriented x1. Hard of hearing. No focal weakness  Neck: Supple, no elevated JVP  Respiratory: Clear to auscultation  Cardiovascular: Normal S1 and S2. Irregularly irregular rhythm, normal rate  GI: Abdomen soft, non tender, non distended  Extremities: No calf tenderness, 2-3+ B/L LE edema  Skin/integument: Left knee skin wound is dressed.    Medications   All medications were reviewed.    Warfarin Therapy Reminder         bumetanide  4 mg Oral Daily     acetaminophen  1,000 mg Oral Q8H     levothyroxine (SYNTHROID/LEVOTHROID) tablet 50 mcg  50 mcg Oral  Daily     metoprolol succinate  50 mg Oral BID     sodium chloride (PF)  3 mL Intracatheter Q8H     lidocaine  2 patch Transdermal Q24H     lidocaine   Transdermal Q24h     lidocaine   Transdermal Q8H        Data     Recent Labs  Lab 02/17/18  0945 02/16/18 2112   WBC  --  4.4   HGB 8.8* 9.3*   MCV  --  125*   PLT  --  262   INR  --  2.87*       No results found for this or any previous visit (from the past 24 hour(s)).

## 2018-02-17 NOTE — PHARMACY-ADMISSION MEDICATION HISTORY
Admission medication history interview status for the 2/16/2018  admission is complete. See EPIC admission navigator for prior to admission medications     Medication history source reliability:Good    Actions taken by pharmacist (provider contacted, etc):  Reviewed medication list provided by Ricarda.      Additional medication history information not noted on PTA med list :None    Medication reconciliation/reorder completed by provider prior to medication history? Yes    Time spent in this activity: 20 minutes    Prior to Admission medications    Medication Sig Last Dose Taking? Auth Provider   saccharomyces boulardii (FLORASTOR) 250 MG capsule Take 250 mg by mouth daily 2/17/2018 at 0800 Yes Unknown, Entered By History   METOLAZONE PO Take 2.5 mg by mouth daily X 3 doses (started 2/14/18)  Yes Unknown, Entered By History   WARFARIN SODIUM PO Take 1 mg by mouth daily  Yes Unknown, Entered By History   ACETAMINOPHEN PO Take 1,000 mg by mouth every 6 hours as needed for pain or fever  at prn Yes Unknown, Entered By History   IBUPROFEN PO Take 400 mg by mouth every 6 hours as needed for moderate pain  at prn Yes Unknown, Entered By History   senna-docusate (SENOKOT-S;PERICOLACE) 8.6-50 MG per tablet Take 1 tablet by mouth daily as needed for constipation  at prn Yes Unknown, Entered By History   oxyCODONE IR (ROXICODONE) 5 MG tablet Take 1 tablet (5 mg) by mouth every 4 hours as needed for moderate to severe pain  at prn Yes Lamont Singh,    lidocaine (LIDODERM) 5 % Patch Apply up to 3 patches to painful area at once for up to 12 h within a 24 h period.  Remove after 12 hours.  at prn Yes Lamont Singh, DO   LEVOTHYROXINE SODIUM PO Take 50 mcg by mouth daily 2/17/2018 at 0800 Yes Unknown, Entered By History   polyethylene glycol (MIRALAX/GLYCOLAX) Packet Take 1 packet by mouth every other day   Yes Unknown, Entered By History   Potassium Chloride Magdalene ER (K-DUR PO) Take 20 mEq by mouth daily  2/17/2018 at 0800 Yes Unknown, Entered By History   Bumetanide (BUMEX PO) Take 4 mg by mouth 2 times daily   Yes Unknown, Entered By History   SIMVASTATIN PO Take 20 mg by mouth At Bedtime  Yes Unknown, Entered By History   nystatin (MYCOSTATIN) 701425 UNIT/GM POWD Apply topically 2 times daily Under breasts  Yes Unknown, Entered By History   metoprolol (TOPROL XL) 50 MG 24 hr tablet Take 50 mg by mouth 2 times daily 2/17/2018 at Unknown time Yes Reported, Patient   hydroxyurea (HYDREA) 500 MG capsule Take 1,000 mg by mouth daily  2/16/2018 at 1900 Yes Reported, Patient     Mary Lou Gaspar, PharmD, BCPS

## 2018-02-17 NOTE — ED NOTES
Pt.fell off from the chair. No LOC. She has Lt.lower laceration. Estimate blood lost was 300 ml on scene.She is on coumadin.

## 2018-02-17 NOTE — PROVIDER NOTIFICATION
Brief note, full H&P pending    Patient presents for her memory care facility with a fall which was witnessed. Patient fell forward and hit her knee on a chair well within aide heard large left knee laceration. See ED note images. Admitted for monitoring following approximately 300 mL blood loss prior to evaluation in the emergency department.    Wound was irrigated and sutured by Dr. Norris in the emergency department.    Orthopedic surgery consulted for trauma evaluation and wound care recommendations  Repeat hemoglobin in a.m.    Jonny Devlin MD  5:13 AM

## 2018-02-17 NOTE — PROGRESS NOTES
.RECEIVING UNIT ED HANDOFF REVIEW    ED Nurse Handoff Report was reviewed by: Priscilla Ellis on February 17, 2018 at 1:04 AM

## 2018-02-18 ENCOUNTER — APPOINTMENT (OUTPATIENT)
Dept: GENERAL RADIOLOGY | Facility: CLINIC | Age: 83
DRG: 605 | End: 2018-02-18
Attending: ORTHOPAEDIC SURGERY
Payer: COMMERCIAL

## 2018-02-18 ENCOUNTER — APPOINTMENT (OUTPATIENT)
Dept: GENERAL RADIOLOGY | Facility: CLINIC | Age: 83
DRG: 605 | End: 2018-02-18
Attending: INTERNAL MEDICINE
Payer: COMMERCIAL

## 2018-02-18 ENCOUNTER — APPOINTMENT (OUTPATIENT)
Dept: PHYSICAL THERAPY | Facility: CLINIC | Age: 83
DRG: 605 | End: 2018-02-18
Attending: INTERNAL MEDICINE
Payer: COMMERCIAL

## 2018-02-18 LAB
ABO + RH BLD: NORMAL
ABO + RH BLD: NORMAL
ANION GAP SERPL CALCULATED.3IONS-SCNC: 8 MMOL/L (ref 3–14)
BLD GP AB SCN SERPL QL: NORMAL
BLOOD BANK CMNT PATIENT-IMP: NORMAL
BUN SERPL-MCNC: 49 MG/DL (ref 7–30)
CALCIUM SERPL-MCNC: 8.4 MG/DL (ref 8.5–10.1)
CHLORIDE SERPL-SCNC: 98 MMOL/L (ref 94–109)
CO2 SERPL-SCNC: 31 MMOL/L (ref 20–32)
CREAT SERPL-MCNC: 1.91 MG/DL (ref 0.52–1.04)
ERYTHROCYTE [DISTWIDTH] IN BLOOD BY AUTOMATED COUNT: 15.1 % (ref 10–15)
GFR SERPL CREATININE-BSD FRML MDRD: 25 ML/MIN/1.7M2
GLUCOSE SERPL-MCNC: 127 MG/DL (ref 70–99)
HCT VFR BLD AUTO: 24 % (ref 35–47)
HGB BLD-MCNC: 7.8 G/DL (ref 11.7–15.7)
HGB BLD-MCNC: 8.7 G/DL (ref 11.7–15.7)
INR PPP: 2.93 (ref 0.86–1.14)
MCH RBC QN AUTO: 40 PG (ref 26.5–33)
MCHC RBC AUTO-ENTMCNC: 32.5 G/DL (ref 31.5–36.5)
MCV RBC AUTO: 123 FL (ref 78–100)
PLATELET # BLD AUTO: 221 10E9/L (ref 150–450)
POTASSIUM SERPL-SCNC: 3.7 MMOL/L (ref 3.4–5.3)
RBC # BLD AUTO: 1.95 10E12/L (ref 3.8–5.2)
SODIUM SERPL-SCNC: 137 MMOL/L (ref 133–144)
SPECIMEN EXP DATE BLD: NORMAL
WBC # BLD AUTO: 5.1 10E9/L (ref 4–11)

## 2018-02-18 PROCEDURE — 72100 X-RAY EXAM L-S SPINE 2/3 VWS: CPT

## 2018-02-18 PROCEDURE — 36415 COLL VENOUS BLD VENIPUNCTURE: CPT | Performed by: INTERNAL MEDICINE

## 2018-02-18 PROCEDURE — 25000128 H RX IP 250 OP 636: Performed by: INTERNAL MEDICINE

## 2018-02-18 PROCEDURE — 80048 BASIC METABOLIC PNL TOTAL CA: CPT | Performed by: INTERNAL MEDICINE

## 2018-02-18 PROCEDURE — 25000132 ZZH RX MED GY IP 250 OP 250 PS 637: Performed by: INTERNAL MEDICINE

## 2018-02-18 PROCEDURE — 97530 THERAPEUTIC ACTIVITIES: CPT | Mod: GP | Performed by: PHYSICAL THERAPIST

## 2018-02-18 PROCEDURE — 86901 BLOOD TYPING SEROLOGIC RH(D): CPT | Performed by: INTERNAL MEDICINE

## 2018-02-18 PROCEDURE — 85018 HEMOGLOBIN: CPT | Performed by: INTERNAL MEDICINE

## 2018-02-18 PROCEDURE — 85610 PROTHROMBIN TIME: CPT | Performed by: INTERNAL MEDICINE

## 2018-02-18 PROCEDURE — 73590 X-RAY EXAM OF LOWER LEG: CPT | Mod: LT

## 2018-02-18 PROCEDURE — 12000007 ZZH R&B INTERMEDIATE

## 2018-02-18 PROCEDURE — 40000193 ZZH STATISTIC PT WARD VISIT: Performed by: PHYSICAL THERAPIST

## 2018-02-18 PROCEDURE — 85027 COMPLETE CBC AUTOMATED: CPT | Performed by: INTERNAL MEDICINE

## 2018-02-18 PROCEDURE — 86850 RBC ANTIBODY SCREEN: CPT | Performed by: INTERNAL MEDICINE

## 2018-02-18 PROCEDURE — 86900 BLOOD TYPING SEROLOGIC ABO: CPT | Performed by: INTERNAL MEDICINE

## 2018-02-18 PROCEDURE — 99233 SBSQ HOSP IP/OBS HIGH 50: CPT | Performed by: INTERNAL MEDICINE

## 2018-02-18 PROCEDURE — 97161 PT EVAL LOW COMPLEX 20 MIN: CPT | Mod: GP | Performed by: PHYSICAL THERAPIST

## 2018-02-18 RX ORDER — WARFARIN SODIUM 1 MG/1
1 TABLET ORAL
Status: COMPLETED | OUTPATIENT
Start: 2018-02-18 | End: 2018-02-18

## 2018-02-18 RX ORDER — SODIUM CHLORIDE 9 MG/ML
INJECTION, SOLUTION INTRAVENOUS CONTINUOUS
Status: DISCONTINUED | OUTPATIENT
Start: 2018-02-18 | End: 2018-02-20

## 2018-02-18 RX ORDER — CEPHALEXIN 250 MG/5ML
500 POWDER, FOR SUSPENSION ORAL 3 TIMES DAILY
Status: DISCONTINUED | OUTPATIENT
Start: 2018-02-18 | End: 2018-02-21 | Stop reason: HOSPADM

## 2018-02-18 RX ADMIN — LIDOCAINE 2 PATCH: 560 PATCH PERCUTANEOUS; TOPICAL; TRANSDERMAL at 08:14

## 2018-02-18 RX ADMIN — ACETAMINOPHEN 1000 MG: 500 TABLET, FILM COATED ORAL at 11:04

## 2018-02-18 RX ADMIN — ACETAMINOPHEN 1000 MG: 500 TABLET, FILM COATED ORAL at 18:57

## 2018-02-18 RX ADMIN — CEPHALEXIN 500 MG: 250 POWDER, FOR SUSPENSION ORAL at 22:12

## 2018-02-18 RX ADMIN — CEPHALEXIN 500 MG: 250 POWDER, FOR SUSPENSION ORAL at 16:25

## 2018-02-18 RX ADMIN — ACETAMINOPHEN 1000 MG: 500 TABLET, FILM COATED ORAL at 03:44

## 2018-02-18 RX ADMIN — SODIUM CHLORIDE: 9 INJECTION, SOLUTION INTRAVENOUS at 13:13

## 2018-02-18 RX ADMIN — LEVOTHYROXINE SODIUM 50 MCG: 50 TABLET ORAL at 08:27

## 2018-02-18 RX ADMIN — WARFARIN SODIUM 1 MG: 1 TABLET ORAL at 18:58

## 2018-02-18 RX ADMIN — SODIUM CHLORIDE: 9 INJECTION, SOLUTION INTRAVENOUS at 20:08

## 2018-02-18 NOTE — PLAN OF CARE
Problem: Patient Care Overview  Goal: Plan of Care/Patient Progress Review  Outcome: No Change  Pt alert to self. VSS on 1 L NC with soft BP's. Metoprolol held. Pt on tele. Pt can be forgetful. Hgb decreased from 8.8 to 7.8. Hgb redraws ordered. Creatinine 1.91. Transfusion pending Hgb below 7. Consent signed by daughter. XR on lumbar and LLE today, results pending. New IV in L forearm, fluids 100 ml/h. Pt has significant edema bilaterally, hx of lymphadema. Dressing changed on LLE laceration today. Dressing CDI. Pt repositioned/turned Q2H. Pt Ax2 and lift. Barrier cream applied to coccyx/sacral area with changes. Pt tolerating regular diet. Pt has back pain managed with scheduled Tyelnol. Will continue to monitor.

## 2018-02-18 NOTE — CONSULTS
[facility] Orthopedic Consultation    Antoinette Cowan MRN# [id number]   Age: 89 year old YOB: 1928     Date of Admission:  2/16/2018    Reason for consult: LLE laceration       Requesting physician: Dr. Devlin       Level of consult: Consult, follow and place orders           Assessment and Plan:   Assessment:   LLE laceration, sewed up in ED  No evidence of bony injury, however, recommend XRs to r/o fracture      Plan:   - XR L tib/fib  - rest of recommendations pending XRs, if no fx, will recommend WBAT, PT/OT, abx per primary for laceration, pain control             Chief Complaint:   L tibia laceration         History of Present Illness:   This patient is a 89 year old female who presents with the following condition requiring a hospital admission:    88 yo F who lives in [facility] care facility who had a fall on 2/16/18. She fell and had a large laceration over her L tibia with copious amount of blood loss. She was able to bear weight after and was taken to the ED. Wound was irrigated and sutured. No xrays were performed and pateint was admitted to the hospital for monitoring. Of note, patient is on coumadin. Denies any other injury. She is usually on oxygen. Patient also has known lymphedema. She denies any numbness/tingling/radiating signs. No associated injuries.            Past Medical History:     Past Medical History:   Diagnosis Date     Anemia      Atrial fibrillation, new onset (H) 8/25/13     Breast cancer (H) 1997     Chronic pain      DVT (deep venous thrombosis) (H)      Hyperlipaemia      Hypertension      Hypothyroidism      Mixed hyperlipidemia      Thrombocytosis (H)              Past Surgical History:     Past Surgical History:   Procedure Laterality Date     JOINT REPLACEMENT  7/23/2009    left total knee replacement     KYPHOPLASTY       Igor Nitinol Filter               Social History:     Social History   Substance Use Topics     Smoking status: Never Smoker     Smokeless  tobacco: Not on file     Alcohol use No             Family History:   No family history on file.          Immunizations:     VACCINE/DOSE   Diptheria   DPT   DTAP   HBIG   Hepatitis A   Hepatitis B   HIB   Influenza   Measles   Meningococcal   MMR   Mumps   Pneumococcal   Polio   Rubella   Small Pox   TDAP   Varicella   Zoster             Allergies:     Allergies   Allergen Reactions     Clindamycin Rash     Ciprofloxacin Hives     Vicodin [Hydrocodone-Acetaminophen] Rash             Medications:     Current Facility-Administered Medications   Medication     acetaminophen (TYLENOL) tablet 1,000 mg     levothyroxine (SYNTHROID/LEVOTHROID) tablet 50 mcg     Warfarin Therapy Reminder (Check START DATE - warfarin may be starting in the FUTURE)     acetaminophen (TYLENOL) tablet 650 mg     acetaminophen (TYLENOL) Suppository 650 mg     naloxone (NARCAN) injection 0.1-0.4 mg     melatonin tablet 1 mg     ondansetron (ZOFRAN-ODT) ODT tab 4 mg    Or     ondansetron (ZOFRAN) injection 4 mg     lidocaine 1 % 1 mL     lidocaine (LMX4) cream     sodium chloride (PF) 0.9% PF flush 3 mL     sodium chloride (PF) 0.9% PF flush 3 mL     senna-docusate (SENOKOT-S;PERICOLACE) 8.6-50 MG per tablet 1 tablet    Or     senna-docusate (SENOKOT-S;PERICOLACE) 8.6-50 MG per tablet 2 tablet     polyethylene glycol (MIRALAX/GLYCOLAX) Packet 17 g     bisacodyl (DULCOLAX) Suppository 10 mg     prochlorperazine (COMPAZINE) injection 5 mg    Or     prochlorperazine (COMPAZINE) tablet 5 mg    Or     prochlorperazine (COMPAZINE) Suppository 12.5 mg     Lidocaine (LIDOCARE) 4 % Patch 2 patch     lidocaine patch REMOVAL     lidocaine patch in PLACE     potassium chloride SA (K-DUR/KLOR-CON M) CR tablet 20-40 mEq     potassium chloride (KLOR-CON) Packet 20-40 mEq     potassium chloride 10 mEq in 100 mL sterile water intermittent infusion (premix)     potassium chloride 10 mEq in 100 mL intermittent infusion with 10 mg lidocaine     potassium chloride 20  mEq in 50 mL intermittent infusion     metoprolol succinate (TOPROL-XL) 24 hr tablet 25 mg             Review of Systems:   All review of systems was performed and was negative except as per hpi         Physical Exam:   All vitals have been reviewed  Patient Vitals for the past 24 hrs:   BP Temp Temp src Pulse Heart Rate Resp SpO2   02/17/18 1614 96/53 98.1  F (36.7  C) Oral 57 62 16 100 %   02/17/18 0726 97/44 97.6  F (36.4  C) Temporal - 49 18 95 %   02/17/18 0149 95/45 97.6  F (36.4  C) Temporal 60 54 18 99 %   02/17/18 0048 - - - - - - 95 %   02/17/18 0047 103/45 - - - - - 93 %   02/16/18 2054 96/53 97.5  F (36.4  C) Temporal - 77 - 93 %       Intake/Output Summary (Last 24 hours) at 02/17/18 1844  Last data filed at 02/17/18 1616   Gross per 24 hour   Intake              160 ml   Output                0 ml   Net              160 ml     NAD  nonlabored breathing on oxygen  +peripheral edema and lymphedema  Skin with laceration that is sewn up over the left distal tib/fib  White sclera  LLE:  Laceration with sutures in place  Mild ttp  +Df/PF/EHL  Sensation intact throughout  +lymphedema            Data:   All laboratory data reviewed  Results for orders placed or performed during the hospital encounter of 02/16/18   INR   Result Value Ref Range    INR 2.87 (H) 0.86 - 1.14   CBC with platelets differential   Result Value Ref Range    WBC 4.4 4.0 - 11.0 10e9/L    RBC Count 2.31 (L) 3.8 - 5.2 10e12/L    Hemoglobin 9.3 (L) 11.7 - 15.7 g/dL    Hematocrit 28.9 (L) 35.0 - 47.0 %     (H) 78 - 100 fl    MCH 40.3 (H) 26.5 - 33.0 pg    MCHC 32.2 31.5 - 36.5 g/dL    RDW 15.3 (H) 10.0 - 15.0 %    Platelet Count 262 150 - 450 10e9/L    Diff Method Automated Method     % Neutrophils 65.7 %    % Lymphocytes 21.1 %    % Monocytes 5.9 %    % Eosinophils 5.9 %    % Basophils 0.9 %    % Immature Granulocytes 0.5 %    Nucleated RBCs 1 (H) 0 /100    Absolute Neutrophil 2.9 1.6 - 8.3 10e9/L    Absolute Lymphocytes 0.9 0.8 - 5.3  10e9/L    Absolute Monocytes 0.3 0.0 - 1.3 10e9/L    Absolute Eosinophils 0.3 0.0 - 0.7 10e9/L    Absolute Basophils 0.0 0.0 - 0.2 10e9/L    Abs Immature Granulocytes 0.0 0 - 0.4 10e9/L    Absolute Nucleated RBC 0.0    Hemoglobin   Result Value Ref Range    Hemoglobin 8.8 (L) 11.7 - 15.7 g/dL   INR   Result Value Ref Range    INR 2.83 (H) 0.86 - 1.14     Time spent on consult: 30 minutes     Karan Shaw MD

## 2018-02-18 NOTE — PROGRESS NOTES
02/18/18 1600   Quick Adds   Type of Visit Initial PT Evaluation   Living Environment   Lives With facility resident   Living Arrangements assisted living   Home Accessibility no concerns   Living Environment Comment Pt lives in memory care [facility], spouse lives upstairs in same building.   Self-Care   Usual Activity Tolerance fair   Current Activity Tolerance poor   Regular Exercise no   Equipment Currently Used at Home oxygen;walker, rolling   Functional Level Prior   Ambulation 3-->assistive equipment and person   Transferring 3-->assistive equipment and person   Toileting 3-->assistive equipment and person   Bathing 3-->assistive equipment and person   Dressing 3-->assistive equipment and person   Eating 1-->assistive equipment   Communication 0-->understands/communicates without difficulty   Swallowing 0-->swallows foods/liquids without difficulty   Cognition 1 - attention or memory deficits   Fall history within last six months yes   Number of times patient has fallen within last six months 1   Which of the above functional risks had a recent onset or change? ambulation   Prior Functional Level Comment Pt gets assist of 1-2 for all mobility per dtr. Pt sleeps in recliner, walks short distance to bathroom with assist.    General Information   Onset of Illness/Injury or Date of Surgery - Date 02/16/18   Referring Physician Levi   Patient/Family Goals Statement family present, hopes she can return to [facility] memory care   Pertinent History of Current Problem (include personal factors and/or comorbidities that impact the POC) Pt fell at [facility], sustained large laceration L shin, irrigated and repaired in ER. Xray today negative for fracture, spine xray shows old compression fractures. PMH includes dementia, previous compression fractures and vertebroplasty, lymphedema, chronic O2 use.   Precautions/Limitations fall precautions   Weight-Bearing Status - LUE full weight-bearing   Weight-Bearing Status - RUE full  weight-bearing   Weight-Bearing Status - LLE full weight-bearing   Weight-Bearing Status - RLE full weight-bearing   Cognitive Status Examination   Orientation person   Level of Consciousness alert;confused  ([place])   Follows Commands and Answers Questions 75% of the time;able to follow single-step instructions   Personal Safety and Judgment impaired   Memory impaired   Pain Assessment   Patient Currently in Pain Yes, see Vital Sign flowsheet   Integumentary/Edema   Integumentary/Edema Comments LE Lymphedema, sees home therapist for lymph care, having special garment made   Posture    Posture Forward head position;Kyphosis   Posture Comments very kyphotic/forward head.   Range of Motion (ROM)   ROM Comment LE's decreased ROM   Strength   Strength Comments strength grossly 3/5 in LE's   Bed Mobility   Bed Mobility Comments supine to sit with max assist 2   Transfer Skills   Transfer Comments sit to stand with mod assist 2-3   Gait   Gait Comments took 2 steps bed to chair with walker and mod assist 2   Balance   Balance Comments unsteady sitting EOB, leans L   General Therapy Interventions   Planned Therapy Interventions balance training;strengthening;transfer training;gait training   Clinical Impression   Criteria for Skilled Therapeutic Intervention yes, treatment indicated   PT Diagnosis impaired functional mobility   Influenced by the following impairments generalized deconditioning, decreased strength, pain   Functional limitations due to impairments inability to amb household distances, requires assist 2-3 currently so increased caregiver burden   Clinical Presentation Stable/Uncomplicated   Clinical Presentation Rationale per clinical judgement and per medical record   Clinical Decision Making (Complexity) Low complexity   Therapy Frequency` daily   Predicted Duration of Therapy Intervention (days/wks) 4 days   Anticipated Discharge Disposition Transitional Care Facility;Home with Home Therapy;Home with  "Assist  (back to [facility] vs TCU)   Risk & Benefits of therapy have been explained Yes   Patient, Family & other staff in agreement with plan of care Yes   [facility] TM \"6 Clicks\"   2016, Trustees of [facility], under license to [employer].  All rights reserved.   6 Clicks Short Forms Basic Mobility Inpatient Short Form   [facility] AM-PAC  \"6 Clicks\" V.2 Basic Mobility Inpatient Short Form   1. Turning from your back to your side while in a flat bed without using bedrails? 2 - A Lot   2. Moving from lying on your back to sitting on the side of a flat bed without using bedrails? 2 - A Lot   3. Moving to and from a bed to a chair (including a wheelchair)? 2 - A Lot   4. Standing up from a chair using your arms (e.g., wheelchair, or bedside chair)? 2 - A Lot   5. To walk in hospital room? 2 - A Lot   6. Climbing 3-5 steps with a railing? 1 - Total   Basic Mobility Raw Score (Score out of 24.Lower scores equate to lower levels of function) 11   Total Evaluation Time   Total Evaluation Time (Minutes) 15     "

## 2018-02-18 NOTE — PROGRESS NOTES
[facility]  Hospitalist Progress Note  Davian Sims MD    Assessment & Plan   Antoinette Cowan is a 89 year old female with PMHx significant for dementia, chronic atrial fibrillation, breast cancer, severe pulmonary HTN with 2-3+ TR, lower extremity lymphedema, DVT, DLP, HTN, CKD stage 3 (baseline cr 1-1.2), hypothyroidism, and thrombocytosis who presented with a witnessed fall at her [facility] facility suffering a large left shin skin laceration. She was admitted for further management.     Mechanical fall with large left anterior shin skin laceration/wound.  Patient fell after sliding out of a chair at [facility]. Aide was present at time of fall. Large left shin laceration which was irrigated and sutured in the emergency department.  Patient now complains of low back pain.    -Orthopedic surgery consulted for trauma evaluation given depth of wound over left patella. Recommendations pending XRs. If no fx, will recommend WBAT  -Wound care consult  -Empiric antibiotic therapy with Keflex  -Lumbar spine X-ray  -Acetaminophen prn  -Fall precautions  -PT/OT assessment  -Need to remove sutures in 10 days after injury    Acute blood loss anemia, secondary to skin laceration.    Recent Labs  Lab 02/18/18  0610 02/17/18  0945 02/16/18  2112   HGB 7.8* 8.8* 9.3*     -Continue to monitor Hgb every 12 hrs  -Transfusion prn for Hgb<7 (consent obtained from daughter)    NYDIA on CKD stage 3.  Baseline cr 1-1.2. Cr 1.03 upon admission, increased to 1.91 on 02/18. Suspect pre-renal azotemia versus ATN due to low normal BP.  -Check FENa and UA  -Start IV NS at 100 ml/hr  -Continue to hold PTA Bumex  -Continue to monitor renal function and electrolytes  -Avoid NSAIDs and other nephrotoxins     Cervicalgia:   Present prior to trauma.  -Lidocaine patch     HTN,  Bradycardia.  BP has been running soft and HR around 50-60 since admission.  [PTA metoprolol XL 50 mg po bid and Bumex 4 mg po  bid]  -Decreased PTA metoprolol to 25 mg po bid   -Continue to hold PTA Bumex  -Continue to monitor BP    Hypothyroidism.  -Continue PTA levothyroxine     Chronic atrial fibrillation.  Rate is controlled.  -Warfarin, pharmacy to dose  -Continue metoprolol XL at reduced dose of 25 mg b.i.d with hold parameters (dose decreased as above for soft BP and bradycardia)  -Telemetry     History of recurrent DVT.  Patient with a history of DVT with recurrence following discontinuation of Coumadin  -Continue warfarin, pharmacy to dose     Lymphedema.  Patient with chronic lymphedema bilaterally   -Continue to hold PTA Bumex 4 mg bid due to soft blood pressures/ NYDIA    Dementia.  At baseline.    # Pain Assessment:   Current Pain Score 2/18/2018 2/18/2018 2/18/2018   Patient currently in pain? yes yes yes   Pain score (0-10) 8 5 5   Pain location Back Back Back   Pain descriptors - - -   - Antoinette is unable to participate in a collaborative plan for pain management due to dementia.   - Please see the plan for pain management as documented above  - Discussed with daughter      Active Diet Order      Regular Diet Adult      DVT Prophylaxis: Warfarin  Code Status: DNR / DNI  Disposition: Expected discharge in 2+ days pending improvement in renal function.    D/W RN.  D/W daughter at extent 02/18.    Time Spent on this Encounter   I spent 45 minutes on the unit/floor managing the care of Antoinette Cowan. Over 50% of my time was spent counseling the patient and/or coordinating care regarding services listed in this note.    Interval History   Pain complains of low back pain. BP is still running soft. Hgb down to 7.8. Left leg wound is dressed. No new event overnight.    Data reviewed today: I reviewed all new labs and imaging over the last 24 hours.    Physical Exam   Temp: 97.1  F (36.2  C) Temp src: Oral BP: 96/49 Pulse: 56 Heart Rate: 49 Resp: 18 SpO2: 92 % O2 Device: Nasal cannula Oxygen Delivery: 1 LPM  There were no vitals filed  for this visit.  Vital Signs with Ranges  Temp:  [97.1  F (36.2  C)-98.1  F (36.7  C)] 97.1  F (36.2  C)  Pulse:  [56-78] 56  Heart Rate:  [49-64] 49  Resp:  [16-18] 18  BP: ()/(46-53) 96/49  SpO2:  [92 %-100 %] 92 %  I/O's Last 24 hours  I/O last 3 completed shifts:  In: 450 [P.O.:450]  Out: 200 [Urine:200]    Constitutional: Comfortable, no acute distress  HEENT: No conjunctival pallor.  Neurologic: Awake, alert, oriented x1. Hard of hearing. No focal weakness  Neck: Supple, no elevated JVP  Respiratory: Clear to auscultation on ant chest exam  Cardiovascular: Normal S1 and S2. Irregularly irregular rhythm, slow heart rate  GI: Abdomen soft, non tender, non distended  Extremities: No calf tenderness, 3+ B/L LE edema  Skin/integument: Left knee skin wound is dressed.    Medications   All medications were reviewed.    Warfarin Therapy Reminder         acetaminophen  1,000 mg Oral Q8H     levothyroxine (SYNTHROID/LEVOTHROID) tablet 50 mcg  50 mcg Oral Daily     sodium chloride (PF)  3 mL Intracatheter Q8H     lidocaine  2 patch Transdermal Q24H     lidocaine   Transdermal Q24h     lidocaine   Transdermal Q8H     metoprolol succinate  25 mg Oral BID        Data     Recent Labs  Lab 02/18/18  0610 02/17/18  1340 02/17/18  0945 02/16/18  2112   WBC 5.1  --   --  4.4   HGB 7.8*  --  8.8* 9.3*   *  --   --  125*     --   --  262   INR 2.93* 2.83*  --  2.87*     --   --   --    POTASSIUM 3.7  --   --   --    CHLORIDE 98  --   --   --    CO2 31  --   --   --    BUN 49*  --   --   --    CR 1.91*  --   --   --    ANIONGAP 8  --   --   --    KACEY 8.4*  --   --   --    *  --   --   --        No results found for this or any previous visit (from the past 24 hour(s)).

## 2018-02-18 NOTE — PROGRESS NOTES
S:  No acute events overnight. Pain well controlled. Doing well.    O:  BP 96/49 (BP Location: Left arm)  Pulse 56  Temp 97.1  F (36.2  C) (Oral)  Resp 18  SpO2 92%      LLE:  ACE wrap in place  +ttp over laceration  +DF/PF/EHL  +lymphedema  +sensation intact throughout    A/P:  89 year old female with LLE laceration    - Xrays negative for fracture  - recommend WBAT  - PT/OT  - pain control  - dvt prophylaxis  -can f/u with PCP for suture removal in 1-2 weeks

## 2018-02-18 NOTE — PLAN OF CARE
Problem: Patient Care Overview  Goal: Plan of Care/Patient Progress Review  Diagnosis: L leg laceration, 30 stitches placed in ED  POD#:N/A  Mental Status: confused, alert to self, [place]  Activity: lift 2A  Diet: Regular  Pain: Low back pain with movement. Scheduled tylenol.   Lift   Elliott/Voiding: Ceiling lift/bed pan  Tele/Restraints/Iso: none  02/LDA: No IV access, hematoma on previous IV site  D/C Date: Pending  Other Info: Pt alert to self, forgetful, hard of hearing. Pt's  at bedside yesterday, daughters in evening. A letter for pt's daughter regarding air travel from doctor is on window ledge. Pt admitted from observation status to inpatient. Hgb decreased from 9.3 to 8.8, to be redrawn in AM, daughter will make decision about transfusion at that time if needed. To be seen by ortho. On 1 L O2 at baseline. Developed hematoma at SL IV site, decreased with pressure and cold. On coumadin at assisted living. Has significant edema bilaterally, hx of lymphadema. Continue to monitor.

## 2018-02-18 NOTE — PLAN OF CARE
Problem: Patient Care Overview  Goal: Plan of Care/Patient Progress Review  PT - Eval completed and treatment initiated. Pt is an 89 y/o female admitted after fall at [facility] where she sustained large laceration over L tibia. X-ray negative for fracture, pt also has long-standing back pain and h/o vertebral fractures. She lives in [facility] where she has assist of 1-2 for all mobility, sleeps in recliner chair and walks only to/from the bathroom (and only with assist). She has several daughters involved with her care. Currently, pt is weak and painful, requires max assist 2 to transfer to edge of bed, took 3 attempts to get into standing - once upright could take several small steps to the chair with min assist 2.    Discharge Planner PT   Patient plan for discharge: daughter reports they hope she can go back to [facility] care  Current status: See above. Up to chair with heavy assist 2.  Barriers to return to prior living situation: Pt normally transfers with assist 1-2, they can provide that level of care at her [facility] per dtr  Recommendations for discharge: back to [facility] with home PT  Rationale for recommendations: Pt does better in her own familiar apt, daughter is here from out of town and staying for a week so can help, [facility] can increase amount of help to 2 assist. Pt does need to be able to take a few steps, will work on that in PT.       Entered by: Chelsi Jacobo 02/18/2018 3:50 PM

## 2018-02-19 ENCOUNTER — APPOINTMENT (OUTPATIENT)
Dept: PHYSICAL THERAPY | Facility: CLINIC | Age: 83
DRG: 605 | End: 2018-02-19
Payer: COMMERCIAL

## 2018-02-19 LAB
ALBUMIN UR-MCNC: NEGATIVE MG/DL
ANION GAP SERPL CALCULATED.3IONS-SCNC: 7 MMOL/L (ref 3–14)
APPEARANCE UR: CLEAR
BACTERIA #/AREA URNS HPF: ABNORMAL /HPF
BILIRUB UR QL STRIP: NEGATIVE
BUN SERPL-MCNC: 44 MG/DL (ref 7–30)
CALCIUM SERPL-MCNC: 8.3 MG/DL (ref 8.5–10.1)
CHLORIDE SERPL-SCNC: 100 MMOL/L (ref 94–109)
CO2 SERPL-SCNC: 30 MMOL/L (ref 20–32)
COLOR UR AUTO: YELLOW
CREAT SERPL-MCNC: 1.32 MG/DL (ref 0.52–1.04)
CREAT SERPL-MCNC: 1.5 MG/DL (ref 0.52–1.04)
CREAT UR-MCNC: 57 MG/DL
FRACT EXCRET NA UR+SERPL-RTO: 0.3 %
GFR SERPL CREATININE-BSD FRML MDRD: 33 ML/MIN/1.7M2
GFR SERPL CREATININE-BSD FRML MDRD: 38 ML/MIN/1.7M2
GLUCOSE SERPL-MCNC: 126 MG/DL (ref 70–99)
GLUCOSE UR STRIP-MCNC: NEGATIVE MG/DL
HGB BLD-MCNC: 8.4 G/DL (ref 11.7–15.7)
HGB BLD-MCNC: 8.4 G/DL (ref 11.7–15.7)
HGB UR QL STRIP: NEGATIVE
INR PPP: 2.88 (ref 0.86–1.14)
KETONES UR STRIP-MCNC: NEGATIVE MG/DL
LEUKOCYTE ESTERASE UR QL STRIP: NEGATIVE
MUCOUS THREADS #/AREA URNS LPF: PRESENT /LPF
NITRATE UR QL: NEGATIVE
PH UR STRIP: 5.5 PH (ref 5–7)
POTASSIUM SERPL-SCNC: 3.4 MMOL/L (ref 3.4–5.3)
RBC #/AREA URNS AUTO: 0 /HPF (ref 0–2)
SODIUM SERPL-SCNC: 137 MMOL/L (ref 133–144)
SODIUM SERPL-SCNC: 138 MMOL/L (ref 133–144)
SODIUM UR-SCNC: 17 MMOL/L
SOURCE: ABNORMAL
SP GR UR STRIP: 1.01 (ref 1–1.03)
SQUAMOUS #/AREA URNS AUTO: 1 /HPF (ref 0–1)
UROBILINOGEN UR STRIP-MCNC: NORMAL MG/DL (ref 0–2)
WBC #/AREA URNS AUTO: 3 /HPF (ref 0–2)

## 2018-02-19 PROCEDURE — 82570 ASSAY OF URINE CREATININE: CPT | Performed by: INTERNAL MEDICINE

## 2018-02-19 PROCEDURE — 12000007 ZZH R&B INTERMEDIATE

## 2018-02-19 PROCEDURE — 25000132 ZZH RX MED GY IP 250 OP 250 PS 637

## 2018-02-19 PROCEDURE — 85610 PROTHROMBIN TIME: CPT | Performed by: INTERNAL MEDICINE

## 2018-02-19 PROCEDURE — 36415 COLL VENOUS BLD VENIPUNCTURE: CPT | Performed by: INTERNAL MEDICINE

## 2018-02-19 PROCEDURE — 81001 URINALYSIS AUTO W/SCOPE: CPT | Performed by: INTERNAL MEDICINE

## 2018-02-19 PROCEDURE — 97530 THERAPEUTIC ACTIVITIES: CPT | Mod: GP

## 2018-02-19 PROCEDURE — 84300 ASSAY OF URINE SODIUM: CPT | Performed by: INTERNAL MEDICINE

## 2018-02-19 PROCEDURE — 25000132 ZZH RX MED GY IP 250 OP 250 PS 637: Performed by: INTERNAL MEDICINE

## 2018-02-19 PROCEDURE — 82565 ASSAY OF CREATININE: CPT | Performed by: INTERNAL MEDICINE

## 2018-02-19 PROCEDURE — 40000193 ZZH STATISTIC PT WARD VISIT

## 2018-02-19 PROCEDURE — 99232 SBSQ HOSP IP/OBS MODERATE 35: CPT | Performed by: INTERNAL MEDICINE

## 2018-02-19 PROCEDURE — 84295 ASSAY OF SERUM SODIUM: CPT | Performed by: INTERNAL MEDICINE

## 2018-02-19 PROCEDURE — 85018 HEMOGLOBIN: CPT | Performed by: INTERNAL MEDICINE

## 2018-02-19 PROCEDURE — 80048 BASIC METABOLIC PNL TOTAL CA: CPT | Performed by: INTERNAL MEDICINE

## 2018-02-19 PROCEDURE — 25000128 H RX IP 250 OP 636: Performed by: INTERNAL MEDICINE

## 2018-02-19 RX ORDER — WARFARIN SODIUM 1 MG/1
1 TABLET ORAL
Status: COMPLETED | OUTPATIENT
Start: 2018-02-19 | End: 2018-02-19

## 2018-02-19 RX ADMIN — WARFARIN SODIUM 1 MG: 1 TABLET ORAL at 18:40

## 2018-02-19 RX ADMIN — SODIUM CHLORIDE: 9 INJECTION, SOLUTION INTRAVENOUS at 06:42

## 2018-02-19 RX ADMIN — CEPHALEXIN 500 MG: 250 POWDER, FOR SUSPENSION ORAL at 16:26

## 2018-02-19 RX ADMIN — CEPHALEXIN 500 MG: 250 POWDER, FOR SUSPENSION ORAL at 21:11

## 2018-02-19 RX ADMIN — LEVOTHYROXINE SODIUM 50 MCG: 50 TABLET ORAL at 10:06

## 2018-02-19 RX ADMIN — CEPHALEXIN 500 MG: 250 POWDER, FOR SUSPENSION ORAL at 10:14

## 2018-02-19 RX ADMIN — LIDOCAINE 2 PATCH: 560 PATCH PERCUTANEOUS; TOPICAL; TRANSDERMAL at 13:50

## 2018-02-19 RX ADMIN — METOPROLOL SUCCINATE 25 MG: 25 TABLET, EXTENDED RELEASE ORAL at 10:06

## 2018-02-19 RX ADMIN — ACETAMINOPHEN 1000 MG: 500 TABLET, FILM COATED ORAL at 06:01

## 2018-02-19 RX ADMIN — ACETAMINOPHEN 1000 MG: 500 TABLET, FILM COATED ORAL at 21:06

## 2018-02-19 RX ADMIN — Medication 1 MG: at 21:06

## 2018-02-19 RX ADMIN — SENNOSIDES AND DOCUSATE SODIUM 2 TABLET: 8.6; 5 TABLET ORAL at 10:06

## 2018-02-19 RX ADMIN — ACETAMINOPHEN 1000 MG: 500 TABLET, FILM COATED ORAL at 13:55

## 2018-02-19 RX ADMIN — SODIUM CHLORIDE: 9 INJECTION, SOLUTION INTRAVENOUS at 18:40

## 2018-02-19 NOTE — PLAN OF CARE
Problem: Patient Care Overview  Goal: Plan of Care/Patient Progress Review  Discharge Planner PT   Patient plan for discharge: Daughter reports they hope she can go back to retirement memory care with HHPT  Current status: Pt on 1L O2, sitting up in chair. Pt completes one sit>stand with modAx2 to FWW, significant posterior lean, tolerates standing for 1 minute prior to sitting. Second attempt at standing required modAx1 and minAx1 to FWW, improved standing posture, tolerates standing for 4 minutes.   Barriers to return to prior living situation: Pt normally transfers with assist 1-2, they can provide that level of care at her retirement per daughter, decreased activity tolerance, unable to ambulate at this time, weakness  Recommendations for discharge: back to retirement with home PT  Rationale for recommendations: Pt does better in her own familiar apt, daughter is here from out of town and staying for a week so can help, retirement can increase amount of help to 2 assist. Pt does need to be able to take a few steps, will work on that in PT. Pt would benefit from HHPT when returns to [facility] to progress strength, activity tolerance, and balance to return to PLOF.       Entered by: Janette Keller 02/19/2018 2:00 PM

## 2018-02-19 NOTE — PROGRESS NOTES
Orthopedic Surgery  Antoinette Cowan  2018  Admit Date:  2018  LLE laceration with repair in ED    Patient resting comfortably in chair.    Pain controlled.    Tolerating oral intake.   Denies nausea or vomiting  Denies chest pain or SOB   No events overnight.     Alert and orient to person, place, and time.  Vital Sign Ranges  Temperature Temp  Av.3  F (36.3  C)  Min: 97.2  F (36.2  C)  Max: 97.3  F (36.3  C)   Blood pressure Systolic (24hrs), Av , Min:96 , Max:140        Diastolic (24hrs), Av, Min:41, Max:97      Pulse Pulse  Av.5  Min: 63  Max: 102   Respirations Resp  Av.2  Min: 16  Max: 18   Pulse oximetry SpO2  Av.7 %  Min: 92 %  Max: 94 %       ACE wrap in place  Sutures intact, small amount of bleeding from scab with dressing removed  +ttp over laceration  +DF/PF/EHL  +lymphedema bilaterally  +sensation intact throughout    Labs:  Recent Labs   Lab Test  18   0618  18   0610  17   0620   POTASSIUM  3.4  3.7  3.9     Recent Labs   Lab Test  18   0618  18   1915  18   0610   HGB  8.4*  8.7*  7.8*     Recent Labs   Lab Test  18   0618  18   0610  18   1340   INR  2.88*  2.93*  2.83*     Recent Labs   Lab Test  18   0610  18   2112  17   0620   PLT  221  262  156       A/P  1. Plan   Mobilize with PT/OT    WBAT    Continue current pain regiment.   Follow-up with PCP for suture removal 2 weeks post repair    2. Disposition   Anticipate d/c to assisted living facility when cleared per medicine, ok per ortho.    Kalani Brandt PA-C

## 2018-02-19 NOTE — PROGRESS NOTES
D:  Left shin wound sutured by ortho at bedside earlier today.  Wound currently wrapped around gaiter area with a ACE wrap.  Pt in the middle of PT w lift, gait belt and 2 therapists.  I/P:  Discussed plan with daughter and nurse.  Will defer my assessment until tomorrow as wound was just addressed by ortho and am unable to see due to in PT.  Daughter wanted me to take a look so will return tomorrow to reassess and reassure

## 2018-02-19 NOTE — PLAN OF CARE
Problem: Fall Risk (Adult)  Goal: Identify Related Risk Factors and Signs and Symptoms  Related risk factors and signs and symptoms are identified upon initiation of [employer] Clinical Practice Guideline (CPG).   Outcome: No Change  Patient up with mechanical lift/strong assist of 2.  Denies pain; given scheduled Tylenol.  Incontinent.  Good PO intake.  VSS on 2L NC.  Alert to self only; pleasant and cooperative.  NS@100.  LLE ACE CDI.  Tele A-fib with CVR.

## 2018-02-19 NOTE — PROGRESS NOTES
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 She was admitted for further management.     Mechanical fall with large left anterior shin skin laceration/wound.  Patient fell after sliding out of a chair at [facility]. Aide was present at time of fall. Large left shin laceration which was irrigated and sutured in the emergency department. X-ray tibia/fibula as well as lumbar spine negative for acute fratures.    -Orthopedic surgery consulted and recommended WBAT  -Wound care consult  -Empiric antibiotic therapy with Keflex  -Acetaminophen prn  -Fall precautions  -Continue PT/OT assessment  -Need to remove sutures in 10 days after injury    Acute blood loss anemia, secondary to skin laceration.    Recent Labs  Lab 02/19/18  0618 02/18/18  1915 02/18/18  0610 02/17/18  0945 02/16/18  2112   HGB 8.4* 8.7* 7.8* 8.8* 9.3*     -Continue to monitor Hgb every 12 hrs  -Transfusion prn for Hgb<7 (consent obtained from daughter)    NYDIA on CKD stage 3.  Baseline cr 1-1.2. Cr 1.03 upon admission, increased to 1.91 on 02/18. Suspect pre-renal azotemia versus ATN due to low normal BP.  Improving with IVF.    Recent Labs  Lab 02/19/18  0618 02/18/18  0610   CR 1.50* 1.91*     -Follow up on FENa and UA  -Continue IV NS at 75 ml/hr  -Continue to hold PTA Bumex  -Continue to monitor renal function and electrolytes  -Continue to monitor I/O  -Avoid NSAIDs and other nephrotoxins     Cervicalgia:   Present prior to trauma.  -Lidocaine patch     HTN,  Bradycardia.  BP was running soft and HR was around  50-60 on hospital day 1.  [PTA metoprolol XL 50 mg po bid and Bumex 4 mg po bid]  -Decreased PTA metoprolol to 25 mg po bid   -Continue to hold PTA Bumex  -Continue to monitor BP    Hypothyroidism.  -Continue PTA levothyroxine     Chronic atrial fibrillation.  Rate is controlled.  -Warfarin, pharmacy to dose  -Continue metoprolol XL at reduced dose of 25 mg b.i.d with hold parameters (dose decreased as above for soft BP and bradycardia)  -Telemetry     History of recurrent DVT.  Patient with a history of DVT with recurrence following discontinuation of Coumadin  -Continue warfarin, pharmacy to dose     Lymphedema.  Patient with chronic lymphedema bilaterally   -Continue to hold PTA Bumex 4 mg bid due to soft blood pressures/ NYDIA    Dementia.  At baseline.    # Pain Assessment:   Current Pain Score 2/19/2018 2/18/2018 2/18/2018   Patient currently in pain? denies yes yes   Pain score (0-10) - 8 5   Pain location - Back Back   Pain descriptors - - -   - Antoinette is unable to participate in a collaborative plan for pain management due to dementia.   - Please see the plan for pain management as documented above  - Discussed with daughter      Active Diet Order      Regular Diet Adult      DVT Prophylaxis: Warfarin  Code Status: DNR / DNI  Disposition: Expected discharge in 1-2 days pending improvement in renal function.    D/W RN and ortho.  D/W daughter at extent 02/19.    Interval History   Pain continues to complain of low back pain. BP is more stable. Left leg wound is dressed. No new event overnight.    Data reviewed today: I reviewed all new labs and imaging over the last 24 hours.    Physical Exam   Temp: (P) 97.3  F (36.3  C) Temp src: (P) Oral BP: (P) 103/53 Pulse: (P) 63 Heart Rate: 64 Resp: (P) 18 SpO2: (P) 92 % O2 Device: (P) None (Room air) Oxygen Delivery: 2 LPM  There were no vitals filed for this visit.  Vital Signs with Ranges  Temp:  [97.3  F (36.3  C)] (P) 97.3  F (36.3  C)  Pulse:  [63-66] (P) 63  Heart  Rate:  [49-64] 64  Resp:  [16-18] (P) 18  BP: ()/(41-54) (P) 103/53  SpO2:  [92 %-94 %] (P) 92 %  I/O's Last 24 hours  I/O last 3 completed shifts:  In: 1941 [P.O.:258; I.V.:1683]  Out: 400 [Urine:400]    Constitutional: Comfortable, no acute distress  HEENT: No conjunctival pallor.  Neurologic: Awake, alert, oriented x1. Hard of hearing. No focal weakness  Neck: Supple, no elevated JVP  Respiratory: Clear to auscultation on ant chest exam  Cardiovascular: Normal S1 and S2. Irregularly irregular rhythm, slow heart rate  GI: Abdomen soft, non tender, non distended  Extremities: No calf tenderness, 3+ B/L LE edema  Skin/integument: Left knee skin wound is dressed.    Medications   All medications were reviewed.    sodium chloride 100 mL/hr at 02/19/18 0642     Warfarin Therapy Reminder         cephaleXin  500 mg Oral TID     acetaminophen  1,000 mg Oral Q8H     levothyroxine (SYNTHROID/LEVOTHROID) tablet 50 mcg  50 mcg Oral Daily     sodium chloride (PF)  3 mL Intracatheter Q8H     lidocaine  2 patch Transdermal Q24H     lidocaine   Transdermal Q24h     lidocaine   Transdermal Q8H     metoprolol succinate  25 mg Oral BID        Data     Recent Labs  Lab 02/19/18  0618 02/18/18  1915 02/18/18  0610 02/17/18  1340  02/16/18  2112   WBC  --   --  5.1  --   --  4.4   HGB 8.4* 8.7* 7.8*  --   < > 9.3*   MCV  --   --  123*  --   --  125*   PLT  --   --  221  --   --  262   INR 2.88*  --  2.93* 2.83*  --  2.87*     --  137  --   --   --    POTASSIUM 3.4  --  3.7  --   --   --    CHLORIDE 100  --  98  --   --   --    CO2 30  --  31  --   --   --    BUN 44*  --  49*  --   --   --    CR 1.50*  --  1.91*  --   --   --    ANIONGAP 7  --  8  --   --   --    KACEY 8.3*  --  8.4*  --   --   --    *  --  127*  --   --   --    < > = values in this interval not displayed.    Recent Results (from the past 24 hour(s))   XR Tibia & Fibula Left 2 Views    Narrative    LEFT TIBIA-FIBULA TWO VIEWS 2/18/2018 12:52 PM      HISTORY: Left leg laceration, evaluate for fracture.    COMPARISON: None.      Impression    IMPRESSION: Osteopenia. Total knee arthroplasty. Arterial  calcifications. No evidence of fracture.    NYLA VASQUEZ MD   XR Lumbar Spine 2/3 Views    Narrative    LUMBAR SPINE TWO VIEWS  2/18/2018 12:53 PM     HISTORY: Back pain following fall.    COMPARISON: None.      Impression    IMPRESSION:  Previous vertebroplasties at T12 and L1. Scoliosis convex  to the left. No definite new fracture. Arterial calcifications. Vena  cava filter. Constipation.    NYLA VASQUEZ MD

## 2018-02-19 NOTE — PLAN OF CARE
Problem: Patient Care Overview  Goal: Plan of Care/Patient Progress Review  Outcome: Improving  Pt A&O. VSS. Up w/ Lift and A2 to turn and reposition. Incontinence of BM and bladder. Numbness on LLE, baseline per pt. Pain controled with schedule tylenol. Continue to monitor.

## 2018-02-20 ENCOUNTER — APPOINTMENT (OUTPATIENT)
Dept: PHYSICAL THERAPY | Facility: CLINIC | Age: 83
DRG: 605 | End: 2018-02-20
Attending: PHYSICIAN ASSISTANT
Payer: COMMERCIAL

## 2018-02-20 LAB
ANION GAP SERPL CALCULATED.3IONS-SCNC: 7 MMOL/L (ref 3–14)
BUN SERPL-MCNC: 38 MG/DL (ref 7–30)
CALCIUM SERPL-MCNC: 8.3 MG/DL (ref 8.5–10.1)
CHLORIDE SERPL-SCNC: 102 MMOL/L (ref 94–109)
CO2 SERPL-SCNC: 30 MMOL/L (ref 20–32)
CREAT SERPL-MCNC: 1.25 MG/DL (ref 0.52–1.04)
GFR SERPL CREATININE-BSD FRML MDRD: 40 ML/MIN/1.7M2
GLUCOSE SERPL-MCNC: 103 MG/DL (ref 70–99)
HGB BLD-MCNC: 8.2 G/DL (ref 11.7–15.7)
INR PPP: 2.89 (ref 0.86–1.14)
POTASSIUM SERPL-SCNC: 3.5 MMOL/L (ref 3.4–5.3)
SODIUM SERPL-SCNC: 139 MMOL/L (ref 133–144)

## 2018-02-20 PROCEDURE — 40000894 ZZH STATISTIC OT IP EVAL DEFER

## 2018-02-20 PROCEDURE — 40000193 ZZH STATISTIC PT WARD VISIT

## 2018-02-20 PROCEDURE — 80048 BASIC METABOLIC PNL TOTAL CA: CPT | Performed by: INTERNAL MEDICINE

## 2018-02-20 PROCEDURE — 97530 THERAPEUTIC ACTIVITIES: CPT | Mod: GP

## 2018-02-20 PROCEDURE — 85610 PROTHROMBIN TIME: CPT | Performed by: INTERNAL MEDICINE

## 2018-02-20 PROCEDURE — 99232 SBSQ HOSP IP/OBS MODERATE 35: CPT | Performed by: INTERNAL MEDICINE

## 2018-02-20 PROCEDURE — 25000132 ZZH RX MED GY IP 250 OP 250 PS 637: Performed by: INTERNAL MEDICINE

## 2018-02-20 PROCEDURE — 36415 COLL VENOUS BLD VENIPUNCTURE: CPT | Performed by: INTERNAL MEDICINE

## 2018-02-20 PROCEDURE — 97140 MANUAL THERAPY 1/> REGIONS: CPT | Mod: GP

## 2018-02-20 PROCEDURE — 85018 HEMOGLOBIN: CPT | Performed by: INTERNAL MEDICINE

## 2018-02-20 PROCEDURE — 97110 THERAPEUTIC EXERCISES: CPT | Mod: GP

## 2018-02-20 PROCEDURE — 12000007 ZZH R&B INTERMEDIATE

## 2018-02-20 PROCEDURE — G0463 HOSPITAL OUTPT CLINIC VISIT: HCPCS

## 2018-02-20 RX ORDER — WARFARIN SODIUM 1 MG/1
1 TABLET ORAL
Status: COMPLETED | OUTPATIENT
Start: 2018-02-20 | End: 2018-02-20

## 2018-02-20 RX ADMIN — WARFARIN SODIUM 1 MG: 1 TABLET ORAL at 19:48

## 2018-02-20 RX ADMIN — Medication 1 MG: at 19:48

## 2018-02-20 RX ADMIN — LEVOTHYROXINE SODIUM 50 MCG: 50 TABLET ORAL at 10:05

## 2018-02-20 RX ADMIN — METOPROLOL SUCCINATE 25 MG: 25 TABLET, EXTENDED RELEASE ORAL at 10:05

## 2018-02-20 RX ADMIN — CEPHALEXIN 500 MG: 250 POWDER, FOR SUSPENSION ORAL at 21:15

## 2018-02-20 RX ADMIN — ACETAMINOPHEN 1000 MG: 500 TABLET, FILM COATED ORAL at 21:15

## 2018-02-20 RX ADMIN — LIDOCAINE 2 PATCH: 560 PATCH PERCUTANEOUS; TOPICAL; TRANSDERMAL at 10:06

## 2018-02-20 RX ADMIN — METOPROLOL SUCCINATE 25 MG: 25 TABLET, EXTENDED RELEASE ORAL at 21:15

## 2018-02-20 RX ADMIN — ACETAMINOPHEN 1000 MG: 500 TABLET, FILM COATED ORAL at 14:13

## 2018-02-20 RX ADMIN — CEPHALEXIN 500 MG: 250 POWDER, FOR SUSPENSION ORAL at 16:05

## 2018-02-20 RX ADMIN — CEPHALEXIN 500 MG: 250 POWDER, FOR SUSPENSION ORAL at 10:08

## 2018-02-20 RX ADMIN — ACETAMINOPHEN 1000 MG: 500 TABLET, FILM COATED ORAL at 05:35

## 2018-02-20 NOTE — PLAN OF CARE
Problem: Patient Care Overview  Goal: Plan of Care/Patient Progress Review  Discharge Planner PT   Patient plan for discharge: return to [facility] with HHPT and resumption of HHOT for continued skilled lymph  Current status: Pt seated in chair on PT entry, agreeable to lymph assessment and compression wrapping, skin cleaned and wrapping tolerated. Handed off lymph cares to nursing staff to complete during rest of acute stay, discussed with RN and entered pt care order. Pt participates in repeated sit<>stand transfers, requires ModAx2 first initial 2 attempts, completes to full upright with neutral posturing/lean on 3rd try. Pt ambulates x6' with FWW and ModAx2 progressing to ModAx1+CGA/MinAx1. Good LE ex tolerance.  Barriers to return to prior living situation: decreased activity tolerance, decreased ambulation tolerance, weakness; daughter reports Ax2 available at [facility]  Recommendations for discharge: back to [facility] with HHPT and resumed HHOT for skilled OT lymph cares  Rationale for recommendations: Pt does better in her own familiar apt, daughter is here from out of town and staying for a week so can help, [facility] can increase amount of help to 2 assist. Pt does need to be able to take a few steps, will work on that in PT. Pt would benefit from HHPT when returns to [facility] to progress strength, activity tolerance, and balance to return to PLOF.       Entered by: Ruth Nolasco 02/20/2018 3:43 PM

## 2018-02-20 NOTE — PROGRESS NOTES
Orthopedic Surgery  Antoinette Cowan  2018  Admit Date:  2018  LLE laceration with repair in ED     Patient resting comfortably in chair.    Pain controlled.    Tolerating oral intake.   Denies nausea or vomiting  Denies chest pain or SOB   No events overnight.      Alert and orient to person, place, and time.  Vital Sign Ranges  Temperature Temp  Av.3  F (36.3  C)  Min: 97.2  F (36.2  C)  Max: 97.6  F (36.4  C)   Blood pressure Systolic (24hrs), Av , Min:98 , Max:114        Diastolic (24hrs), Av, Min:41, Max:58      Pulse Pulse  Av  Min: 51  Max: 63   Respirations Resp  Av  Min: 16  Max: 16   Pulse oximetry SpO2  Av %  Min: 89 %  Max: 98 %       ACE wrap in place, remained in place as wound care will be evaluating today  +ttp over laceration  +DF/PF/EHL  +lymphedema bilaterally  +sensation intact throughout    Labs:  Recent Labs   Lab Test  18   0550  18   0618  18   0610   POTASSIUM  3.5  3.4  3.7     Recent Labs   Lab Test  18   0550  18   1838  18   0618   HGB  8.2*  8.4*  8.4*     Recent Labs   Lab Test  18   0550  18   0618  18   0610   INR  2.89*  2.88*  2.93*     Recent Labs   Lab Test  18   0610  18   2112  17   0620   PLT  221  262  156       A/P  1. Plan                        Mobilize with PT/OT                         WBAT                         Continue current pain regiment.                        Follow-up with PCP for suture removal 2 weeks post repair     2. Disposition                        Anticipate d/c to assisted living facility when cleared per medicine, ok per ortho.    Kalani Brandt PA-C

## 2018-02-20 NOTE — PROGRESS NOTES
02/20/18 1400   General Information   Discipline PT   Onset of Edema 09/01/13   Affected Body Part(s) Left LE;Right LE   Edema Etiology Unknown   Pertinent history of current problem (PT: include personal factors and/or comorbidities that impact the POC; OT: include additional occupational profile info) Chronic problem per pt's daughter, has had compression wrapping in the past, not recently. LE Lymphedema, sees home therapist for lymph care, having special garment made (velcro, to be available in approx 1 week)   Edema Precaution Comments pt with laceration, per discussion with WOC RN pt appropriate for compression wrapping over top once wound cares completed with lesser compression at that site for tolerance   Pain   Patient currently in pain No   Pain comments pt denies   Edema Examination / Assessment   Skin Condition Pitting;Dryness;Other (see comments)   Skin Condition Comments R LE intact, L LE with wound, see ortho and WOC notes for wound /laceration condition   Scar Yes   Location TKA scar to L LE, reports >10 years old   Mobility no concerns   Scar Comments well healed   Ulcerations No   Stemmer Sign Positive   ROM   Range of Motion (WFL) other (describe)   Description of Range of Motion Deficits see PT eval   Strength   Strength (WFL) other (describe)   Description of Strength Deficits see PT eval   Sensation   Sensation (WFL) no deficits were identified  (pt denies numbness/tingling to B LEs)   Assessment/Plan   Patient presents with Stage 2 Lymphedema   Assessment pt with 2+pitting edema to R LE, 3+ on R dorsum of foot, 3+ pitting to L LE throughout, 2+ pitting edema to B thighs, no noted increased firmness or edema noted to pt's abdomen. Pt's skin is dry, R LE is intact, L LE has laceration and is covered at this time. Hemosiderin staining noted > to R LE. L TKA scar well healed present >10 years old (mature)   Clinical Presentation Stable/Uncomplicated   Clinical Presentation Rationale chronic  baseline edema, wraps recently initiated at baseline, prior to admission HHOT invovled and measured pt for compression garment (velcro)   Clinical Decision Making (Complexity) Low complexity   Planned Edema Interventions Gradient compression bandaging;Exercises;Education   Treatment Frequency daily   Treatment Duration 1 day  (handed off to nursing to start cares 2/21/18)   Patient, Family and/or Staff in agreement with plan of care. Yes   Risks and benefits of treatment have been explained. Yes   Total Evaluation Time   Total Evaluation Time (Minutes) 5

## 2018-02-20 NOTE — PLAN OF CARE
Problem: Patient Care Overview  Goal: Plan of Care/Patient Progress Review  Discharge Planner OT   Patient plan for discharge: Return to [facility] with increased services    Current status: Order received, chart reviewed. Per chart review and discussion with PT, pt receives A from staff at [facility] for all ADLs. No skilled OT needs identified as [facility] can continue to provide necessary A.     Barriers to return to prior living situation: Current level of A required; Fall risk with history; Baseline cognitive deficit with associated safety    Recommendations for discharge: Back to [facility] with A from staff and trial of home OT    Rationale for recommendations: Pt limited by pain, impaired cognition and safety, and decreased balance and activity tolerance. Pt would benefit from trial of OT intervention in home environment to maximize I and safety with ADL participation. Defer transfer training to PT during hospitalization. OT to sign off at this time.         Entered by: Eugene Molina 02/20/2018 9:20 AM

## 2018-02-20 NOTE — PROGRESS NOTES
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 She was admitted for further management.     Jaydon Assessment and sub scores:   Jaydon Score  Avg: 15.6  Min: 15  Max: 16    Positioning: Pillows,     Mattress:  Standard , Atmos Air mattress    Moisture Management:  dressings    Current Diet / Nutrition:           Active Diet Order      Regular Diet Adult    Labs:   Recent Labs   Lab Test  02/20/18   0550   02/18/18   0610   09/10/17   1450   ALBUMIN   --    --    --    --   3.5   HGB  8.2*   < >  7.8*   < >  13.3   INR  2.89*   < >  2.93*   < >  2.49*   WBC   --    --   5.1   < >  3.7*    < > = values in this interval not displayed.       Wound Assessment (location):   Left shin, anterior to lateral  Wound History:  Pt fell at home (memory care facility) and injured her left LE  Large wound with most of wound covered by approximated skin flap(s) that are sutured into place.  There  Is a half moon shaped line of sutures along bottom of flap and another suture line traveling vertically from base suture line.  All is approximated other than a 3cm x 1.4cm area of exposed dried, red to blackish tissue.  Entire wound is large, about 12cm x 8cm.  Painful to light touch.  Moderate serosanguinous drainage.  Tissue, including flaps and periwound tissue is mostly purplish bruised looking.              Intervention:     Patient's chart evaluated.      Wound(s) assessed with the daughter as noted above    Orders  Written  Supplies   Reviewed, gathered and placed at bedside, discussed with RN, discussed with RN ordering MicroKlenz Spray and Aquacel Ag    Discussed plan of care with Patient, Family and Nurse          Assessment:       Large traumatic wound on the left anterior to lateral shin, clean without obvious s/s of infection but with moderate drainage.  Will use Aquacel Ag to protect from infection and help manage drainage.  Pt with significant lymphedema in bilateral LE, has been getting compression to bilateral LE prior to admission, will continue with compression to promote healing.         Plan:     Nursing to notify the Provider(s) and re-consult the [facility] Nurse if wound(s) deteriorate(s) or if the wound care plan needs reevaluation.    Plan of care for wound located on left LE: daily    Carefully remove old dressings.  If they stick then wet prior to removal- don't tear them off    1. Clean both legs and feet with gentle soap and water, getting between the toes    2. Apply lotion from toes to knees, not between toes.    3. Clean wound on left anterior/ lateral shin with MicroKlenz Spray, pat dry    4.  Paint the entire area, including and especially the wound and suture line, with No Sting Skin Prep- this will help protect the skin from moisture and drainage and help keep sutures together until wound heals more.    5. Cover wounds/ suture lines with Aquacel Ag- cut wide strips as needed.    6. Lightly mist the Aquacel Ag with MicroKlenz Spray in areas where there is no drainage.  If drainage then leave Aquacel dry to better absorb drainage.    7. Cover with ABD    8. Secure with Ana wrap, time and date    9.  Compression as ordered- making sure to time and date the tape when wraps applied    10.  Keep heels elevated at all times    - consider stopping Aquacel Ag dressings when no more drainage, can just cover sutures with an ABD so they don't snag on the wraps.     [facility] Nurse will return: weekly and prn

## 2018-02-20 NOTE — PLAN OF CARE
Problem: Patient Care Overview  Goal: Plan of Care/Patient Progress Review  Pt alert, oriented to self and place at times. Baseline dementia. VSS on 1L O2 overnight. Tele: A-Fib W CVR. Up with A2 and lift to BSC. Tylenol given for pain, pt reports back pain. Baseline neuropathy and lymphedema BLE. LLE dressing CDI. Incontinent of B&B at times. IVF infusing.

## 2018-02-20 NOTE — PROGRESS NOTES
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 She was admitted for further management.     Mechanical fall with large left anterior shin skin laceration/wound.  Patient fell after sliding out of a chair at [facility]. Aide was present at time of fall. Large left shin laceration which was irrigated and sutured in the emergency department. X-ray tibia/fibula as well as lumbar spine negative for acute fratures.    -Orthopedic surgery consulted and recommended WBAT  -Wound care input pending  -Empiric antibiotic therapy with Keflex  -Acetaminophen prn  -Fall precautions  -Continue PT/OT assessment  -Need to remove sutures 2 weeks post repair    Acute blood loss anemia, secondary to skin laceration.    Recent Labs  Lab 02/20/18  0550 02/19/18  1838 02/19/18  0618 02/18/18  1915 02/18/18  0610 02/17/18  0945 02/16/18  2112   HGB 8.2* 8.4* 8.4* 8.7* 7.8* 8.8* 9.3*     -Continue to monitor Hgb   -Transfusion prn for Hgb<7 (consent obtained from daughter)    NYDIA on CKD stage 3.  Baseline cr 1-1.2. Cr 1.03 upon admission, increased to 1.91 on 02/18. FeNa 0.3%. U/A unremarkable. Suspect pre-renal azotemia given low FENa.  Improved with IVF.    Recent Labs  Lab 02/20/18  0550 02/19/18  2240 02/19/18  0618 02/18/18  0610   CR 1.25* 1.32* 1.50* 1.91*       -D/C IVF 02/20  -Continue to hold PTA Bumex  -Continue to monitor renal function and electrolytes  -Continue to monitor I/O  -Avoid NSAIDs and other nephrotoxins     Cervicalgia:   Present prior to trauma.  -Lidocaine patch      HTN,  Bradycardia.  BP is running soft and HR was around 50-60 on hospital day 1.  [PTA metoprolol XL 50 mg po bid and Bumex 4 mg po bid]  -Decreased PTA metoprolol to 25 mg po bid   -Continue to hold PTA Bumex  -Continue to monitor BP    Hypothyroidism.  -Continue PTA levothyroxine     Chronic atrial fibrillation.  Rate is controlled.  -Warfarin, pharmacy to dose  -Continue metoprolol XL at reduced dose of 25 mg b.i.d with hold parameters (dose decreased as above for soft BP and bradycardia)  -Telemetry     History of recurrent DVT.  Patient with a history of DVT with recurrence following discontinuation of Coumadin  -Continue warfarin, pharmacy to dose     Chronic lower extremity lymphedema.  -Continue to hold PTA Bumex 4 mg bid due to soft blood pressures/ NYDIA  -Leg wraps  -Keeps legs elevated  -Lymphedema therapy consult    Dementia.  At baseline.    # Pain Assessment:   Current Pain Score 2/19/2018 2/19/2018 2/19/2018   Patient currently in pain? sleeping: patient not able to self report yes sleeping: patient not able to self report   Pain score (0-10) - 6 -   Pain location - Back -   Pain descriptors - - -   - Antoinette is unable to participate in a collaborative plan for pain management due to dementia.   - Please see the plan for pain management as documented above  - Discussed with daughter      Active Diet Order      Regular Diet Adult      DVT Prophylaxis: Warfarin  Code Status: DNR / DNI  Disposition: Expected discharge back to her memory care unit on 02/21.    D/W RN, care coordinator, and ortho.  D/W daughter at extent 02/20.    Interval History   Patient reports no pain at rest. SBP is around 100. Left leg wound is dressed. No new event overnight.    Data reviewed today: I reviewed all new labs and imaging over the last 24 hours.    Physical Exam   Temp: 97.3  F (36.3  C) Temp src: Oral BP: 98/58 Pulse: 63 Heart Rate: 56 Resp: 16 SpO2: 98 % O2 Device: Nasal cannula Oxygen Delivery: 1 LPM  There were  no vitals filed for this visit.  Vital Signs with Ranges  Temp:  [97.2  F (36.2  C)-97.6  F (36.4  C)] 97.3  F (36.3  C)  Pulse:  [51-63] 63  Heart Rate:  [52-56] 56  Resp:  [16] 16  BP: ()/(41-58) 98/58  SpO2:  [93 %-98 %] 98 %  I/O's Last 24 hours  I/O last 3 completed shifts:  In: 2864 [P.O.:1080; I.V.:1784]  Out: 575 [Urine:575]    Constitutional: Comfortable, no acute distress  HEENT: No conjunctival pallor.  Neurologic: Awake, alert, oriented x1. Hard of hearing. No focal weakness  Neck: Supple, no elevated JVP  Respiratory: Clear to auscultation on ant chest exam  Cardiovascular: Normal S1 and S2. Irregularly irregular rhythm, slow heart rate  GI: Abdomen soft, non tender, non distended  Extremities: No calf tenderness, 2-3+ B/L LE edema  Skin/integument: Left knee skin wound is dressed.    Medications   All medications were reviewed.    sodium chloride 75 mL/hr at 02/19/18 1840     Warfarin Therapy Reminder         warfarin  1 mg Oral ONCE at 18:00     cephaleXin  500 mg Oral TID     acetaminophen  1,000 mg Oral Q8H     levothyroxine (SYNTHROID/LEVOTHROID) tablet 50 mcg  50 mcg Oral Daily     sodium chloride (PF)  3 mL Intracatheter Q8H     lidocaine  2 patch Transdermal Q24H     lidocaine   Transdermal Q24h     lidocaine   Transdermal Q8H     metoprolol succinate  25 mg Oral BID        Data     Recent Labs  Lab 02/20/18  0550 02/19/18  2240 02/19/18  1838 02/19/18  0618  02/18/18  0610  02/16/18  2112   WBC  --   --   --   --   --  5.1  --  4.4   HGB 8.2*  --  8.4* 8.4*  < > 7.8*  < > 9.3*   MCV  --   --   --   --   --  123*  --  125*   PLT  --   --   --   --   --  221  --  262   INR 2.89*  --   --  2.88*  --  2.93*  < > 2.87*    138  --  137  --  137  < >  --    POTASSIUM 3.5  --   --  3.4  --  3.7  --   --    CHLORIDE 102  --   --  100  --  98  --   --    CO2 30  --   --  30  --  31  --   --    BUN 38*  --   --  44*  --  49*  --   --    CR 1.25* 1.32*  --  1.50*  --  1.91*  < >  --    ANIONGAP  7  --   --  7  --  8  --   --    KACEY 8.3*  --   --  8.3*  --  8.4*  --   --    *  --   --  126*  --  127*  --   --    < > = values in this interval not displayed.    No results found for this or any previous visit (from the past 24 hour(s)).

## 2018-02-20 NOTE — PLAN OF CARE
Problem: Patient Care Overview  Goal: Plan of Care/Patient Progress Review  Outcome: No Change  A/O x 1 with forgetfulness, hard of hearing, incontinent of urine, uses BSC with ceiling lift, c/o chronic back pain, tylenol helping, bilateral lower extremity lymph edema, lymph edema wraps ordered,wound on left shin dressing CDI, new dressing applied, rest of skin intact except bruising, possible back to memory care unit tomorrow.

## 2018-02-20 NOTE — PLAN OF CARE
Problem: Patient Care Overview  Goal: Plan of Care/Patient Progress Review  Outcome: Improving  Pt c/o back pain, unable to give numerical rating. Pt appears comfortable at rest. Legs elevated due to swelling. Left shin wound changed by ortho PA. Good appetite. Pt up with lift to chair, pt sat in chair for majority of the day per daughter request.

## 2018-02-20 NOTE — PROGRESS NOTES
[facility]  Patient is currently open to home care services with [facility].  The patient is currently receiving OT Lymphedema services (was discharged from skilled nursing on 2/15/18).  [facility]  and team have been notified of patient admission.  [facility] liaison will continue to follow patient during stay.  If appropriate provide orders to resume home care at time of discharge. Please note, patient would need new orders for RN or PT services as OT lymphedema specialist cannot perform resumption of care visit.

## 2018-02-21 ENCOUNTER — APPOINTMENT (OUTPATIENT)
Dept: PHYSICAL THERAPY | Facility: CLINIC | Age: 83
DRG: 605 | End: 2018-02-21
Payer: COMMERCIAL

## 2018-02-21 VITALS
SYSTOLIC BLOOD PRESSURE: 100 MMHG | RESPIRATION RATE: 18 BRPM | HEART RATE: 63 BPM | DIASTOLIC BLOOD PRESSURE: 52 MMHG | TEMPERATURE: 97.3 F | OXYGEN SATURATION: 97 %

## 2018-02-21 LAB
ANION GAP SERPL CALCULATED.3IONS-SCNC: 6 MMOL/L (ref 3–14)
BUN SERPL-MCNC: 32 MG/DL (ref 7–30)
CALCIUM SERPL-MCNC: 8.6 MG/DL (ref 8.5–10.1)
CHLORIDE SERPL-SCNC: 103 MMOL/L (ref 94–109)
CO2 SERPL-SCNC: 30 MMOL/L (ref 20–32)
CREAT SERPL-MCNC: 1.08 MG/DL (ref 0.52–1.04)
ERYTHROCYTE [DISTWIDTH] IN BLOOD BY AUTOMATED COUNT: 15.9 % (ref 10–15)
GFR SERPL CREATININE-BSD FRML MDRD: 48 ML/MIN/1.7M2
GLUCOSE SERPL-MCNC: 102 MG/DL (ref 70–99)
HCT VFR BLD AUTO: 24.9 % (ref 35–47)
HGB BLD-MCNC: 8 G/DL (ref 11.7–15.7)
INR PPP: 2.76 (ref 0.86–1.14)
MCH RBC QN AUTO: 40 PG (ref 26.5–33)
MCHC RBC AUTO-ENTMCNC: 32.1 G/DL (ref 31.5–36.5)
MCV RBC AUTO: 125 FL (ref 78–100)
PLATELET # BLD AUTO: 249 10E9/L (ref 150–450)
POTASSIUM SERPL-SCNC: 3.5 MMOL/L (ref 3.4–5.3)
RBC # BLD AUTO: 2 10E12/L (ref 3.8–5.2)
SODIUM SERPL-SCNC: 139 MMOL/L (ref 133–144)
WBC # BLD AUTO: 4.9 10E9/L (ref 4–11)

## 2018-02-21 PROCEDURE — 97110 THERAPEUTIC EXERCISES: CPT | Mod: GP | Performed by: PHYSICAL THERAPIST

## 2018-02-21 PROCEDURE — 25000132 ZZH RX MED GY IP 250 OP 250 PS 637: Performed by: INTERNAL MEDICINE

## 2018-02-21 PROCEDURE — 99239 HOSP IP/OBS DSCHRG MGMT >30: CPT | Performed by: INTERNAL MEDICINE

## 2018-02-21 PROCEDURE — 85610 PROTHROMBIN TIME: CPT | Performed by: INTERNAL MEDICINE

## 2018-02-21 PROCEDURE — 85027 COMPLETE CBC AUTOMATED: CPT | Performed by: INTERNAL MEDICINE

## 2018-02-21 PROCEDURE — 80048 BASIC METABOLIC PNL TOTAL CA: CPT | Performed by: INTERNAL MEDICINE

## 2018-02-21 PROCEDURE — 36415 COLL VENOUS BLD VENIPUNCTURE: CPT | Performed by: INTERNAL MEDICINE

## 2018-02-21 PROCEDURE — 40000193 ZZH STATISTIC PT WARD VISIT: Performed by: PHYSICAL THERAPIST

## 2018-02-21 PROCEDURE — 97530 THERAPEUTIC ACTIVITIES: CPT | Mod: GP | Performed by: PHYSICAL THERAPIST

## 2018-02-21 RX ORDER — CEPHALEXIN 250 MG/5ML
500 POWDER, FOR SUSPENSION ORAL 3 TIMES DAILY
Qty: 210 ML | Refills: 0 | Status: SHIPPED | OUTPATIENT
Start: 2018-02-21 | End: 2018-02-21

## 2018-02-21 RX ORDER — METOPROLOL SUCCINATE 25 MG/1
12.5 TABLET, EXTENDED RELEASE ORAL 2 TIMES DAILY
Qty: 30 TABLET | Refills: 3 | Status: SHIPPED | OUTPATIENT
Start: 2018-02-21 | End: 2018-02-21

## 2018-02-21 RX ORDER — METOPROLOL SUCCINATE 25 MG/1
12.5 TABLET, EXTENDED RELEASE ORAL 2 TIMES DAILY
Qty: 30 TABLET | Refills: 3 | Status: SHIPPED | OUTPATIENT
Start: 2018-02-21

## 2018-02-21 RX ORDER — OXYCODONE HYDROCHLORIDE 5 MG/1
5 TABLET ORAL EVERY 4 HOURS PRN
Qty: 15 TABLET | Refills: 0 | Status: SHIPPED | OUTPATIENT
Start: 2018-02-21 | End: 2018-02-21

## 2018-02-21 RX ORDER — WARFARIN SODIUM 1 MG/1
1 TABLET ORAL
Status: DISCONTINUED | OUTPATIENT
Start: 2018-02-21 | End: 2018-02-21 | Stop reason: HOSPADM

## 2018-02-21 RX ORDER — CEPHALEXIN 250 MG/5ML
500 POWDER, FOR SUSPENSION ORAL 3 TIMES DAILY
Qty: 210 ML | Refills: 0 | Status: SHIPPED | OUTPATIENT
Start: 2018-02-21

## 2018-02-21 RX ADMIN — SENNOSIDES AND DOCUSATE SODIUM 1 TABLET: 8.6; 5 TABLET ORAL at 10:22

## 2018-02-21 RX ADMIN — CEPHALEXIN 500 MG: 250 POWDER, FOR SUSPENSION ORAL at 10:22

## 2018-02-21 RX ADMIN — LIDOCAINE 2 PATCH: 560 PATCH PERCUTANEOUS; TOPICAL; TRANSDERMAL at 08:46

## 2018-02-21 RX ADMIN — Medication 12.5 MG: at 08:46

## 2018-02-21 RX ADMIN — LEVOTHYROXINE SODIUM 50 MCG: 50 TABLET ORAL at 08:46

## 2018-02-21 RX ADMIN — ACETAMINOPHEN 1000 MG: 500 TABLET, FILM COATED ORAL at 05:45

## 2018-02-21 NOTE — DISCHARGE INSTRUCTIONS
Patient care order DAILY     Comments: Gradient Compression Wraps   1. To be worn for 23/24 hour per day.   2. To be worn from base of the toes (toes should be exposed) to ~1/2 inch below knee crease.   3. The wraps should be applied in this order: A. Tube bandage from base of toes to just below the crease in the knee; B. More narrow brown roll from base of toes to ankle; C. Wider brown roll from ankle to just below knee crease. Brown bandages should be applied in spiral fashion without full stretch. Layers should be placed close together near the toes, and gradually widen the closer to the knee to produce desired compression.   4. RN should completely remove compression wraps for 1 hour each day for skin care, skin assessment, wound care if needed, and then re-don. Fully remove and contact the edema therapist if legs or feet become painful, change in sensation, SOB, change in skin color, or pt otherwise does not tolerate wrapping.   5. If wraps become loose please tighten and re-tape, or fully unroll and re-wrap.   6. When patient discharges, please send bandages and supplies with patient.   Cleanin. Please rinse all compression wrap bandages and tube bandages in the patient's sink every 2-3 days starting 18. Lay them flat to dry on a towel. DO NOT hang to dry as this stretches them out and they lose compression.   2. Remove if pain, numbness, change in skin color and/or become soiled/wet.   3. Please try to prevent areas of bunching up and rolling to decrease risk of tourniquet effect (cutting off circulation and lymph flow to limb).   4. DO NOT throw away         Wound care DAILY     Comments: Plan of care for wound located on left LE: daily   Carefully remove old dressings.  If they stick then wet prior to removal- don't tear them off   1. Clean both legs and feet with gentle soap and water, getting between the toes   2. Apply lotion from toes to knees, not between toes.   3. Clean wound on left  anterior/ lateral shin with MicroKlenz Spray, pat dry   4.  Paint the entire area, including and especially the wound and suture line, with No Sting Skin Prep- this will help protect the skin from moisture and drainage and help keep sutures together until wound heals more.   5. Cover wounds/ suture lines with Aquacel Ag- cut wide strips as needed.   6. Lightly mist the Aquacel Ag with MicroKlenz Spray in areas where there is no drainage.  If drainage then leave Aquacel dry to better absorb drainage.   7. Cover with ABD   8. Secure with Ana wrap, time and date   9.  Compression as ordered- making sure to time and date the tape when wraps applied   10.  Keep heels elevated at all times   - consider stopping Aquacel Ag dressings when no more drainage, can just cover sutures with an ABD so they don't snag on the wraps.

## 2018-02-21 NOTE — DISCHARGE SUMMARY
[facility]  Discharge Summary        Antoinette Cowan MRN# [id number]   YOB: 1928 Age: 89 year old     Date of Admission:  2/16/2018  Date of Discharge:  2/21/2018  Admitting Physician:  Jonny Devlin MD  Discharge Physician:             Davian Sims MD  Discharging Service:             Hospitalist     Primary Provider: Linda Yee  Primary Care Physician Phone Number: [phone]     Discharge Diagnoses:     Mechanical fall with left anterior/lateral shin laceration/wound  NYDIA on CKD 3, due to prerenal azotemia, baseline cr 1-1.2.  Sinus bradycardia     OTHER/ CHRONIC DIAGNOSES:  Dementia  Cervicalgia  Hypertension  Dyslipidemia  Chronic atrial fibrillation, on warfarin  History of recurrent DVT  Chronic lower extremity lymphedema  Hypothyroidism  History of breast cancer  Severe pulmonary hypertension  2-3+ TR  Essential thrombocytosis    Problem Oriented Hospital Course   Ms. Antoinette Cowan is a 89 year old delightful lady with PMHx significant for dementia, chronic atrial fibrillation, breast cancer, severe pulmonary HTN with 2-3+ TR, lower extremity lymphedema, DVT, DLP, HTN, CKD stage 3 (baseline cr 1-1.2), hypothyroidism, and essential thrombocytosis who presented with a witnessed fall at her memory care facility suffering a large left shin skin laceration. She was admitted for further management.    For a detailed history and physical exam, please refer to the dictated admission note by Dr. Jonny Devlin on 02/17/2018.      Mechanical fall with large left anterior/lateral shin skin laceration/wound.  Patient fell after sliding out of a chair at [facility] care facility. Aide was present at time of fall. Large left shin laceration which was irrigated and sutured in the emergency department. X-ray tibia/fibula as well as lumbar spine negative for acute fratures. Orthopedic surgery consulted and recommended WBAT. Wound care consult obtained and recommendations made  by [facility] RN for wound care. She was placed on empiric antibiotic therapy with Keflex for total of 10 days. Sutures are to be removed 2 weeks post-repair on March 1st.       Acute blood loss anemia, secondary to skin laceration.  Patient reportedly had significant blood loss from her skin laceration prior to getting to ED. Hgb 9.3 on admission. It further drifted dwon to 8, but not required blood transfusion.      NYDIA on CKD stage 3.  Baseline cr 1-1.2. Cr 1.03 upon admission, increased to 1.91 on 02/18. FeNa 0.3%. U/A unremarkable. Suspected pre-renal azotemia given low FENa. Improved with IVF. Her PTA Bumex was held. Cr at discharge was 1.08.        HTN,  Sinus bradycardia.  [PTA metoprolol XL 50 mg po bid and Bumex 4 mg po bid]  Her blood pressure was running soft and HR was in low 50s. PTA metoprolol was gradually decreased to 12.5 mg po bid and Bumex was held. It can be resumed as an outpatient for management of her lower extremity edema, after she is reevaluated by her PCP and if renal function and blood pressure remain stable.     Hypothyroidism.  Chronic and stable on PTA levothyroxine.      Chronic atrial fibrillation.  Rate remained controlled. She continued with chronic anticoagulation therapy with warfarin. PTA metoprolol XL was decreased to 12.5 mg po bid due to sinus bradycardia and soft BP, as outlined above.      History of recurrent DVT.  She continued with chronic anticoagulation therapy with warfarin      Chronic lower extremity lymphedema.  PTA Bumex 4 mg bid ws held due to soft blood pressures and NYDIA. Lower extremity wraps and elevation of legs were recommended. Lymphedema consult was also obtained.      Dementia.  Stable and at baseline.    Essential thrombocytosis:  Chronic and stable on PTA hydroxyurea.        Code Status:      DNR / DNI        Important Results:      Na 139, K 3.5, Ca 8.6, Cr 1.08, Glu 102, WBC 4.9, Hgb 8, PLT 249K, INR 2.76.        Pending Results:        Unresulted Labs  Ordered in the Past 30 Days of this Admission     No orders found from 12/18/2017 to 2/17/2018.              Discharge Instructions and Follow-Up:      Follow-up Appointments     Follow-up and recommended labs and tests        Follow up with PCP in one week with INR, CBC, and BMP.  Sutures are to be removed on March 1st.  Please continue to use lower extremity wraps and keep legs elevated.                        Discharge Disposition:      Discharged to home with home health services (PT/OT/Home RN)        Discharge Medications:        Current Discharge Medication List      START taking these medications    Details   cephalexin (KEFLEX) 250 MG/5ML suspension Take 10 mLs (500 mg) by mouth 3 times daily  Qty: 210 mL, Refills: 0    Associated Diagnoses: Laceration of left lower extremity, initial encounter         CONTINUE these medications which have CHANGED    Details   metoprolol succinate (TOPROL-XL) 25 MG 24 hr tablet Take 0.5 tablets (12.5 mg) by mouth 2 times daily  Qty: 30 tablet, Refills: 3    Associated Diagnoses: Essential hypertension         CONTINUE these medications which have NOT CHANGED    Details   saccharomyces boulardii (FLORASTOR) 250 MG capsule Take 250 mg by mouth daily      WARFARIN SODIUM PO Take 1 mg by mouth daily      ACETAMINOPHEN PO Take 1,000 mg by mouth every 6 hours as needed for pain or fever      senna-docusate (SENOKOT-S;PERICOLACE) 8.6-50 MG per tablet Take 1 tablet by mouth daily as needed for constipation      lidocaine (LIDODERM) 5 % Patch Apply up to 3 patches to painful area at once for up to 12 h within a 24 h period.  Remove after 12 hours.  Qty: 30 patch, Refills: 1    Associated Diagnoses: Closed fracture of multiple ribs of right side, initial encounter      LEVOTHYROXINE SODIUM PO Take 50 mcg by mouth daily      polyethylene glycol (MIRALAX/GLYCOLAX) Packet Take 1 packet by mouth every other day       SIMVASTATIN PO Take 20 mg by mouth At Bedtime      nystatin  (MYCOSTATIN) 114922 UNIT/GM POWD Apply topically 2 times daily Under breasts      hydroxyurea (HYDREA) 500 MG capsule Take 1,000 mg by mouth daily          STOP taking these medications       METOLAZONE PO Comments:   Reason for Stopping:         IBUPROFEN PO Comments:   Reason for Stopping:         oxyCODONE IR (ROXICODONE) 5 MG tablet Comments:   Reason for Stopping:         Potassium Chloride Magdalene ER (K-DUR PO) Comments:   Reason for Stopping:         Bumetanide (BUMEX PO) Comments:   Reason for Stopping:                   Allergies:         Allergies   Allergen Reactions     Clindamycin Rash     Ciprofloxacin Hives     Vicodin [Hydrocodone-Acetaminophen] Rash           Consultations This Hospital Stay:      Orthopedics  Wound care  Physical therapy/ Lymphedema care        Condition and Physical on Discharge:      Discharge condition: Stable   Vitals: Blood pressure 96/48, pulse 63, temperature 97.2  F (36.2  C), temperature source Axillary, resp. rate 18, SpO2 97 %.           Diet and Activity:     After Care Instructions     Activity       Your activity upon discharge: activity as tolerated            Diet       Follow this diet upon discharge: Orders Placed This Encounter      Regular Diet Adult                            Discharge Time:      Greater than 30 minutes.        Image Results From This Hospital Stay (For Non-EPIC Providers):        Results for orders placed or performed during the hospital encounter of 02/16/18   XR Tibia & Fibula Left 2 Views    Narrative    LEFT TIBIA-FIBULA TWO VIEWS 2/18/2018 12:52 PM     HISTORY: Left leg laceration, evaluate for fracture.    COMPARISON: None.      Impression    IMPRESSION: Osteopenia. Total knee arthroplasty. Arterial  calcifications. No evidence of fracture.    NYLA VASQUEZ MD   XR Lumbar Spine 2/3 Views    Narrative    LUMBAR SPINE TWO VIEWS  2/18/2018 12:53 PM     HISTORY: Back pain following fall.    COMPARISON: None.      Impression    IMPRESSION:   Previous vertebroplasties at T12 and L1. Scoliosis convex  to the left. No definite new fracture. Arterial calcifications. Vena  cava filter. Constipation.    NYLA VASQUEZ MD     Recent Results (from the past 4320 hour(s))   Echocardiogram Complete    Narrative    [id number]  [id number]  [id number]  [id number]^NATHANIEL^HANG^JAMEL           [facility]  Echocardiography Laboratory  [address] [place] [address]        Name: SIERRA CALABRESE  MRN: [id number]  : 1928  Study Date: 2017 09:15 AM  Age: 89 yrs  Gender: Female  Patient Location: [facility]  Reason For Study: Edema  Ordering Physician: HANG ARMSTRONG  Referring Physician: CEDRIC LANDEROS  Performed By: Olivia Garcia RDCS     BSA: 1.9 m2  Height: 64 in  Weight: 180 lb  HR: 60  BP: 115/55 mmHg  _____________________________________________________________________________  __        Procedure  Complete Echo Adult.  _____________________________________________________________________________  __        Interpretation Summary     Left ventricular systolic function is normal.  The visual ejection fraction is estimated at 60-65%.  Mildly decreased right ventricular systolic function  There is moderate to mod-severe (2-3+) tricuspid regurgitation.  Right ventricular systolic pressure is elevated, consistent with severe  pulmonary hypertension.  There is moderately severe (3+) pulmonic valvular regurgitation.  The study was technically difficult. There is no comparison study available.  _____________________________________________________________________________  __        Left Ventricle  The left ventricle is normal in size. There is normal left ventricular wall  thickness. Left ventricular systolic function is normal. The visual ejection  fraction is estimated at 60-65%. Left ventricular diastolic function is  indeterminate. Mid to basal inferior hypokinesis. Flattened septum consistent  with right ventricular volume overload.     Right  Ventricle  The right ventricle is borderline dilated. Mildly decreased right ventricular  systolic function.     Atria  There is severe biatrial enlargement. There is no color Doppler evidence of an  atrial shunt.     Mitral Valve  There is mild mitral annular calcification. There is mild to moderate (1-2+)  mitral regurgitation.        Tricuspid Valve  There is moderate to mod-severe (2-3+) tricuspid regurgitation. The right  ventricular systolic pressure is approximated at 84.3 mmHg plus the right  atrial pressure. Normal IVC (1.5-2.5cm) with >50% respiratory collapse; right  atrial pressure is estimated at 5-10mmHg. Right ventricular systolic pressure  is elevated, consistent with severe pulmonary hypertension.     Aortic Valve  The aortic valve is trileaflet. There is moderate trileaflet aortic sclerosis.  There is mild (1+) aortic regurgitation. No aortic stenosis is present.     Pulmonic Valve  There is moderately severe (3+) pulmonic valvular regurgitation.     Vessels  Borderline aortic root dilatation.     Pericardium  There is no pericardial effusion.        Rhythm  The rhythm was atrial fibrillation with wide QRS rhythm.  _____________________________________________________________________________  __  MMode/2D Measurements & Calculations  IVSd: 0.91 cm     LVIDd: 3.7 cm  LVIDs: 2.6 cm  LVPWd: 1.1 cm  FS: 30.8 %  EDV(Teich): 59.2 ml  ESV(Teich): 24.2 ml  LV mass(C)d: 118.3 grams  LV mass(C)dI: 63.2 grams/m2  Ao root diam: 3.7 cm  LA dimension: 5.3 cm  asc Aorta Diam: 3.3 cm  LA/Ao: 1.4  LVOT diam: 2.2 cm  LVOT area: 3.7 cm2  LA Volume (BP): 98.4 ml  LA Volume Index (BP): 52.6 ml/m2  RWT: 0.61           Doppler Measurements & Calculations  MV E max perry: 124.0 cm/sec  MV A max perry: 40.5 cm/sec  MV E/A: 3.1  MV dec time: 0.17 sec  AI P1/2t: 758.2 msec  PA acc time: 0.06 sec  PI end-d perry: 0.73 cm/sec  TR max perry: 459.1 cm/sec  TR max P.3 mmHg  E/E' av.8  Lateral E/e': 16.5  Medial E/e': 29.0               _____________________________________________________________________________  __        Report approved by: Aminta Perez 12/17/2017 11:09 AM              Most Recent Lab Results In EPIC (For Non-EPIC Providers):    Most Recent 3 CBC's:  Recent Labs   Lab Test  02/21/18   0650  02/20/18   0550  02/19/18   1838   02/18/18   0610   02/16/18   2112   WBC  4.9   --    --    --   5.1   --   4.4   HGB  8.0*  8.2*  8.4*   < >  7.8*   < >  9.3*   MCV  125*   --    --    --   123*   --   125*   PLT  249   --    --    --   221   --   262    < > = values in this interval not displayed.      Most Recent 3 BMP's:  Recent Labs   Lab Test  02/21/18   0650  02/20/18   0550  02/19/18   2240  02/19/18   0618   NA  139  139  138  137   POTASSIUM  3.5  3.5   --   3.4   CHLORIDE  103  102   --   100   CO2  30  30   --   30   BUN  32*  38*   --   44*   CR  1.08*  1.25*  1.32*  1.50*   ANIONGAP  6  7   --   7   KACEY  8.6  8.3*   --   8.3*   GLC  102*  103*   --   126*     Most Recent 3 Troponin's:No lab results found.    Invalid input(s): TROP, TROPONINIES  Most Recent 3 INR's:  Recent Labs   Lab Test  02/21/18   0650  02/20/18   0550  02/19/18   0618   INR  2.76*  2.89*  2.88*     Most Recent 2 LFT's:  Recent Labs   Lab Test  09/10/17   1450   AST  21   ALT  19   ALKPHOS  65   BILITOTAL  0.9     Most Recent Cholesterol Panel:No lab results found.  Most Recent 6 Bacteria Isolates From Any Culture (See EPIC Reports for Culture Details):No lab results found.  Most Recent TSH, T4 and HgbA1c:   Recent Labs   Lab Test  12/16/17   0910   TSH  14.32*   T4  1.35

## 2018-02-21 NOTE — PLAN OF CARE
Problem: Patient Care Overview  Goal: Plan of Care/Patient Progress Review  Outcome: No Change  VSS, alert to self and place at times. Up A2 w/ lift to BSC, occasionally incontinent. Lymphedema wraps on bilateral lower extremities; CDI. Lidoderm patch on back removed; pt c/o chronic low back pain. Pt plans d/c back to [facility] facility tomorrow. Will continue to monitor.

## 2018-02-21 NOTE — PROGRESS NOTES
Care Transition Initial Assessment - RN        Met with: Patient.  DATA   Active Problems:    Knee laceration    Open knee wound       Cognitive Status: awake.        Contact information and PCP information verified: Yes  Lives With: facility resident  Living Arrangements: assisted living                 Insurance concerns: No Insurance issues identified  ASSESSMENT  Patient currently receives the following services:  Home OT for Lymphedema. Recently discharged from Home RN        Identified issues/concerns regarding health management:  Will need continued HC services  PLAN  Financial costs for the patient include  .  Patient given options and choices for discharge yes    Patient/family is agreeable to the plan?  Yes:   Patient anticipates discharging to Memory Care Unit  (return). Daughter staying x 1 week        Patient anticipates needs for home equipment Continue Home O2.   Discharge Planner   Discharge Plans in progress: yes  Barriers to discharge plan: no  Plan/Disposition: Home w [facility]   Appointments: PCP appointment made for suture removal/labs     Care  (CTS) will continue to follow as needed.

## 2018-02-21 NOTE — PLAN OF CARE
Problem: Patient Care Overview  Goal: Plan of Care/Patient Progress Review  Outcome: Improving  Pt remains A/O to self. [place]. Up with A2 lift. Incontinent. Iv SL. O2 2L at baseline. Lymph wraps BLE. Dressing to LLE changed. Chronic back pain, managed with lidocaine and tylenol. Tele: afib CVR. Plan to d/c back to [facility] around 1330 with w/c transport. Continue to monitor. Daughter at bedside, teaching for dressing shown to daughter.

## 2018-02-21 NOTE — PROGRESS NOTES
D: Received and faxed discharge orders to [facility] assisted living. Per PT, patient is safe to discharge there with homecare. SW set up transport for 1330; informed patient's family it may be private pay. Patient's family are in agreement with this plan.

## 2018-02-21 NOTE — PROGRESS NOTES
[facility]  Hospitalist Progress Note  Davian Sims MD    Assessment & Plan   Antoinette Cowan is a 89 year old female with PMHx significant for dementia, chronic atrial fibrillation, breast cancer, severe pulmonary HTN with 2-3+ TR, lower extremity lymphedema, DVT, DLP, HTN, CKD stage 3 (baseline cr 1-1.2), hypothyroidism, and thrombocytosis who presented with a witnessed fall at her [facility] facility suffering a large left shin skin laceration. She was admitted for further management.     Mechanical fall with large left anterior/lateral shin skin laceration/wound.  Patient fell after sliding out of a chair at [facility]. Aide was present at time of fall. Large left shin laceration which was irrigated and sutured in the emergency department. X-ray tibia/fibula as well as lumbar spine negative for acute fratures.    -Orthopedic surgery consulted and recommended WBAT  -Cont wound care per [facility] RN recommendation  -Empiric antibiotic therapy with Keflex for total of 10 days.  -Acetaminophen prn  -Fall precautions  -Continue PT/OT assessment  -Need to remove sutures 2 weeks post repair    Acute blood loss anemia, secondary to skin laceration.    Recent Labs  Lab 02/21/18  0650 02/20/18  0550 02/19/18  1838 02/19/18  0618 02/18/18  1915 02/18/18  0610 02/17/18  0945 02/16/18  2112   HGB 8.0* 8.2* 8.4* 8.4* 8.7* 7.8* 8.8* 9.3*     -Continue to monitor Hgb periodically  -Transfusion prn for Hgb<7 (consent obtained from daughter)    NYDIA on CKD stage 3.  Baseline cr 1-1.2. Cr 1.03 upon admission, increased to 1.91 on 02/18. FeNa 0.3%. U/A unremarkable. Suspect pre-renal azotemia given low FENa.  Improved with IVF.    Recent Labs  Lab 02/21/18  0650 02/20/18  0550 02/19/18  2240 02/19/18  0618 02/18/18  0610   CR 1.08* 1.25* 1.32* 1.50* 1.91*       -Continue to hold PTA Bumex  -Continue to monitor renal function and electrolytes  -Continue to monitor I/O  -Avoid NSAIDs and other  nephrotoxins     Cervicalgia:   Present prior to trauma.  -Lidocaine patch     HTN,  Bradycardia.  BP is running soft and HR was around 50-60.  [PTA metoprolol XL 50 mg po bid and Bumex 4 mg po bid]  -Decreased PTA metoprolol gradually to 12.5 mg po bid   -Continue to hold PTA Bumex  -Continue to monitor BP    Hypothyroidism.  -Continue PTA levothyroxine     Chronic atrial fibrillation.  Rate is controlled.  -Warfarin, pharmacy to dose  -Continue metoprolol XL at reduced dose of 12.5 mg b.i.d with hold parameters (dose decreased as above for soft BP and bradycardia)  -Telemetry     History of recurrent DVT.  Patient with a history of DVT with recurrence following discontinuation of Coumadin  -Continue warfarin, pharmacy to dose     Chronic lower extremity lymphedema.  -Continue to hold PTA Bumex 4 mg bid due to soft blood pressures/ NYDIA  -Leg wraps  -Keeps legs elevated  -Lymphedema therapy     Dementia.  At baseline.    Essential thrombocytosis:  -Will resume PTA hydroxyurea at discharge.    # Pain Assessment:   Current Pain Score 2/21/2018 2/21/2018 2/20/2018   Patient currently in pain? yes sleeping: patient not able to self report denies   Pain score (0-10) - - -   Pain location Back - -   Pain descriptors - - -   - Antoinette is unable to participate in a collaborative plan for pain management due to dementia.   - Please see the plan for pain management as documented above  - Discussed with daughter      Active Diet Order      Regular Diet Adult      DVT Prophylaxis: Warfarin  Code Status: DNR / DNI  Disposition: Expected discharge back to her memory care unit today with home health care.    D/W RN, care coordinator, and ortho.  D/W daughter at extent 02/21.    Interval History   Patient reports no pain at rest. SBP is around 100. Left leg wound is dressed. No new event overnight.    Data reviewed today: I reviewed all new labs and imaging over the last 24 hours.    Physical Exam   Temp: 97.2  F (36.2  C) Temp src:  Axillary BP: 96/48   Heart Rate: 53 Resp: 18 SpO2: 97 % O2 Device: Nasal cannula Oxygen Delivery: 2 LPM  There were no vitals filed for this visit.  Vital Signs with Ranges  Temp:  [97.1  F (36.2  C)-97.4  F (36.3  C)] 97.2  F (36.2  C)  Heart Rate:  [50-54] 53  Resp:  [16-18] 18  BP: ()/(48-60) 96/48  SpO2:  [95 %-97 %] 97 %  I/O's Last 24 hours  I/O last 3 completed shifts:  In: -   Out: 400 [Urine:400]    Constitutional: Comfortable, no acute distress  HEENT: No conjunctival pallor.  Neurologic: Awake, alert, oriented x1-2. Hard of hearing. No focal weakness  Neck: Supple, no elevated JVP  Respiratory: Clear to auscultation on ant chest exam  Cardiovascular: Normal S1 and S2. Irregularly irregular rhythm, slow heart rate  GI: Abdomen soft, non tender, non distended  Extremities: No calf tenderness, 2+ B/L LE edema, wrap noted  Skin/integument: Left knee skin wound is dressed.    Medications   All medications were reviewed.    Warfarin Therapy Reminder         cephaleXin  500 mg Oral TID     acetaminophen  1,000 mg Oral Q8H     levothyroxine (SYNTHROID/LEVOTHROID) tablet 50 mcg  50 mcg Oral Daily     sodium chloride (PF)  3 mL Intracatheter Q8H     lidocaine  2 patch Transdermal Q24H     lidocaine   Transdermal Q24h     lidocaine   Transdermal Q8H     metoprolol succinate  25 mg Oral BID        Data     Recent Labs  Lab 02/21/18  0650 02/20/18  0550 02/19/18  2240 02/19/18  1838 02/19/18  0618  02/18/18  0610  02/16/18  2112   WBC 4.9  --   --   --   --   --  5.1  --  4.4   HGB 8.0* 8.2*  --  8.4* 8.4*  < > 7.8*  < > 9.3*   *  --   --   --   --   --  123*  --  125*     --   --   --   --   --  221  --  262   INR 2.76* 2.89*  --   --  2.88*  --  2.93*  < > 2.87*    139 138  --  137  --  137  < >  --    POTASSIUM 3.5 3.5  --   --  3.4  --  3.7  < >  --    CHLORIDE 103 102  --   --  100  --  98  < >  --    CO2 30 30  --   --  30  --  31  < >  --    BUN 32* 38*  --   --  44*  --  49*  < >  --     CR 1.08* 1.25* 1.32*  --  1.50*  --  1.91*  < >  --    ANIONGAP 6 7  --   --  7  --  8  < >  --    KACEY 8.6 8.3*  --   --  8.3*  --  8.4*  < >  --    * 103*  --   --  126*  --  127*  < >  --    < > = values in this interval not displayed.    No results found for this or any previous visit (from the past 24 hour(s)).

## 2018-02-21 NOTE — PLAN OF CARE
Problem: Patient Care Overview  Goal: Plan of Care/Patient Progress Review  Discharge Planner PT   Patient plan for discharge: return to [facility] with HHPT and resumption of HHOT for continued skilled lymph  Current status: Pt supine upon PT entry on 2L O2 via NC, agreeable to participate in therapy. Transferring supine>sit, sit>stand with pivot transfer to commode, sit>stand pivot transfer to chair, requiring ModAx1 and MinAx1 for all transfers. Pt benefits from posterior tc d/t posterior lean and single-step vc for sequencing d/t [place] with daughter present throughout session to assist in transfers, as she is very familiar with pt's baseline. Pt completes all mobility on RA w/o symptoms, demo good LE ex tolerance. Participates in seated LE exercise on 2L at end of session.  Discussed with daughter plans for transport to [facility] this afternoon and that w/c transport would be safest, daughter in agreement. Pt left seated in chair with BLE elevated and VSS.  Barriers to return to prior living situation: decreased activity tolerance, decreased ambulation tolerance, weakness; daughter reports Ax2 available at [facility]  Recommendations for discharge: back to [facility] with HHPT and resumed HHOT for skilled OT lymph cares  Rationale for recommendations: Pt does better in her own familiar apt, daughter is here from out of town and staying for a week so can help, [facility] can increase amount of help to 2 assist. Pt does need to be able to take a few steps, will work on that in PT. Pt would benefit from HHPT when returns to [facility] to progress strength, activity tolerance, and balance to return to PLOF.       Entered by: Amber Esquivel 02/21/2018 11:13 AM         Physical Therapy Discharge Summary    Reason for therapy discharge:    Discharged to home to her [facility]    Progress towards therapy goal(s). See goals on Care Plan in [facility] electronic health record for goal details.  Goals partially met.  Barriers to achieving goals:   discharge from  facility.    Therapy recommendation(s):    Continued therapy is recommended.  Rationale/Recommendations:   Pt would benefit from HHPT when returns to [facility] to progress strength, activity tolerance, and balance to return to PLOF..

## 2018-02-21 NOTE — PLAN OF CARE
Problem: Patient Care Overview  Goal: Plan of Care/Patient Progress Review  Outcome: Improving  [id number]: Pt oriented to self. Vital signs stable. New dressing change, ace wraps on before D/C. Pt to BSC. D/C instructions and medications was given to daughter. Questions and concerns  answered . Pt d/c by wheelchair with daughter

## 2018-02-21 NOTE — PLAN OF CARE
Problem: Patient Care Overview  Goal: Plan of Care/Patient Progress Review  Outcome: No Change  Patient alert to self only. VSS on 2 L NC. Complains of back pain, lidocaine patch off. Incontinent. Saline locked. Tolerating regular diet. Up with lift. Lymph wraps in place. Tele: DEBORAH Jernigan CVR/misael. Plan to go back to memory care today.

## 2018-02-27 ENCOUNTER — RECORDS - HEALTHEAST (OUTPATIENT)
Dept: LAB | Facility: CLINIC | Age: 83
End: 2018-02-27

## 2018-02-27 LAB — INR PPP: 2.15 (ref 0.9–1.1)

## 2018-03-13 ENCOUNTER — RECORDS - HEALTHEAST (OUTPATIENT)
Dept: LAB | Facility: CLINIC | Age: 83
End: 2018-03-13

## 2018-03-13 LAB — INR PPP: 4.39 (ref 0.9–1.1)

## 2018-03-19 ENCOUNTER — RECORDS - HEALTHEAST (OUTPATIENT)
Dept: LAB | Facility: CLINIC | Age: 83
End: 2018-03-19

## 2018-03-20 LAB — INR PPP: 3.29 (ref 0.9–1.1)

## 2018-04-02 ENCOUNTER — RECORDS - HEALTHEAST (OUTPATIENT)
Dept: LAB | Facility: CLINIC | Age: 83
End: 2018-04-02

## 2018-04-03 ENCOUNTER — RECORDS - HEALTHEAST (OUTPATIENT)
Dept: LAB | Facility: CLINIC | Age: 83
End: 2018-04-03

## 2018-04-03 LAB
ALBUMIN UR-MCNC: ABNORMAL MG/DL
APPEARANCE UR: ABNORMAL
BACTERIA #/AREA URNS HPF: ABNORMAL HPF
BILIRUB UR QL STRIP: NEGATIVE
COLOR UR AUTO: YELLOW
GLUCOSE UR STRIP-MCNC: NEGATIVE MG/DL
HGB UR QL STRIP: ABNORMAL
INR PPP: 2.9 (ref 0.9–1.1)
KETONES UR STRIP-MCNC: NEGATIVE MG/DL
LEUKOCYTE ESTERASE UR QL STRIP: ABNORMAL
NITRATE UR QL: NEGATIVE
PH UR STRIP: 6 [PH] (ref 4.5–8)
RBC #/AREA URNS AUTO: ABNORMAL HPF
SP GR UR STRIP: 1.02 (ref 1–1.03)
SQUAMOUS #/AREA URNS AUTO: >100 LPF
TRANS CELLS #/AREA URNS HPF: ABNORMAL LPF
UROBILINOGEN UR STRIP-ACNC: ABNORMAL
WBC #/AREA URNS AUTO: ABNORMAL HPF

## 2018-04-06 LAB
BACTERIA SPEC CULT: ABNORMAL
BACTERIA SPEC CULT: ABNORMAL

## 2018-04-23 ENCOUNTER — RECORDS - HEALTHEAST (OUTPATIENT)
Dept: LAB | Facility: CLINIC | Age: 83
End: 2018-04-23

## 2018-04-24 LAB — INR PPP: 2.82 (ref 0.9–1.1)

## 2018-05-15 ENCOUNTER — RECORDS - HEALTHEAST (OUTPATIENT)
Dept: LAB | Facility: CLINIC | Age: 83
End: 2018-05-15

## 2018-05-15 LAB — INR PPP: 9.84 (ref 0.9–1.1)

## 2018-05-16 ENCOUNTER — RECORDS - HEALTHEAST (OUTPATIENT)
Dept: LAB | Facility: CLINIC | Age: 83
End: 2018-05-16

## 2018-05-16 LAB — INR PPP: 10.46 (ref 0.9–1.1)

## 2018-05-17 ENCOUNTER — RECORDS - HEALTHEAST (OUTPATIENT)
Dept: LAB | Facility: CLINIC | Age: 83
End: 2018-05-17

## 2018-05-17 LAB — INR PPP: 5.18 (ref 0.9–1.1)

## 2018-05-18 ENCOUNTER — RECORDS - HEALTHEAST (OUTPATIENT)
Dept: LAB | Facility: CLINIC | Age: 83
End: 2018-05-18

## 2018-05-18 LAB — INR PPP: 3.87 (ref 0.9–1.1)

## 2018-05-21 ENCOUNTER — RECORDS - HEALTHEAST (OUTPATIENT)
Dept: LAB | Facility: CLINIC | Age: 83
End: 2018-05-21

## 2018-05-21 LAB — INR PPP: 2.85 (ref 0.9–1.1)

## 2018-05-25 ENCOUNTER — RECORDS - HEALTHEAST (OUTPATIENT)
Dept: LAB | Facility: CLINIC | Age: 83
End: 2018-05-25

## 2018-05-29 LAB — INR PPP: 9.17 (ref 0.9–1.1)

## 2018-05-31 ENCOUNTER — RECORDS - HEALTHEAST (OUTPATIENT)
Dept: LAB | Facility: CLINIC | Age: 83
End: 2018-05-31

## 2018-06-01 LAB — INR PPP: 13.44 (ref 0.9–1.1)

## 2018-06-02 ENCOUNTER — RECORDS - HEALTHEAST (OUTPATIENT)
Dept: LAB | Facility: CLINIC | Age: 83
End: 2018-06-02

## 2018-06-02 LAB — INR PPP: 11.03 (ref 0.9–1.1)

## 2018-06-03 ENCOUNTER — RECORDS - HEALTHEAST (OUTPATIENT)
Dept: LAB | Facility: CLINIC | Age: 83
End: 2018-06-03

## 2018-06-03 LAB — INR PPP: 5.37 (ref 0.9–1.1)

## 2018-11-20 ENCOUNTER — COMMUNICATION - HEALTHEAST (OUTPATIENT)
Dept: SCHEDULING | Facility: CLINIC | Age: 83
End: 2018-11-20

## 2022-02-10 NOTE — PLAN OF CARE
Problem: Pain, Acute (Adult)  Goal: Identify Related Risk Factors and Signs and Symptoms  Related risk factors and signs and symptoms are identified upon initiation of [employer] Clinical Practice Guideline (CPG).   Outcome: Adequate for Discharge Date Met: 12/18/17  Pt alert to self only. VSS, O2 2L. Up 2 assist w/ walker. Reports pain 2/10 using oxycodone with relief. Regular diet tolerated. CMS intact except 3+ edema bilateral LE. Voiding adequately. D/c back to assist living with daughter at 1400 via wheelchair and portable O2. AVS and cost of medication reviewed with pt at d/c. Denies pain at d/c.        Calcipotriene Pregnancy And Lactation Text: This medication has not been proven safe during pregnancy. It is unknown if this medication is excreted in breast milk.